# Patient Record
Sex: FEMALE | Race: WHITE | NOT HISPANIC OR LATINO | Employment: FULL TIME | URBAN - METROPOLITAN AREA
[De-identification: names, ages, dates, MRNs, and addresses within clinical notes are randomized per-mention and may not be internally consistent; named-entity substitution may affect disease eponyms.]

---

## 2017-04-24 ENCOUNTER — ALLSCRIPTS OFFICE VISIT (OUTPATIENT)
Dept: OTHER | Facility: OTHER | Age: 32
End: 2017-04-24

## 2017-06-01 ENCOUNTER — OFFICE VISIT (OUTPATIENT)
Dept: URGENT CARE | Facility: CLINIC | Age: 32
End: 2017-06-01
Payer: COMMERCIAL

## 2017-06-01 PROCEDURE — 99214 OFFICE O/P EST MOD 30 MIN: CPT

## 2017-08-29 ENCOUNTER — TRANSCRIBE ORDERS (OUTPATIENT)
Dept: ADMINISTRATIVE | Facility: HOSPITAL | Age: 32
End: 2017-08-29

## 2017-08-29 DIAGNOSIS — M79.604 RIGHT LEG PAIN: Primary | ICD-10-CM

## 2017-08-29 DIAGNOSIS — O22.02: ICD-10-CM

## 2017-08-30 ENCOUNTER — HOSPITAL ENCOUNTER (OUTPATIENT)
Dept: NON INVASIVE DIAGNOSTICS | Facility: HOSPITAL | Age: 32
Discharge: HOME | End: 2017-08-30
Attending: OBSTETRICS & GYNECOLOGY
Payer: COMMERCIAL

## 2017-08-30 DIAGNOSIS — M79.604 RIGHT LEG PAIN: ICD-10-CM

## 2017-08-30 DIAGNOSIS — O22.02: ICD-10-CM

## 2017-08-30 PROCEDURE — 93971 EXTREMITY STUDY: CPT

## 2017-12-05 ENCOUNTER — LAB REQUISITION (OUTPATIENT)
Dept: LAB | Facility: HOSPITAL | Age: 32
End: 2017-12-05
Payer: COMMERCIAL

## 2017-12-05 DIAGNOSIS — O09.93 SUPERVISION OF HIGH RISK PREGNANCY IN THIRD TRIMESTER: ICD-10-CM

## 2017-12-05 PROCEDURE — 87653 STREP B DNA AMP PROBE: CPT | Performed by: OBSTETRICS & GYNECOLOGY

## 2017-12-07 LAB — GP B STREP DNA SPEC QL NAA+PROBE: NORMAL

## 2017-12-31 ENCOUNTER — HOSPITAL ENCOUNTER (INPATIENT)
Facility: HOSPITAL | Age: 32
LOS: 2 days | Discharge: HOME | End: 2018-01-02
Attending: OBSTETRICS & GYNECOLOGY | Admitting: OBSTETRICS & GYNECOLOGY
Payer: COMMERCIAL

## 2017-12-31 DIAGNOSIS — Z3A.40 40 WEEKS GESTATION OF PREGNANCY: Primary | ICD-10-CM

## 2017-12-31 PROBLEM — J01.10 ACUTE FRONTAL SINUSITIS: Status: ACTIVE | Noted: 2017-06-01

## 2017-12-31 LAB
ERYTHROCYTE [DISTWIDTH] IN BLOOD BY AUTOMATED COUNT: 14.3 % (ref 11.6–15.1)
HCT VFR BLD AUTO: 37.7 % (ref 34.8–46.1)
HGB BLD-MCNC: 13.1 G/DL (ref 11.5–15.4)
MCH RBC QN AUTO: 29.7 PG (ref 26.8–34.3)
MCHC RBC AUTO-ENTMCNC: 34.7 G/DL (ref 31.4–37.4)
MCV RBC AUTO: 86 FL (ref 82–98)
PLATELET # BLD AUTO: 186 THOUSANDS/UL (ref 149–390)
PMV BLD AUTO: 10.7 FL (ref 8.9–12.7)
RBC # BLD AUTO: 4.41 MILLION/UL (ref 3.81–5.12)
WBC # BLD AUTO: 10.09 THOUSAND/UL (ref 4.31–10.16)

## 2017-12-31 PROCEDURE — 86850 RBC ANTIBODY SCREEN: CPT | Performed by: OBSTETRICS & GYNECOLOGY

## 2017-12-31 PROCEDURE — 86900 BLOOD TYPING SEROLOGIC ABO: CPT | Performed by: OBSTETRICS & GYNECOLOGY

## 2017-12-31 PROCEDURE — 86901 BLOOD TYPING SEROLOGIC RH(D): CPT | Performed by: OBSTETRICS & GYNECOLOGY

## 2017-12-31 PROCEDURE — 85027 COMPLETE CBC AUTOMATED: CPT | Performed by: OBSTETRICS & GYNECOLOGY

## 2017-12-31 PROCEDURE — 86592 SYPHILIS TEST NON-TREP QUAL: CPT | Performed by: OBSTETRICS & GYNECOLOGY

## 2017-12-31 RX ORDER — ONDANSETRON 2 MG/ML
4 INJECTION INTRAMUSCULAR; INTRAVENOUS EVERY 6 HOURS PRN
Status: DISCONTINUED | OUTPATIENT
Start: 2017-12-31 | End: 2018-01-01

## 2017-12-31 RX ORDER — LEVOTHYROXINE SODIUM 0.2 MG/1
200 TABLET ORAL
Status: DISCONTINUED | OUTPATIENT
Start: 2018-01-01 | End: 2018-01-02 | Stop reason: HOSPADM

## 2017-12-31 RX ORDER — SODIUM CHLORIDE, SODIUM LACTATE, POTASSIUM CHLORIDE, CALCIUM CHLORIDE 600; 310; 30; 20 MG/100ML; MG/100ML; MG/100ML; MG/100ML
125 INJECTION, SOLUTION INTRAVENOUS CONTINUOUS
Status: DISCONTINUED | OUTPATIENT
Start: 2018-01-01 | End: 2018-01-01

## 2017-12-31 RX ADMIN — SODIUM CHLORIDE, SODIUM LACTATE, POTASSIUM CHLORIDE, AND CALCIUM CHLORIDE 999 ML/HR: .6; .31; .03; .02 INJECTION, SOLUTION INTRAVENOUS at 23:58

## 2018-01-01 ENCOUNTER — ANESTHESIA EVENT (INPATIENT)
Dept: LABOR AND DELIVERY | Facility: HOSPITAL | Age: 33
End: 2018-01-01
Payer: COMMERCIAL

## 2018-01-01 ENCOUNTER — ANESTHESIA (INPATIENT)
Dept: LABOR AND DELIVERY | Facility: HOSPITAL | Age: 33
End: 2018-01-01
Payer: COMMERCIAL

## 2018-01-01 LAB
ABO GROUP BLD: NORMAL
BASE EXCESS BLDCOV CALC-SCNC: -3.1 MMOL/L (ref 1–9)
BLD GP AB SCN SERPL QL: NEGATIVE
HCO3 BLDCOV-SCNC: 19.8 MMOL/L (ref 12.2–28.6)
OXYHGB MFR BLDCOV: 70.5 %
PCO2 BLDCOV: 29.9 MM HG (ref 27–43)
PH BLDCOV: 7.44 [PH] (ref 7.19–7.49)
PO2 BLDCOV: 26.6 MM HG (ref 15–45)
RH BLD: POSITIVE
RPR SER QL: NORMAL
SAO2 % BLDCOV: 15.3 ML/DL
SPECIMEN EXPIRATION DATE: NORMAL

## 2018-01-01 PROCEDURE — 10907ZC DRAINAGE OF AMNIOTIC FLUID, THERAPEUTIC FROM PRODUCTS OF CONCEPTION, VIA NATURAL OR ARTIFICIAL OPENING: ICD-10-PCS | Performed by: OBSTETRICS & GYNECOLOGY

## 2018-01-01 PROCEDURE — 0KQM0ZZ REPAIR PERINEUM MUSCLE, OPEN APPROACH: ICD-10-PCS | Performed by: OBSTETRICS & GYNECOLOGY

## 2018-01-01 PROCEDURE — 99203 OFFICE O/P NEW LOW 30 MIN: CPT

## 2018-01-01 PROCEDURE — G0463 HOSPITAL OUTPT CLINIC VISIT: HCPCS

## 2018-01-01 PROCEDURE — 82805 BLOOD GASES W/O2 SATURATION: CPT | Performed by: OBSTETRICS & GYNECOLOGY

## 2018-01-01 RX ORDER — OXYTOCIN/RINGER'S LACTATE 30/500 ML
1-30 PLASTIC BAG, INJECTION (ML) INTRAVENOUS
Status: DISCONTINUED | OUTPATIENT
Start: 2018-01-01 | End: 2018-01-02 | Stop reason: HOSPADM

## 2018-01-01 RX ORDER — DOCUSATE SODIUM 100 MG/1
100 CAPSULE, LIQUID FILLED ORAL 2 TIMES DAILY
Status: DISCONTINUED | OUTPATIENT
Start: 2018-01-01 | End: 2018-01-02 | Stop reason: HOSPADM

## 2018-01-01 RX ORDER — CALCIUM CARBONATE 200(500)MG
1000 TABLET,CHEWABLE ORAL DAILY PRN
Status: DISCONTINUED | OUTPATIENT
Start: 2018-01-01 | End: 2018-01-02 | Stop reason: HOSPADM

## 2018-01-01 RX ORDER — ACETAMINOPHEN 325 MG/1
650 TABLET ORAL EVERY 6 HOURS PRN
Status: DISCONTINUED | OUTPATIENT
Start: 2018-01-01 | End: 2018-01-02 | Stop reason: HOSPADM

## 2018-01-01 RX ORDER — DIAPER,BRIEF,INFANT-TODD,DISP
1 EACH MISCELLANEOUS AS NEEDED
Status: DISCONTINUED | OUTPATIENT
Start: 2018-01-01 | End: 2018-01-02 | Stop reason: HOSPADM

## 2018-01-01 RX ORDER — OXYCODONE HYDROCHLORIDE AND ACETAMINOPHEN 5; 325 MG/1; MG/1
1 TABLET ORAL EVERY 4 HOURS PRN
Status: DISCONTINUED | OUTPATIENT
Start: 2018-01-01 | End: 2018-01-02 | Stop reason: HOSPADM

## 2018-01-01 RX ORDER — IBUPROFEN 600 MG/1
600 TABLET ORAL EVERY 6 HOURS PRN
Status: DISCONTINUED | OUTPATIENT
Start: 2018-01-01 | End: 2018-01-02 | Stop reason: HOSPADM

## 2018-01-01 RX ORDER — DIPHENHYDRAMINE HCL 25 MG
25 TABLET ORAL EVERY 6 HOURS PRN
Status: DISCONTINUED | OUTPATIENT
Start: 2018-01-01 | End: 2018-01-02 | Stop reason: HOSPADM

## 2018-01-01 RX ORDER — OXYCODONE HYDROCHLORIDE AND ACETAMINOPHEN 5; 325 MG/1; MG/1
2 TABLET ORAL EVERY 4 HOURS PRN
Status: DISCONTINUED | OUTPATIENT
Start: 2018-01-01 | End: 2018-01-02 | Stop reason: HOSPADM

## 2018-01-01 RX ORDER — DIPHENHYDRAMINE HYDROCHLORIDE 50 MG/ML
25 INJECTION INTRAMUSCULAR; INTRAVENOUS EVERY 6 HOURS PRN
Status: DISCONTINUED | OUTPATIENT
Start: 2018-01-01 | End: 2018-01-01

## 2018-01-01 RX ORDER — ONDANSETRON 2 MG/ML
4 INJECTION INTRAMUSCULAR; INTRAVENOUS EVERY 4 HOURS PRN
Status: DISCONTINUED | OUTPATIENT
Start: 2018-01-01 | End: 2018-01-01

## 2018-01-01 RX ORDER — ONDANSETRON 2 MG/ML
4 INJECTION INTRAMUSCULAR; INTRAVENOUS EVERY 8 HOURS PRN
Status: DISCONTINUED | OUTPATIENT
Start: 2018-01-01 | End: 2018-01-02 | Stop reason: HOSPADM

## 2018-01-01 RX ORDER — OXYTOCIN/RINGER'S LACTATE 30/500 ML
PLASTIC BAG, INJECTION (ML) INTRAVENOUS
Status: DISCONTINUED
Start: 2018-01-01 | End: 2018-01-01 | Stop reason: WASHOUT

## 2018-01-01 RX ADMIN — DOCUSATE SODIUM 100 MG: 100 CAPSULE, LIQUID FILLED ORAL at 20:25

## 2018-01-01 RX ADMIN — ROPIVACAINE HYDROCHLORIDE: 2 INJECTION, SOLUTION EPIDURAL; INFILTRATION at 00:36

## 2018-01-01 RX ADMIN — WITCH HAZEL 1 PAD: 500 SOLUTION RECTAL; TOPICAL at 03:26

## 2018-01-01 RX ADMIN — BENZOCAINE AND MENTHOL: 20; .5 SPRAY TOPICAL at 03:26

## 2018-01-01 RX ADMIN — IBUPROFEN 600 MG: 600 TABLET, FILM COATED ORAL at 15:57

## 2018-01-01 RX ADMIN — IBUPROFEN 600 MG: 600 TABLET, FILM COATED ORAL at 08:42

## 2018-01-01 RX ADMIN — LEVOTHYROXINE SODIUM 200 MCG: 200 TABLET ORAL at 05:44

## 2018-01-01 RX ADMIN — SODIUM CHLORIDE, SODIUM LACTATE, POTASSIUM CHLORIDE, AND CALCIUM CHLORIDE 125 ML/HR: .6; .31; .03; .02 INJECTION, SOLUTION INTRAVENOUS at 01:01

## 2018-01-01 RX ADMIN — DOCUSATE SODIUM 100 MG: 100 CAPSULE, LIQUID FILLED ORAL at 08:41

## 2018-01-01 NOTE — PLAN OF CARE
Knowledge Deficit     Verbalizes understanding of labor plan Completed        Labor & Delivery     Manages discomfort Completed     Patient vital signs are stable Completed          POSTPARTUM     Experiences normal postpartum course Progressing     Appropriate maternal -  bonding Progressing     Establishment of infant feeding pattern Progressing     Incision(s), wounds(s) or drain site(s) healing without S/S of infection Progressing

## 2018-01-01 NOTE — ANESTHESIA PROCEDURE NOTES
Epidural Block    Patient location during procedure: OB  Start time: 1/1/2018 12:23 AM  Reason for block: procedure for pain, at surgeon's request, post-op pain management and primary anesthetic  Staffing  Anesthesiologist: Roni Kennedy  Performed: anesthesiologist   Preanesthetic Checklist  Completed: patient identified, site marked, surgical consent, pre-op evaluation, timeout performed, IV checked, risks and benefits discussed and monitors and equipment checked  Epidural  Patient position: sitting  Prep: ChloraPrep  Patient monitoring: cardiac monitor and frequent blood pressure checks  Approach: midline  Location: lumbar (1-5)  Injection technique: [place] air  Needle  Needle type: Tuohy   Needle gauge: 18 G  Catheter type: side hole  Catheter size: 20 G  Test dose: negative and lidocaine 1 5% with epinephrine 1-to-200,000  Assessment  Sensory level: [id number] aspiration for CSF, negative aspiration for heme and no paresthesia on injection  patient tolerated the procedure well with no immediate complications

## 2018-01-01 NOTE — H&P
History & Physical - OB/GYN   Maria Ines Painter 27 y o  female MRN: [id number]  Unit/Bed#: L&D 323-01 Encounter: [id number]    27 y o   at 40w1d by first trimester ultrasound  She is a patient of [facility]    Chief complaint/HPI:  Contractions, occurring every 3 minutes since   Reports vaginal discharge and minimal vaginal bleeding  Endorses adequate fetal movement  Pregnancy Complications:  Patient Active Problem List   Diagnosis    40 weeks gestation of pregnancy    Acute frontal sinusitis    Hypothyroidism       PMH:  Past Medical History:   Diagnosis Date    Abnormal Pap smear of cervix     Anxiety     Disease of thyroid gland     Female infertility     IUI pregnancy    Varicella     Varicose vein of leg        PSH:  Past Surgical History:   Procedure Laterality Date    BUNIONECTOMY Left     COLPOSCOPY      WISDOM TOOTH EXTRACTION         Social Hx:  Denies x 3 in pregnancy    OB Hx:  Obstetric History       T1      L1     SAB0   TAB0   Ectopic0   Multiple0   Live Births1       # Outcome Date GA Lbr Lucio/2nd Weight Sex Delivery Anes PTL Lv   2 Current            1 Term 12/07/15 40w6d  3714 g (8 lb 3 oz) M Vag-Spont EPI N MARY          Meds:  No current facility-administered medications on file prior to encounter  No current outpatient prescriptions on file prior to encounter  Allergies:  No Known Allergies    Labs:  Blood type: AB+  Antibody: negative  Group B strep: negative  HIV: negative  Hepatitis B: negative  RPR: NR  Rubella: Immune  Varicella Immune  1 hour Glucose: 119    Physical Exam:  Ht 5' 8" (1 727 m)   Wt 97 5 kg (215 lb)   LMP 2017 (Approximate)   Breastfeeding? Yes   BMI 32 69 kg/m²     Physical Exam   Constitutional: She is oriented to person, place, and time  She appears well-developed and well-nourished  Cardiovascular: Normal rate, regular rhythm and normal heart sounds      Pulmonary/Chest: Effort normal and breath sounds normal    Abdominal: Soft  Bowel sounds are normal  She exhibits distension  Gravid   Neurological: She is alert and oriented to person, place, and time  Estimated Fetal Weight: 7 lbs  Presentation: vertex    SVE: 5 / 80% / -1  FHT:  120 / Moderate 6 - 25 bpm / + accelerations, no decelerations  Rosser: q2-3 minutes    Membranes: intact    Assessment:   27 y o   at 40w1d in labor    Plan:   1  Admit to L&D  2  CBC, RPR, type and screen  3   FEN: clear liquid diet, LR IVF    Epidural upon request    Discussed with Dr Errol John DO

## 2018-01-01 NOTE — ANESTHESIA POSTPROCEDURE EVALUATION
Post-Op Assessment Note      CV Status:  Stable    Mental Status:  Alert and awake    Hydration Status:  Euvolemic    PONV Controlled:  Controlled    Airway Patency:  Patent    Post Op Vitals Reviewed: Yes          Staff: Anesthesiologist     Post-op block assessment: catheter intact, site cleaned and no complications        BP      Temp      Pulse     Resp      SpO2

## 2018-01-01 NOTE — DISCHARGE INSTRUCTIONS
Vaginal Delivery   WHAT YOU SHOULD KNOW:   A vaginal delivery is the birth of your baby through your vagina (birth canal)  AFTER YOU LEAVE:   Medicines:  · NSAIDs  help decrease swelling and pain or fever  This medicine is available with or without a doctor's order  NSAIDs can cause stomach bleeding or kidney problems in certain people  If you take blood thinner medicine, always ask your healthcare provider if NSAIDs are safe for you  Always read the medicine label and follow directions  · Take your medicine as directed  Call your healthcare provider if you think your medicine is not helping or if you have side effects  Tell him if you are allergic to any medicine  Keep a list of the medicines, vitamins, and herbs you take  Include the amounts, and when and why you take them  Bring the list or the pill bottles to follow-up visits  Carry your medicine list with you in case of an emergency  Follow up with your primary healthcare provider:  Most women need to return 6 weeks after a vaginal delivery  Ask about how to care for your wounds or stitches  Write down your questions so you remember to ask them during your visits  Activity:  Rest as much as possible  Try to keep all activities short  You may be able to do some exercise soon after you have your baby  Talk with your primary healthcare provider before you start exercising  If you work outside the home, ask when you can return to your job  Kegel exercises:  Kegel exercises may help your vaginal and rectal muscles heal faster  You can do Kegel exercises by tightening and relaxing the muscles around your vagina  Kegel exercises help make the muscles stronger  Breast care:  When your milk comes in, your breasts may feel full and hard  Ask how to care for your breasts, even if you are not breastfeeding  Constipation:  Do not try to push the bowel movement out if it is too hard   High-fiber foods, extra liquids, and regular exercise can help you prevent constipation  Examples of high-fiber foods are fruit and bran  Prune juice and water are good liquids to drink  Regular exercise helps your digestive system work  You may also be told to take over-the-counter fiber and stool softener medicines  Take these items as directed  Hemorrhoids:  Pregnancy can cause severe hemorrhoids  You may have rectal pain because of the hemorrhoids  Ask how to prevent or treat hemorrhoids  Perineum care: Your perineum is the area between your vagina and anus  Keep the area clean and dry to help it heal and to prevent infection  Wash the area gently with soap and water when you bathe or shower  Rinse your perineum with warm water when you use the toilet  Your primary healthcare provider may suggest you use a warm sitz bath to help decrease pain  A sitz bath is a bathtub or basin filled to hip level  Stay in the sitz bath for 20 to 30 minutes, or as directed  Vaginal discharge: You will have vaginal discharge, called lochia, after your delivery  The lochia is bright red the first day or two after the birth  By the fourth day, the amount decreases, and it turns red-brown  Use a sanitary pad rather than a tampon to prevent a vaginal infection  It is normal to have lochia up to 8 weeks after your baby is born  Monthly periods: Your period may start again within 7 to 12 weeks after your baby is born  If you are breastfeeding, it may take longer for your period to start again  You can still get pregnant again even though you do not have your monthly period  Talk with your primary healthcare provider about a birth control method that will be good for you if you do not want to get pregnant  Mood changes: Many new mothers have some kind of mood changes after delivery  Some of these changes occur because of lack of sleep, hormone changes, and caring for a new baby  Some mood changes can be more serious, such as postpartum depression   Talk with your primary healthcare provider if you feel unable to care for yourself or your baby  Sexual activity:  You may need to avoid sex for 6 to 7 weeks after you have your baby  You may notice you have a decreased desire for sex, or sex may be painful  You may need to use a vaginal lubricant (gel) to help make sex more comfortable  Contact your primary healthcare provider if:   · You have heavy vaginal bleeding that fills 1 or more sanitary pads in 1 hour  · You have a fever  · Your pain does not go away, or gets worse  · The skin between your vagina and rectum is swollen, warm, or red  · You have swollen, hard, or painful breasts  · You feel very sad or depressed  · You feel more tired than usual      · You have questions or concerns about your condition or care  Seek care immediately or call 911 if:   · You have pus or yellow drainage coming from your vagina or wound  · You are urinating very little, or not at all  · Your arm or leg feels warm, tender, and painful  It may look swollen and red  · You feel lightheaded, have sudden and worsening chest pain, or trouble breathing  You may have more pain when you take deep breaths or cough, or you may cough up blood  © 2014 [address] is for End User's use only and may not be sold, redistributed or otherwise used for commercial purposes  All illustrations and images included in CareNotes® are the copyrighted property of [employer]  or [employer]  The above information is an  only  It is not intended as medical advice for individual conditions or treatments  Talk to your doctor, nurse or pharmacist before following any medical regimen to see if it is safe and effective for you

## 2018-01-01 NOTE — L&D DELIVERY NOTE
Obstetrician:  Pb Serrato DO  Assistant: Collette Ortiz DO  Pre-Delivery Diagnosis: Term pregnancy; Spontaneous labor; Single fetus  Post-Delivery Diagnosis: Same as above - Delivered; Viable male fetus; 2nd degree laceration  Procedure: Spontaneous vaginal delivery; Repair 2nd degree spontaneous laceration    Delivery Date and Time: 2018  0126  Estimated Blood Loss: 230 ml   Complications:  None  Labor and Delivery  Sydnie arrived to L&D in active labor  The patient delivered by   The anterior shoulder delivered atraumatically with maternal expulsive forces and the assistance of gentle downward traction  The posterior shoulder delivered with maternal expulsive forces and the assistance of gentle upward traction  The remainder of the fetus delivered without difficulty  Upon delivery, the  was placed on the mother's abdomen  The  resuscitator evaluated the  at bedside  The cord was clamped and cut after pulsing stopped  Arterial and venous cord blood gases and cord blood were collected for analysis along with a segment of the umbilical cord  The placenta delivered spontaneously and was noted to have a 3-vessel cord  The uterus was massaged to firm  EBL: 350 mL  The vagina, cervix, and perineum were inspected and there was noted to be a 2nd degree perineal laceration  Laceration Repair  The laceration was repaired with 3-0 Vicryl  Hemostasis was confirmed at the conclusion of this procedure  At the conclusion of the delivery, all sponge, needle, and instrument counts were noted to be correct  The patient tolerated the procedure well and was allowed to recover in labor and delivery room  I was present for this delivery and repair    Pb Serrato DO  2018  1:54 AM

## 2018-01-01 NOTE — PROGRESS NOTES
Discharge Summary - OB/GYN  Tracy Hoff 27 y o  female MRN: [id number]  Unit/Bed#: L&D 323-01 Encounter: [id number]    Admission Date: 2017     Discharge Date: 2018    Attending: Patrick Conde DO    Principal Diagnosis: Pregnancy at 40w2d    Secondary Diagnosis: None    Procedures: spontaneous vaginal delivery, repair of second degree perineal laceration    Anesthesia: spinal    Complications: none apparent    Labor course:  Patient was admitted for labor on 17 at 1139  She received spinal anesthesia and was artifically ruptured at [address] on 18  She became complete at 0122, started pushing at [address] and delivered a viable male  at 46 with APGARS 9, 9 at one and five minutes  The  weighed 8lb  A 2nd degree perineal was noted and repaired per the delivery note, please see it for further details  Upon discharge patient was ambulating, tolerating PO , passing flatus, has not had a bowel movement, denies chest pain, SOB, nausea, vomiting, fever, or abdominal pain  She states she feels well and does not have concerns or questions at this time  She was instructed to call her provider if any problems arise and informed that she will need to make a postpartum appointment at 6 weeks  Condition at discharge: good     Discharge instructions/Information to patient and family:   See after visit summary for information provided to patient and family  Provisions for Follow-Up Care:  See after visit summary for information related to follow-up care and any pertinent home health orders  Disposition: See After Visit Summary for discharge disposition information  Planned Readmission: No    Discharge Medications: For a complete list of the patient's medications, please refer to her med rec      Marcus Chakraborty DO  PGY-1 OB/GYN   2018 6:39 AM

## 2018-01-01 NOTE — PROGRESS NOTES
Patient sitting up for epidural at 0018 to [place]   Fetal heart rate not picking up at this time

## 2018-01-01 NOTE — ANESTHESIA PREPROCEDURE EVALUATION
Review of Systems/Medical History  Patient summary reviewed        Cardiovascular  Negative cardio ROS    Pulmonary  Negative pulmonary ROS ,        GI/Hepatic  Negative GI/hepatic ROS          Negative  ROS        Endo/Other  Negative endo/other ROS History of thyroid disease , hypothyroidism,      GYN  Negative gynecology ROS          Hematology  Negative hematology ROS      Musculoskeletal  Negative musculoskeletal ROS        Neurology  Negative neurology ROS      Psychology   Negative psychology ROS            Physical Exam    Airway    Mallampati score: II  TM Distance: >3 FB  Neck ROM: full     Dental   No notable dental hx     Cardiovascular  Comment: Negative ROS, Rhythm: regular, Rate: normal, Cardiovascular exam normal    Pulmonary  Pulmonary exam normal Breath sounds clear to auscultation,     Other Findings        Anesthesia Plan  ASA Score- 2     Anesthesia Type- epidural with ASA Monitors  Additional Monitors:   Airway Plan:         Plan Factors-    Induction-     Postoperative Plan-     Informed Consent- Anesthetic plan and risks discussed with patient

## 2018-01-01 NOTE — OB LABOR/OXYTOCIN SAFETY PROGRESS
Labor Progress Note - Perlita Machado 27 y o  female MRN: [id number]    Unit/Bed#: L&D 323-01 Encounter: [id number]    Obstetric History       T1      L1     SAB0   TAB0   Ectopic0   Multiple0   Live Births1      Gestational Age: 41w4d               Dilation: Lip/rim (Comment)        Effacement (%): 100  Station: 0                   Notes/comments:   Pt feeling pressure with epidural    AROM for thin meconium-stained fluid  Anticipate       Wyatt Mane DO 2018 1:03 AM

## 2018-01-02 VITALS
WEIGHT: 215 LBS | HEART RATE: 91 BPM | RESPIRATION RATE: 18 BRPM | SYSTOLIC BLOOD PRESSURE: 104 MMHG | BODY MASS INDEX: 32.58 KG/M2 | DIASTOLIC BLOOD PRESSURE: 58 MMHG | OXYGEN SATURATION: 98 % | HEIGHT: 68 IN | TEMPERATURE: 97.5 F

## 2018-01-02 RX ORDER — IBUPROFEN 600 MG/1
600 TABLET ORAL EVERY 6 HOURS PRN
Qty: 30 TABLET | Refills: 0 | Status: SHIPPED | OUTPATIENT
Start: 2018-01-02 | End: 2019-04-30

## 2018-01-02 RX ORDER — ACETAMINOPHEN 325 MG/1
650 TABLET ORAL EVERY 4 HOURS PRN
Qty: 30 TABLET | Refills: 0 | Status: SHIPPED | OUTPATIENT
Start: 2018-01-02

## 2018-01-02 RX ORDER — LEVOTHYROXINE SODIUM 0.2 MG/1
200 TABLET ORAL
Refills: 0 | Status: SHIPPED | OUTPATIENT
Start: 2018-01-03 | End: 2018-07-23 | Stop reason: SDUPTHER

## 2018-01-02 RX ADMIN — DOCUSATE SODIUM 100 MG: 100 CAPSULE, LIQUID FILLED ORAL at 08:43

## 2018-01-02 RX ADMIN — LEVOTHYROXINE SODIUM 200 MCG: 200 TABLET ORAL at 05:59

## 2018-01-02 RX ADMIN — IBUPROFEN 600 MG: 600 TABLET, FILM COATED ORAL at 01:43

## 2018-01-02 NOTE — PROGRESS NOTES
Progress Note - OB/GYN   Christian Vyas 27 y o  female MRN: [id number]  Unit/Bed#: L&D 308-01 Encounter: [id number]    Christian Vyas is a patient of [facility]    Assessment:  Post partum day #1 s/p , stable, and doing well    Plan:  1  Continue routine post partum care   - Encourage ambulation   - Encourage breastfeeding  2  Continue current meds   - See list below   - Pain well controlled with PO analgesics  3  Hypothyroidism   - Currently on Levothyroxine 200mcg   - Will continue current dose, pt to f/u post-partum if dose adjustment needed  4   Disposition   - Stable, vitals WNL   - Anticipate discharge home today if possible as patient would like early discharge   - Patient delivered at [address] [place] [address] at 18; this is her 2nd baby      Subjective/Objective     Chief Complaint:     Post partum day 1 from a  with no complaints today    Subjective:   Pain: no  Tolerating Oral Intake: yes  Voiding: yes  Flatus: yes  Bowel Movement: no  Ambulating: yes  Breastfeeding: Breastfeeding  Chest Pain: no  Shortness of Breath: no  Leg Pain/Discomfort: no  Lochia: minimal    Objective:   Vitals:    18 2230   BP: 112/67   Pulse: 80   Resp: 18   Temp: 98 2 °F (36 8 °C)   SpO2:        Intake/Output Summary (Last 24 hours) at 18 7227  Last data filed at 18 0836   Gross per 24 hour   Intake                0 ml   Output              600 ml   Net             -600 ml       Physical Exam:  General: in no apparent distress and well developed and well nourished  Cardiovascular: Cor RRR  Lungs: clear to auscultation bilaterally  Abdomen: abdomen is soft without significant tenderness, masses, organomegaly or guarding  Fundus: Firm and non-tender, 2 below the umbilicus  Lower extremeties: nontender    Ace [place], DO  PGY-1 OB/GYN   2018 6:33 AM

## 2018-01-02 NOTE — PLAN OF CARE
POSTPARTUM     Experiences normal postpartum course Completed     Appropriate maternal -  bonding Completed     Establishment of infant feeding pattern Completed     Incision(s), wounds(s) or drain site(s) healing without S/S of infection Completed

## 2018-01-02 NOTE — DISCHARGE SUMMARY
Discharge Summary - Bill Ruiz 27 y o  female MRN: [id number]    Unit/Bed#: L&D 214-43 Encounter: [id number]    Admission Date: 2017     Discharge Date: 18    Attending: Marilu Kern DO   Discharging: Ynes Benitez DO    Principal Diagnosis: Amniotic fluid leaking [O42 90], Pregnancy at 40w2d    Secondary Diagnosis: none    Procedures: spontaneous vaginal delivery    Anesthesia: epidural    Complications: none apparent    Condition at discharge: stable     Discharge instructions/Information to patient and family:   See after visit summary for information provided to patient and family  History of Hospital Course:  Pt presented in active labor and progressed to complete without interventions  She delivered a viable male via  with a second degree perineal laceration  Her postpartum course was unremarkable  She was discharged to home per request on PPD#1  Provisions for Follow-Up Care:  See after visit summary for information related to follow-up care and any pertinent home health orders  Disposition: See After Visit Summary for discharge disposition information  Planned Readmission: No    Discharge Medications: For a complete list of the patient's medications, please refer to her med rec      Sharmila Santizo DO  18  9:50 AM

## 2018-01-04 ENCOUNTER — GENERIC CONVERSION - ENCOUNTER (OUTPATIENT)
Dept: OTHER | Facility: OTHER | Age: 33
End: 2018-01-04

## 2018-01-09 LAB — PLACENTA IN STORAGE: NORMAL

## 2018-01-11 NOTE — PROGRESS NOTES
Assessment    1  Hypothyroidism (244 9) (E03 9)    Plan  Acute conjunctivitis, unspecified acute conjunctivitis type, unspecified laterality,  Conjunctivitis, Health Maintenance, Hypothyroidism    · Levothyroxine Sodium 150 MCG Oral Tablet; TAKE 1 TABLET DAILY   · (1) TSH; Status:Active; Requested for:03Oct2016;     Chief Complaint  FOLLOW UP TO REVIEW LABS      History of Present Illness  follow up for lab work, pt has is hypothyroid, pt complains of fatigue, pt is taking synthroid at 137mcg, TSH-10 05      Review of Systems    Constitutional: no fever and no chills  Cardiovascular: no chest pain and no palpitations  Active Problems    1  Acute conjunctivitis, unspecified acute conjunctivitis type, unspecified laterality (372 00)   (H10 30)   2  Conjunctivitis (372 30) (H10 9)   3  Hypothyroidism (244 9) (E03 9)   4  Otitis media (382 9) (H66 90)   5  Patient is a currently breast-feeding mother (V24 1) (Z39 1)   6  Screening for cardiovascular condition (V81 2) (Z13 6)   7  Screening for diabetes mellitus (V77 1) (Z13 1)    Past Medical History    1  No pertinent past medical history    Surgical History    1  History of Bunion Correction With Metatarsal Osteotomy   2  History of Oral Surgery Tooth Extraction Bernard Tooth    Family History  Mother    1  Family history of malignant neoplasm of female breast (V16 3) (Z80 3)    Social History    · Full-time employment   ·    · Never a smoker   · No drug use   · One child   · Social alcohol use (Z78 9)    Current Meds   1  Levothyroxine Sodium 137 MCG Oral Tablet; TAKE 1 TABLET DAILY  Requested for:   01Aug2016; Last Rx:[id number] Ordered   2  Prenatal TABS; Therapy: (Recorded:[id number]) to Recorded   3  Vitamin D TABS; Therapy: (Recorded:89Vvm4217) to Recorded    Allergies    1   No Known Drug Allergies    Vitals  Vital Signs    Recorded: [id number] 90:71GA   Systolic 586   Diastolic 72   Heart Rate 70   Temperature 97 2 F   Height 5 ft 8 in   Weight 187 lb    BMI Calculated 28 43   BSA Calculated 1 99     Physical Exam    Constitutional   General appearance: No acute distress, well appearing and well nourished  Pulmonary   Respiratory effort: No increased work of breathing or signs of respiratory distress  Auscultation of lungs: Clear to auscultation  Cardiovascular   Auscultation of heart: Normal rate and rhythm, normal S1 and S2, without murmurs  Examination of extremities for edema and/or varicosities: Normal     Abdomen   Abdomen: Non-tender, no masses  Liver and spleen: No hepatomegaly or splenomegaly  Lymphatic   Palpation of lymph nodes in neck: No lymphadenopathy  Skin   Skin and subcutaneous tissue: Normal without rashes or lesions  Psychiatric   Mood and affect: Normal          Results/Data  PHQ-9 Adult Depression Screening [id number] 11:23AM User, s     Test Name Result Flag Reference   PHQ-9 Adult Depression Score 0     Over the last two weeks, how often have you been bothered by any of the following problems? Little interest or pleasure in doing things: Not at all - 0  Feeling down, depressed, or hopeless: Not at all - 0  Trouble falling or staying asleep, or sleeping too much: Not at all - 0  Feeling tired or having little energy: Not at all - 0  Poor appetite or over eating: Not at all - 0  Feeling bad about yourself - or that you are a failure or have let yourself or your family down: Not at all - 0  Trouble concentrating on things, such as reading the newspaper or watching television: Not at all - 0  Moving or speaking so slowly that other people could have noticed   Or the opposite -  being so fidgety or restless that you have been moving around a lot more than usual: Not at all - 0  Thoughts that you would be better off dead, or of hurting yourself in some way: Not at all - 0   PHQ-9 Adult Depression Screening Negative     PHQ-9 Difficulty Level Not difficult at all     PHQ-9 Severity No Depression Signatures   Electronically signed by : Julius Arrieta DO; Aug 22 2016 11:40AM EST                       (Author)

## 2018-01-12 NOTE — PROGRESS NOTES
Assessment    1  Encounter for preventive health examination (V70 0) (Z00 00)   2  Hypothyroidism (244 9) (E03 9)   3  Screening for cardiovascular condition (V81 2) (Z13 6)   4  Screening for diabetes mellitus (V77 1) (Z13 1)    Plan  Hypothyroidism    · From  Synthroid 137 MCG Oral Tablet TAKE 1 TABLET DAILY To  Levothyroxine Sodium 137 MCG Oral Tablet (Synthroid) TAKE 1 TABLET DAILY  Screening for cardiovascular condition    · (1) CBC/PLT/DIFF; Status:Active; Requested for:01Aug2016;    · (1) COMPREHENSIVE METABOLIC PANEL; Status:Active; Requested for:01Aug2016;    · (1) LIPID PANEL, FASTING; Status:Active; Requested for:01Aug2016;    · (1) TSH; Status:Active; Requested for:01Aug2016;     Chief Complaint  NEW PATIENT TO THE OFFICE      History of Present Illness  HM, Adult Female: The patient is being seen for a health maintenance evaluation  General Health: The patient's health since the last visit is described as good  She has regular dental visits  She denies vision problems  She denies hearing loss  Immunizations status: up to date  Lifestyle:  She consumes a diverse and healthy diet  She exercises regularly  She does not use tobacco  She consumes alcohol  She denies drug use  Screening:      Review of Systems    Constitutional: no fever and no chills  Eyes: no eyesight problems  ENT: no hearing loss  Cardiovascular: no chest pain and no palpitations  Respiratory: no shortness of breath and no wheezing  Gastrointestinal: no abdominal pain, no nausea, no vomiting, no constipation, no diarrhea and no blood in stools  Genitourinary: negative for nocturia, but no dysuria and no unexplained vaginal bleeding  Musculoskeletal: no arthralgias and no myalgias  Integumentary: no rashes and no skin lesions  Neurological: negative for seizures, but no fainting  Psychiatric: no anxiety and no depression  Hematologic/Lymphatic: no swollen glands and no swollen glands in the neck        Active Problems    1  Acute conjunctivitis, unspecified acute conjunctivitis type, unspecified laterality (372 00)   (H10 30)   2  Conjunctivitis (372 30) (H10 9)   3  Hypothyroidism (244 9) (E03 9)   4  Otitis media (382 9) (H66 90)   5  Patient is a currently breast-feeding mother (V24 1) (Z39 1)    Past Medical History    · No pertinent past medical history    Surgical History    · History of Bunion Correction With Metatarsal Osteotomy   · History of Oral Surgery Tooth Extraction [place] [facility]    Family History  Mother    · Family history of malignant neoplasm of female breast (V16 3) (Z80 3)    Social History    · Never a smoker    Current Meds   1  Prenatal TABS; Therapy: (Recorded:[id number]) to Recorded   2  Synthroid 137 MCG Oral Tablet; TAKE 1 TABLET DAILY; Therapy: (Recorded:91Qwy1156) to Recorded   3  Vitamin D TABS; Therapy: (Recorded:20Ken5947) to Recorded    Allergies    1  No Known Drug Allergies    Vitals   Recorded: 55UOX0563 45:54QH   Systolic 363   Diastolic 84   Heart Rate 58   Temperature 97 7 F   Height 5 ft 8 in   Weight 187 lb 4 oz   BMI Calculated 28 47   BSA Calculated 1 99     Physical Exam    Constitutional   General appearance: No acute distress, well appearing and well nourished  Eyes   Conjunctiva and lids: No swelling, erythema or discharge  Pupils and irises: Equal, round, reactive to light  Ears, Nose, Mouth, and Throat   External inspection of ears and nose: Normal     Nasal mucosa, septum, and turbinates: Normal without edema or erythema  Lips, teeth, and gums: Normal, good dentition  Oropharynx: Normal with no erythema, edema, exudate or lesions  Neck   Neck: Supple, symmetric, trachea midline, no masses  Pulmonary   Respiratory effort: No increased work of breathing or signs of respiratory distress  Auscultation of lungs: Clear to auscultation      Cardiovascular   Abdominal aorta: Normal     Examination of extremities for edema and/or varicosities: Normal  Abdomen   Abdomen: Non-tender, no masses  Liver and spleen: No hepatomegaly or splenomegaly  Lymphatic   Palpation of lymph nodes in neck: No lymphadenopathy  Skin   Skin and subcutaneous tissue: Normal without rashes or lesions      Psychiatric   Mood and affect: Normal        Signatures   Electronically signed by : Brianda Marshall DO; Aug  1 2016 10:07AM EST                       (Author)

## 2018-01-13 VITALS
WEIGHT: 188.25 LBS | DIASTOLIC BLOOD PRESSURE: 62 MMHG | SYSTOLIC BLOOD PRESSURE: 116 MMHG | BODY MASS INDEX: 28.53 KG/M2 | TEMPERATURE: 98.7 F | HEIGHT: 68 IN

## 2018-02-26 NOTE — MISCELLANEOUS
Chief Complaint  Chief Complaint Free Text Note Form: JOSSIE delivery discharge 01/02/2018      History of Present Illness  TCM Communication [facility]: JG PANDEY [facility] records were reviewed  She was hospitalized at [place]  The dates of hospitalization: 12/31/2017, date of admission: 01/02/218  Diagnosis: delivery  She was discharged to home  Medications reviewed and updated today  She did not schedule a follow up appointment  She refused a follow up appointment due to not needed  The patient is currently asymptomatic  Counseling was provided to the patient  Communication performed and completed by [facility]       HPI: pt is instructed to follow up with obgyn and to call us if needed  Active Problems    1  Acute frontal sinusitis (461 1) (J01 10)   2  First trimester pregnancy (V22 2) (Z34 90)   3  Hypothyroidism (244 9) (E03 9)   4  Need for influenza vaccination (V04 81) (Z23)   5  Screening for cardiovascular condition (V81 2) (Z13 6)   6  Screening for cervical cancer (V76 2) (Z12 4)   7  Screening for diabetes mellitus (V77 1) (Z13 1)    Past Medical History    1  History of Acute conjunctivitis, unspecified acute conjunctivitis type, unspecified laterality   (372 00) (H10 30)   2  History of conjunctivitis (V12 49) (Z86 69)   3  History of otitis media (V12 49) (Z86 69)   4  No pertinent past medical history    Surgical History    1  History of Bunion Correction With Metatarsal Osteotomy   2  History of Oral Surgery Tooth Extraction [place] [facility]    Family History  Mother    1  Family history of malignant neoplasm of female breast (V16 3) (Z80 3)    Social History    · Full-time employment   ·    · Never a smoker   · No drug use   · One child   · Social alcohol use (Z78 9)    Current Meds   1  Azithromycin 250 MG Oral Tablet; TAKE 2 TABLETS ON DAY 1 THEN TAKE 1 TABLET A   DAY FOR 4 DAYS; Therapy: [id number] to (Last Rx:01Jun2017)  Requested for: 01Jun2017 Ordered   2   Levothyroxine Sodium 175 MCG Oral Tablet; Therapy: (Recorded:97Goe0039) to Recorded   3  Prenatal TABS; Therapy: (Recorded:17Saa1519) to Recorded   4  Prometrium 200 MG Oral Capsule (Progesterone Micronized); Therapy: (Reuben Vargas) to Recorded   5  Vitamin D TABS; Therapy: (Recorded:10Jjk3583) to Recorded    Allergies    1  No Known Drug Allergies    Message   Recorded as Task   Date: 01/02/2018 12:39 PM, Created By: System   Task Name: [facility] JOSSIE   Assigned To:  Noel Simmons   Regarding Patient: Miki Torres, Status: Active   Comment:    System - 02 Jan 2018 12:39 PM     Patient discharged from hospital   Patient Name: Jessica Paniagua  Patient YOB: 1985  Discharge Date: 1/2/2018  [facility]   Electronically signed by : Bettina Torres DO; Jan 12 2018 10:20AM EST

## 2018-04-24 DIAGNOSIS — E03.9 HYPOTHYROIDISM: ICD-10-CM

## 2018-07-09 DIAGNOSIS — Z13.29 THYROID DISORDER SCREENING: Primary | ICD-10-CM

## 2018-07-11 ENCOUNTER — APPOINTMENT (OUTPATIENT)
Dept: LAB | Facility: CLINIC | Age: 33
End: 2018-07-11
Payer: COMMERCIAL

## 2018-07-11 DIAGNOSIS — Z13.29 THYROID DISORDER SCREENING: ICD-10-CM

## 2018-07-11 LAB
T4 FREE SERPL-MCNC: 0.81 NG/DL (ref 0.76–1.46)
TSH SERPL DL<=0.05 MIU/L-ACNC: 3.95 UIU/ML (ref 0.36–3.74)

## 2018-07-11 PROCEDURE — 36415 COLL VENOUS BLD VENIPUNCTURE: CPT

## 2018-07-11 PROCEDURE — 84439 ASSAY OF FREE THYROXINE: CPT

## 2018-07-11 PROCEDURE — 84443 ASSAY THYROID STIM HORMONE: CPT

## 2018-07-16 ENCOUNTER — OFFICE VISIT (OUTPATIENT)
Dept: FAMILY MEDICINE CLINIC | Facility: CLINIC | Age: 33
End: 2018-07-16
Payer: COMMERCIAL

## 2018-07-16 VITALS
HEIGHT: 68 IN | TEMPERATURE: 98.1 F | WEIGHT: 180 LBS | BODY MASS INDEX: 27.28 KG/M2 | SYSTOLIC BLOOD PRESSURE: 118 MMHG | DIASTOLIC BLOOD PRESSURE: 78 MMHG

## 2018-07-16 DIAGNOSIS — E03.9 ACQUIRED HYPOTHYROIDISM: Primary | ICD-10-CM

## 2018-07-16 PROCEDURE — 99213 OFFICE O/P EST LOW 20 MIN: CPT | Performed by: FAMILY MEDICINE

## 2018-07-16 PROCEDURE — 3008F BODY MASS INDEX DOCD: CPT | Performed by: FAMILY MEDICINE

## 2018-07-16 NOTE — PROGRESS NOTES
Assessment/Plan:    No problem-specific Assessment & Plan notes found for this encounter  Diagnoses and all orders for this visit:    Acquired hypothyroidism  -     CBC and differential; Future  -     Comprehensive metabolic panel; Future  -     Lipid panel; Future  -     TSH, 3rd generation with Free T4 reflex; Future          Subjective:      Patient ID: Isabella Santiago is a 26 y o  female  Pt is on levothyroxine at 200mcg      Thyroid Problem   Presents for follow-up visit  Patient reports no cold intolerance, heat intolerance or palpitations  The following portions of the patient's history were reviewed and updated as appropriate: allergies, current medications, past family history, past medical history, past social history, past surgical history and problem list     Review of Systems   Constitutional: Negative for chills and fever  Respiratory: Negative for shortness of breath and wheezing  Cardiovascular: Negative for chest pain and palpitations  Gastrointestinal: Negative for abdominal pain, nausea and vomiting  Endocrine: Negative for cold intolerance and heat intolerance  Objective:      /78   Temp 98 1 °F (36 7 °C)   Ht 5' 7 5" (1 715 m)   Wt 81 6 kg (180 lb)   LMP 06/26/2018 (Approximate)   Breastfeeding? No   BMI 27 78 kg/m²          Physical Exam   Constitutional: She is oriented to person, place, and time  She appears well-developed and well-nourished  No distress  HENT:   Head: Normocephalic and atraumatic  Eyes: Conjunctivae and EOM are normal  Pupils are equal, round, and reactive to light  No scleral icterus  Neck: Normal range of motion  Neck supple  Cardiovascular: Normal rate, regular rhythm and normal heart sounds  No murmur heard  Pulmonary/Chest: Effort normal and breath sounds normal  No respiratory distress  She has no wheezes  She has no rales  Abdominal: Soft  Bowel sounds are normal  She exhibits no distension and no mass  There is no tenderness  There is no rebound and no guarding  Musculoskeletal: She exhibits no edema  Lymphadenopathy:     She has no cervical adenopathy  Neurological: She is alert and oriented to person, place, and time  She exhibits normal muscle tone  Skin: Skin is warm and dry  No rash noted  She is not diaphoretic  No erythema  No pallor  Psychiatric: She has a normal mood and affect  Her behavior is normal  Judgment and thought content normal    Nursing note and vitals reviewed

## 2018-07-23 DIAGNOSIS — E03.9 HYPOTHYROIDISM, UNSPECIFIED TYPE: ICD-10-CM

## 2018-07-23 DIAGNOSIS — E03.9 HYPOTHYROIDISM, UNSPECIFIED TYPE: Primary | ICD-10-CM

## 2018-07-23 RX ORDER — LEVOTHYROXINE SODIUM 0.2 MG/1
200 TABLET ORAL
Qty: 30 TABLET | Refills: 5 | Status: SHIPPED | OUTPATIENT
Start: 2018-07-23 | End: 2018-07-23 | Stop reason: SDUPTHER

## 2018-07-23 RX ORDER — LEVOTHYROXINE SODIUM 0.2 MG/1
200 TABLET ORAL
Qty: 30 TABLET | Refills: 5 | Status: SHIPPED | OUTPATIENT
Start: 2018-07-23 | End: 2019-07-18 | Stop reason: SDUPTHER

## 2018-09-11 ENCOUNTER — TRANSCRIBE ORDERS (OUTPATIENT)
Dept: ADMINISTRATIVE | Facility: HOSPITAL | Age: 33
End: 2018-09-11

## 2018-09-11 DIAGNOSIS — Z12.31 VISIT FOR SCREENING MAMMOGRAM: Primary | ICD-10-CM

## 2018-10-16 ENCOUNTER — APPOINTMENT (OUTPATIENT)
Dept: LAB | Facility: CLINIC | Age: 33
End: 2018-10-16
Payer: COMMERCIAL

## 2018-10-16 DIAGNOSIS — E03.9 ACQUIRED HYPOTHYROIDISM: ICD-10-CM

## 2018-10-16 DIAGNOSIS — E03.9 HYPOTHYROIDISM: ICD-10-CM

## 2018-10-16 LAB
ALBUMIN SERPL BCP-MCNC: 3.7 G/DL (ref 3.5–5)
ALP SERPL-CCNC: 67 U/L (ref 46–116)
ALT SERPL W P-5'-P-CCNC: 26 U/L (ref 12–78)
ANION GAP SERPL CALCULATED.3IONS-SCNC: 5 MMOL/L (ref 4–13)
AST SERPL W P-5'-P-CCNC: 16 U/L (ref 5–45)
BASOPHILS # BLD AUTO: 0.05 THOUSANDS/ΜL (ref 0–0.1)
BASOPHILS NFR BLD AUTO: 1 % (ref 0–1)
BILIRUB SERPL-MCNC: 0.48 MG/DL (ref 0.2–1)
BUN SERPL-MCNC: 11 MG/DL (ref 5–25)
CALCIUM SERPL-MCNC: 8.5 MG/DL (ref 8.3–10.1)
CHLORIDE SERPL-SCNC: 106 MMOL/L (ref 100–108)
CHOLEST SERPL-MCNC: 147 MG/DL (ref 50–200)
CO2 SERPL-SCNC: 27 MMOL/L (ref 21–32)
CREAT SERPL-MCNC: 0.73 MG/DL (ref 0.6–1.3)
EOSINOPHIL # BLD AUTO: 0.24 THOUSAND/ΜL (ref 0–0.61)
EOSINOPHIL NFR BLD AUTO: 4 % (ref 0–6)
ERYTHROCYTE [DISTWIDTH] IN BLOOD BY AUTOMATED COUNT: 12.8 % (ref 11.6–15.1)
GFR SERPL CREATININE-BSD FRML MDRD: 107 ML/MIN/1.73SQ M
GLUCOSE P FAST SERPL-MCNC: 83 MG/DL (ref 65–99)
HCT VFR BLD AUTO: 40.4 % (ref 34.8–46.1)
HDLC SERPL-MCNC: 45 MG/DL (ref 40–60)
HGB BLD-MCNC: 13.5 G/DL (ref 11.5–15.4)
IMM GRANULOCYTES # BLD AUTO: 0.02 THOUSAND/UL (ref 0–0.2)
IMM GRANULOCYTES NFR BLD AUTO: 0 % (ref 0–2)
LDLC SERPL CALC-MCNC: 89 MG/DL (ref 0–100)
LYMPHOCYTES # BLD AUTO: 2.77 THOUSANDS/ΜL (ref 0.6–4.47)
LYMPHOCYTES NFR BLD AUTO: 40 % (ref 14–44)
MCH RBC QN AUTO: 28.6 PG (ref 26.8–34.3)
MCHC RBC AUTO-ENTMCNC: 33.4 G/DL (ref 31.4–37.4)
MCV RBC AUTO: 86 FL (ref 82–98)
MONOCYTES # BLD AUTO: 0.61 THOUSAND/ΜL (ref 0.17–1.22)
MONOCYTES NFR BLD AUTO: 9 % (ref 4–12)
NEUTROPHILS # BLD AUTO: 3.24 THOUSANDS/ΜL (ref 1.85–7.62)
NEUTS SEG NFR BLD AUTO: 46 % (ref 43–75)
NONHDLC SERPL-MCNC: 102 MG/DL
NRBC BLD AUTO-RTO: 0 /100 WBCS
PLATELET # BLD AUTO: 243 THOUSANDS/UL (ref 149–390)
PMV BLD AUTO: 10.6 FL (ref 8.9–12.7)
POTASSIUM SERPL-SCNC: 4.2 MMOL/L (ref 3.5–5.3)
PROT SERPL-MCNC: 7.1 G/DL (ref 6.4–8.2)
RBC # BLD AUTO: 4.72 MILLION/UL (ref 3.81–5.12)
SODIUM SERPL-SCNC: 138 MMOL/L (ref 136–145)
TRIGL SERPL-MCNC: 64 MG/DL
TSH SERPL DL<=0.05 MIU/L-ACNC: 3.19 UIU/ML (ref 0.36–3.74)
WBC # BLD AUTO: 6.93 THOUSAND/UL (ref 4.31–10.16)

## 2018-10-16 PROCEDURE — 84443 ASSAY THYROID STIM HORMONE: CPT

## 2018-10-16 PROCEDURE — 80061 LIPID PANEL: CPT

## 2018-10-16 PROCEDURE — 36415 COLL VENOUS BLD VENIPUNCTURE: CPT

## 2018-10-16 PROCEDURE — 85025 COMPLETE CBC W/AUTO DIFF WBC: CPT

## 2018-10-16 PROCEDURE — 80053 COMPREHEN METABOLIC PANEL: CPT

## 2018-10-18 ENCOUNTER — OFFICE VISIT (OUTPATIENT)
Dept: FAMILY MEDICINE CLINIC | Facility: CLINIC | Age: 33
End: 2018-10-18
Payer: COMMERCIAL

## 2018-10-18 VITALS
SYSTOLIC BLOOD PRESSURE: 120 MMHG | DIASTOLIC BLOOD PRESSURE: 80 MMHG | BODY MASS INDEX: 28.11 KG/M2 | HEIGHT: 68 IN | WEIGHT: 185.5 LBS | TEMPERATURE: 98.3 F

## 2018-10-18 DIAGNOSIS — Z00.00 ANNUAL PHYSICAL EXAM: Primary | ICD-10-CM

## 2018-10-18 DIAGNOSIS — E03.9 ACQUIRED HYPOTHYROIDISM: ICD-10-CM

## 2018-10-18 DIAGNOSIS — Z23 NEED FOR INFLUENZA VACCINATION: ICD-10-CM

## 2018-10-18 PROCEDURE — 90471 IMMUNIZATION ADMIN: CPT

## 2018-10-18 PROCEDURE — 90686 IIV4 VACC NO PRSV 0.5 ML IM: CPT

## 2018-10-18 PROCEDURE — 99395 PREV VISIT EST AGE 18-39: CPT | Performed by: FAMILY MEDICINE

## 2018-10-18 NOTE — PROGRESS NOTES
Assessment/Plan:    No problem-specific Assessment & Plan notes found for this encounter  Diagnoses and all orders for this visit:    Annual physical exam    Need for influenza vaccination  -     SYRINGE/SINGLE-DOSE VIAL: influenza vaccine, [id number], quadrivalent, 0 5 mL, preservative-free, for patients 3+ yr (FLUZONE)    Acquired hypothyroidism  -     TSH, 3rd generation with Free T4 reflex; Future          Subjective:      Patient ID: Maryan Smoker is a 26 y o  female  Pt here for annual physical, pt has hypothyroidism which is well balanced on levothyroxine at 200 mcg        The following portions of the patient's history were reviewed and updated as appropriate: allergies, current medications, past family history, past medical history, past social history, past surgical history and problem list     Review of Systems   Constitutional: Negative for chills, fatigue and fever  HENT: Negative  Eyes: Negative  Respiratory: Negative for shortness of breath and wheezing  Cardiovascular: Negative for chest pain and palpitations  Gastrointestinal: Negative for abdominal pain, blood in stool, constipation, diarrhea, nausea and vomiting  Endocrine: Negative  Negative for cold intolerance and heat intolerance  Genitourinary: Negative for difficulty urinating and dysuria  Musculoskeletal: Negative for arthralgias and myalgias  Skin: Negative  Allergic/Immunologic: Negative  Neurological: Negative for seizures and syncope  Hematological: Negative for adenopathy  Psychiatric/Behavioral: Negative  Objective:      /80 (BP Location: Left arm, Patient Position: Sitting, Cuff Size: Adult)   Temp 98 3 °F (36 8 °C) (Tympanic)   Ht 5' 7 5" (1 715 m)   Wt 84 1 kg (185 lb 8 oz)   BMI 28 62 kg/m²          Physical Exam   Constitutional: She is oriented to person, place, and time  She appears well-developed and well-nourished  No distress     HENT:   Head: Normocephalic and atraumatic  Right Ear: External ear normal    Left Ear: External ear normal    Nose: Nose normal    Mouth/Throat: Oropharynx is clear and moist    Eyes: Pupils are equal, round, and reactive to light  Conjunctivae and EOM are normal  No scleral icterus  Neck: Normal range of motion  Neck supple  Cardiovascular: Normal rate, regular rhythm and normal heart sounds  No murmur heard  Pulmonary/Chest: Effort normal and breath sounds normal  No respiratory distress  She has no wheezes  She has no rales  Abdominal: Soft  Bowel sounds are normal  She exhibits no distension and no mass  There is no tenderness  There is no rebound and no guarding  Musculoskeletal: Normal range of motion  She exhibits no edema  Lymphadenopathy:     She has no cervical adenopathy  Neurological: She is alert and oriented to person, place, and time  She has normal reflexes  She exhibits normal muscle tone  Skin: Skin is warm and dry  No rash noted  She is not diaphoretic  No erythema  No pallor  Psychiatric: She has a normal mood and affect  Her behavior is normal  Judgment and thought content normal    Nursing note and vitals reviewed

## 2019-02-06 ENCOUNTER — HOSPITAL ENCOUNTER (OUTPATIENT)
Dept: MAMMOGRAPHY | Facility: MEDICAL CENTER | Age: 34
Discharge: HOME | End: 2019-02-06
Payer: COMMERCIAL

## 2019-02-06 VITALS — HEIGHT: 68 IN | BODY MASS INDEX: 28.04 KG/M2 | WEIGHT: 185 LBS

## 2019-02-06 DIAGNOSIS — Z12.31 VISIT FOR SCREENING MAMMOGRAM: ICD-10-CM

## 2019-02-06 PROCEDURE — 77067 SCR MAMMO BI INCL CAD: CPT

## 2019-02-06 PROCEDURE — 77063 BREAST TOMOSYNTHESIS BI: CPT

## 2019-04-24 ENCOUNTER — APPOINTMENT (OUTPATIENT)
Dept: LAB | Facility: CLINIC | Age: 34
End: 2019-04-24
Payer: COMMERCIAL

## 2019-04-24 DIAGNOSIS — E03.9 ACQUIRED HYPOTHYROIDISM: ICD-10-CM

## 2019-04-24 LAB — TSH SERPL DL<=0.05 MIU/L-ACNC: 2.25 UIU/ML (ref 0.36–3.74)

## 2019-04-24 PROCEDURE — 36415 COLL VENOUS BLD VENIPUNCTURE: CPT

## 2019-04-24 PROCEDURE — 84443 ASSAY THYROID STIM HORMONE: CPT

## 2019-04-30 ENCOUNTER — OFFICE VISIT (OUTPATIENT)
Dept: FAMILY MEDICINE CLINIC | Facility: CLINIC | Age: 34
End: 2019-04-30
Payer: COMMERCIAL

## 2019-04-30 VITALS
SYSTOLIC BLOOD PRESSURE: 124 MMHG | TEMPERATURE: 99.4 F | BODY MASS INDEX: 28.7 KG/M2 | DIASTOLIC BLOOD PRESSURE: 80 MMHG | HEIGHT: 68 IN | WEIGHT: 189.4 LBS

## 2019-04-30 DIAGNOSIS — M25.561 ACUTE PAIN OF RIGHT KNEE: ICD-10-CM

## 2019-04-30 DIAGNOSIS — E66.3 OVERWEIGHT (BMI 25.0-29.9): ICD-10-CM

## 2019-04-30 DIAGNOSIS — Z13.6 SCREENING FOR CARDIOVASCULAR CONDITION: ICD-10-CM

## 2019-04-30 DIAGNOSIS — E03.9 ACQUIRED HYPOTHYROIDISM: Primary | ICD-10-CM

## 2019-04-30 PROCEDURE — 99214 OFFICE O/P EST MOD 30 MIN: CPT | Performed by: FAMILY MEDICINE

## 2019-04-30 RX ORDER — IBUPROFEN 800 MG/1
800 TABLET ORAL EVERY 8 HOURS PRN
Qty: 30 TABLET | Refills: 2 | Status: SHIPPED | OUTPATIENT
Start: 2019-04-30 | End: 2019-06-25 | Stop reason: ALTCHOICE

## 2019-04-30 RX ORDER — METHYLPREDNISOLONE 4 MG/1
TABLET ORAL
Qty: 21 EACH | Refills: 0 | Status: SHIPPED | OUTPATIENT
Start: 2019-04-30 | End: 2019-05-09 | Stop reason: ALTCHOICE

## 2019-05-01 ENCOUNTER — APPOINTMENT (OUTPATIENT)
Dept: RADIOLOGY | Facility: CLINIC | Age: 34
End: 2019-05-01
Payer: COMMERCIAL

## 2019-05-01 DIAGNOSIS — M25.561 ACUTE PAIN OF RIGHT KNEE: ICD-10-CM

## 2019-05-01 PROCEDURE — 73562 X-RAY EXAM OF KNEE 3: CPT

## 2019-05-09 ENCOUNTER — OFFICE VISIT (OUTPATIENT)
Dept: FAMILY MEDICINE CLINIC | Facility: CLINIC | Age: 34
End: 2019-05-09
Payer: COMMERCIAL

## 2019-05-09 VITALS
RESPIRATION RATE: 18 BRPM | HEIGHT: 68 IN | OXYGEN SATURATION: 98 % | HEART RATE: 55 BPM | DIASTOLIC BLOOD PRESSURE: 68 MMHG | BODY MASS INDEX: 28.49 KG/M2 | WEIGHT: 188 LBS | SYSTOLIC BLOOD PRESSURE: 118 MMHG | TEMPERATURE: 98.7 F

## 2019-05-09 DIAGNOSIS — M25.561 ACUTE PAIN OF RIGHT KNEE: Primary | ICD-10-CM

## 2019-05-09 PROCEDURE — 99213 OFFICE O/P EST LOW 20 MIN: CPT | Performed by: FAMILY MEDICINE

## 2019-05-09 RX ORDER — PREDNISONE 10 MG/1
TABLET ORAL
Qty: 30 TABLET | Refills: 0 | Status: SHIPPED | OUTPATIENT
Start: 2019-05-09 | End: 2019-06-10

## 2019-06-10 ENCOUNTER — OFFICE VISIT (OUTPATIENT)
Dept: FAMILY MEDICINE CLINIC | Facility: CLINIC | Age: 34
End: 2019-06-10
Payer: COMMERCIAL

## 2019-06-10 VITALS
HEART RATE: 61 BPM | BODY MASS INDEX: 28.61 KG/M2 | OXYGEN SATURATION: 99 % | TEMPERATURE: 98.1 F | WEIGHT: 188.8 LBS | DIASTOLIC BLOOD PRESSURE: 82 MMHG | HEIGHT: 68 IN | SYSTOLIC BLOOD PRESSURE: 124 MMHG

## 2019-06-10 DIAGNOSIS — G89.29 CHRONIC PAIN OF RIGHT KNEE: Primary | ICD-10-CM

## 2019-06-10 DIAGNOSIS — M25.561 CHRONIC PAIN OF RIGHT KNEE: Primary | ICD-10-CM

## 2019-06-10 PROCEDURE — 3008F BODY MASS INDEX DOCD: CPT | Performed by: FAMILY MEDICINE

## 2019-06-10 PROCEDURE — 99213 OFFICE O/P EST LOW 20 MIN: CPT | Performed by: FAMILY MEDICINE

## 2019-06-10 RX ORDER — PREDNISONE 10 MG/1
TABLET ORAL
Qty: 30 TABLET | Refills: 0 | Status: SHIPPED | OUTPATIENT
Start: 2019-06-10 | End: 2019-06-25 | Stop reason: ALTCHOICE

## 2019-06-25 ENCOUNTER — CONSULT (OUTPATIENT)
Dept: OBGYN CLINIC | Facility: CLINIC | Age: 34
End: 2019-06-25
Payer: COMMERCIAL

## 2019-06-25 VITALS
WEIGHT: 191.3 LBS | BODY MASS INDEX: 28.99 KG/M2 | HEART RATE: 66 BPM | DIASTOLIC BLOOD PRESSURE: 74 MMHG | HEIGHT: 68 IN | RESPIRATION RATE: 16 BRPM | SYSTOLIC BLOOD PRESSURE: 108 MMHG

## 2019-06-25 DIAGNOSIS — R29.898 WEAKNESS OF BOTH HIPS: ICD-10-CM

## 2019-06-25 DIAGNOSIS — M17.11 PRIMARY OSTEOARTHRITIS OF RIGHT KNEE: Primary | ICD-10-CM

## 2019-06-25 DIAGNOSIS — M22.2X2 PATELLOFEMORAL SYNDROME OF BOTH KNEES: ICD-10-CM

## 2019-06-25 DIAGNOSIS — M22.2X1 PATELLOFEMORAL SYNDROME OF BOTH KNEES: ICD-10-CM

## 2019-06-25 PROCEDURE — 99243 OFF/OP CNSLTJ NEW/EST LOW 30: CPT | Performed by: FAMILY MEDICINE

## 2019-06-25 RX ORDER — MELOXICAM 15 MG/1
15 TABLET ORAL DAILY
Qty: 30 TABLET | Refills: 1 | Status: SHIPPED | OUTPATIENT
Start: 2019-06-25 | End: 2019-09-10 | Stop reason: SDUPTHER

## 2019-07-02 ENCOUNTER — EVALUATION (OUTPATIENT)
Dept: PHYSICAL THERAPY | Facility: CLINIC | Age: 34
End: 2019-07-02
Payer: COMMERCIAL

## 2019-07-02 DIAGNOSIS — M17.11 ARTHRITIS OF RIGHT KNEE: Primary | ICD-10-CM

## 2019-07-02 PROCEDURE — 97162 PT EVAL MOD COMPLEX 30 MIN: CPT | Performed by: PHYSICAL THERAPIST

## 2019-07-02 PROCEDURE — 97110 THERAPEUTIC EXERCISES: CPT | Performed by: PHYSICAL THERAPIST

## 2019-07-02 NOTE — PROGRESS NOTES
PT Evaluation     Today's date: 2019  Patient name: Adeel Benjamin  : 1985  MRN: [id number]  Referring provider: Shasha Gomez DO  Dx:   Encounter Diagnosis     ICD-10-CM    1  Arthritis of right knee M17 11        Assessment  Assessment details: Adeel Benjamin is a 37 y o  female presenting to outpatient physical therapy with diagnosis of arthritis of right knee  Patient's current impairments include pain, impaired soft tissue mobility, reduced range of motion, reduced strength, reduced postural awareness, and reduced activity tolerance  Patient's present functional limitations include difficulty with ADLs with increased need for assistance, reliance on medication and/or modalities for pain relief, poor tolerance for functional mobility and activity, and difficulty completing work responsibilities  Patient to benefit from skilled outpatient physical therapy 2x/week for 6 weeks in order to reduce pain, maximize pain free range of motion, increase strength and stability, and improve functional mobility/functional activity in order to maximize return to prior level of function with reduced limitations  Thank you for your referral     Impairments: abnormal gait, abnormal or restricted ROM, activity intolerance, impaired balance, impaired physical strength, lacks appropriate home exercise program and pain with function  Other impairment: Unable to run after children  Understanding of Dx/Px/POC: good   Prognosis: good    Goals  STGs to be achieved in 4 weeks:  1  Pt to demonstrate reduced subjective pain rating "at worst" by at least 2-3 points from Initial Eval in order to allow for reduced pain with ADLs and improved functional activity tolerance  2  Pt to demonstrate increased AROM of right knee by at least 5-10 degrees in order to allow for greater ease and independence with ADLs and functional mobility     3  Pt to demonstrate full PROM of right hamstring in order to maximize joint mobility and function and allow for progression of exercise program and achievement of goals  4  Pt to demonstrate increased MMT of right LE by at least 1/2-1 grade in order to improve safety and stability with ADLs and functional mobility  LTGs to be achieved in 6-8 weeks:  1  Pt will be I with HEP in order to continue to improve quality of life and independence and reduce risk for re-injury  2  Pt to demonstrate return to walking/jogging/running without limitations or restrictions  3  Pt to demonstrate improved function as noted by achieving or exceeding predicted score on FOTO outcomes assessment tool  Plan  Patient would benefit from: skilled physical therapy  Other planned modality interventions: Modalities for symptom management  Planned therapy interventions: manual therapy, Puentes taping, neuromuscular re-education, therapeutic exercise and home exercise program  Other planned therapy interventions: Jamari IASTM  Frequency: 2x week  Duration in weeks: 6  Treatment plan discussed with: patient        Subjective Evaluation    History of Present Illness  Mechanism of injury: Patient presents after referral from orthopedic clinic  She notes that pain started in February and has been getting worse  She reports she had 3 trials of prednisone that worked, but she doesn't want to stay on meds  She saw orthopedics and was referred to [facility]  for follow up    Quality of life: good    Pain  Current pain ratin  At best pain ratin  At worst pain ratin  Location: Right knee pain  Quality: knife-like and sharp  Relieving factors: ice, medications and rest  Progression: worsening    Social Support  Steps to enter house: yes  3  Stairs in house: yes   12  Lives in: multiple-level home  Lives with: spouse and young children (3 y/o, 2 y/o)    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)  Hand dominance: right      Diagnostic Tests  Abnormal x-ray: Mild osteoarthritic degenerative change noted with medial compartment joint space narrowing and osteophytosis noted  Treatments  Previous treatment: medication  Current treatment: physical therapy  Patient Goals  Patient goals for therapy: decreased pain, increased motion, increased strength, independence with ADLs/IADLs, return to sport/leisure activities and improved balance          Objective     Tenderness     Right Knee   Tenderness in the ITB, lateral patella and medial patella  Additional Tenderness Details  (+) piriformis pain    Neurological Testing     Sensation     Knee   Left Knee   Intact: light touch    Right Knee   Intact: light touch     Active Range of Motion     Right Knee   Flexion: 130 degrees   Prone flexion: 95 degrees with pain  Extension: -4 degrees   Extensor lag: [facility]    Additional Active Range of Motion Details  Hip ROM grossly WFL  Hamstring length limited 25% on right    Mobility   Patellar Mobility:     Right Knee   Hypomobile: medial, lateral, superior and inferior     Additional Mobility Details  Very guarded patellar mobility    Strength/Myotome Testing     Right Knee   Flexion: 4  Prone flexion: 4-  Extension: 4    Additional Strength Details  Hip flexion - 4/5  Hip extn - 4-/5  Hip ABd - 4/5  Hip ADd - 4/5  Hip IR - 4-/5  Hip ER - 4-/5    Tests     Right Knee   Negative anterior drawer, patellar apprehension, posterior drawer, valgus stress test at 0 degrees, valgus stress test at 30 degrees, varus stress test at 0 degrees and varus stress test at 30 degrees  Precautions: ITB irritability  Re-eval Date: 8/1/19    Date 7/2       Visit Count 1       FOTO See IE       Pain In See IE       Pain Out See IE           Manual                           UPCOMING:  Graston Instrument Assisted Soft Tissue Mobilization to right quad, ITB interfaces, calf; instrument # 4; gentle sweeping/fanning to tolerance sidelying  Pt reviewed and signed GIAST consent form on 7/2/19 without questions/concerns    No adverse reactions to treatment noted  Date 7/2       [facility] vs Nustep        HS stretch   1 min  X 2       Calf stretch   1 min  X 2       Hip flexor stretch 1 min  X 2       ITB stretch 1 min  X 2       Prone quad stretch 1 min  X 2       Heel slides          QS          HS sets           Hip add sets         Hip abd hooklying        SLRs           Bridges           TR/HR          Romberg          Tandem           SLS          Step ups  Fwd/lateral        Lunges           Mini squats          7/2/19:  HEP for self stretch this date as above  Modalities         Thermal CP x 10 mins                           cold pack x 10 minutes supine to right knee at end of session to pt tolerance  Pt was instructed to verbalize any pain/discomfort to PT during/following tx  Skin was intact pre/post tx without adverse reaction noted

## 2019-07-05 ENCOUNTER — OFFICE VISIT (OUTPATIENT)
Dept: PHYSICAL THERAPY | Facility: CLINIC | Age: 34
End: 2019-07-05
Payer: COMMERCIAL

## 2019-07-05 DIAGNOSIS — M17.11 ARTHRITIS OF RIGHT KNEE: Primary | ICD-10-CM

## 2019-07-05 PROCEDURE — 97110 THERAPEUTIC EXERCISES: CPT

## 2019-07-05 PROCEDURE — 97140 MANUAL THERAPY 1/> REGIONS: CPT

## 2019-07-05 NOTE — PROGRESS NOTES
Daily Note     Today's date: 2019  Patient name: Sofie Avila  : 1985  MRN: [id number]  Referring provider: Ainsley Castaneda DO  Dx:   Encounter Diagnosis     ICD-10-CM    1  Arthritis of right knee M17 11                 Precautions: ITB irritability  Re-eval Date: 19    Date       Visit Count 1 2      FOTO See IE       Pain In See IE 7-8      Pain Out See IE 6        Subjective: I did 14,000 steps yesterday increase R knee pain      Objective: See treatment diary below      Assessment: Tolerated treatment fairly well with progression of POC as tolerated  Noted Lateral patella tracking with trial of MCConnel taping with medial glide  Pt indicated good tolerance with taping with negotiation of stairs / ambulation   Patient would benefit from continued PT      Plan: Continue per plan of care  Manual           15 min                Graston Instrument Assisted Soft Tissue Mobilization to right quad, ITB interfaces, calf; instrument # 4; gentle sweeping/fanning to tolerance sidelying  - resume  Pt reviewed and signed AST consent form on 19 without questions/concerns  No adverse reactions to treatment noted  Rolling ITB ant/posterior to pt tolerance    Trial Mcconnel Taping with medial glide       Date        [address] [facility] vs Nustep  Nustep  L3 10'      HS stretch   1 min  X 2 1 min  X 2      Calf stretch   1 min  X 2 1 min  X 2      Hip flexor stretch 1 min  X 2 1 min  X 2      ITB stretch 1 min  X 2 1 min  X 2      Prone quad stretch 1 min  X 2 1 min  X 2      Heel slides          QS          HS sets           Hip add sets         Hip abd hooklying        SLRs     3x10  flex      Bridges     2x10, 5"      TR/HR    Foam  3x10      Romberg    upcoming      Tandem     upcoming      SLS    upcoming      Step ups  Fwd/lateral  upcoming      Lunges           Mini squats          19:  HEP for self stretch this date as above      Modalities         Thermal CP x 10 mins Pt def

## 2019-07-08 ENCOUNTER — OFFICE VISIT (OUTPATIENT)
Dept: PHYSICAL THERAPY | Facility: CLINIC | Age: 34
End: 2019-07-08
Payer: COMMERCIAL

## 2019-07-08 DIAGNOSIS — M17.11 ARTHRITIS OF RIGHT KNEE: Primary | ICD-10-CM

## 2019-07-08 PROCEDURE — 97110 THERAPEUTIC EXERCISES: CPT

## 2019-07-08 PROCEDURE — 97140 MANUAL THERAPY 1/> REGIONS: CPT

## 2019-07-08 NOTE — PROGRESS NOTES
Daily Note     Today's date: 2019  Patient name: Riley Busch  : 1985  MRN: [id number]  Referring provider: Jessica Adames DO  Dx:   Encounter Diagnosis     ICD-10-CM    1  Arthritis of right knee M17 11                   Subjective: I have increase right knee pain when I sit driving even just 10 min to get to PT  And also at night  Pain wakes me up as -8/10    Objective: See treatment diary below      Assessment: Tolerated treatment fairly well with progression of balance and strengthening exercises to pt tolerance  Denied any increase right knee pain with exercise/balance activities  Pt indicated increase pain as -8/10 with L SL position during 5' icing  Reduce pain reported as 10 with supine/knee extension end rx session  Noted R ITB tightness/tenderness 1* GT bursa  Pt encourage ice/CBAN R GT bursa   Patient would benefit from continued PT      Plan: Continue per plan of care  Precautions: ITB irritability  Re-eval Date: 19    Date      Visit Count 1 2 3     FOTO See IE       Pain In See IE      Pain Out See IE 6          Manual           15 min 15 min               Graston Instrument Assisted Soft Tissue Mobilization to right quad, ITB interfaces, calf; instrument # 4; gentle sweeping/fanning to tolerance sidelying  Pt reviewed and signed GIAST consent form on 19 without questions/concerns  No adverse reactions to treatment noted     Rolling ITB ant/posterior to pt tolerance    Trial Mcconnel Taping with medial glide       Date        [address] [facility] vs Nustep  Nustep  L3 10' Nustep  L3 10 min     HS stretch   1 min  X 2 1 min  X 2 review     Calf stretch   1 min  X 2 1 min  X 2 review     Hip flexor stretch 1 min  X 2 1 min  X 2 review     ITB stretch 1 min  X 2 1 min  X 2 2x 1 min     Prone quad stretch 1 min  X 2 1 min  X 2 review     Heel slides          QS          HS sets           Hip add sets         Hip abd hooklying        SLRs     3x10  flex Bridges     2x10, 5"      TR/HR    Foam  3x10 Foam  3x10     Romberg    upcoming EO/EC  Foam  30" ea     Tandem     upcoming EO/EC  Firm/foam  30" ea dir     SLS    upcoming EO  Firm/foam  30" ea      RLE only     Step ups  Fwd/lateral  upcoming 8 in  Fwd  30x   * Lat upcoming    Lunges      Fwd  Firm  10x ea  10x alt  10x alt BOSU    Lat  Firm  10x  Bosu  2x10     Mini squats     Foam  2x10, 5"  W/ dynamic reach     7/2/19:  HEP for self stretch this date as above      Modalities         Thermal CP x 10 mins Pt def CP 5 min   L SL  R ITB  Increase PAIN R knee

## 2019-07-11 ENCOUNTER — OFFICE VISIT (OUTPATIENT)
Dept: PHYSICAL THERAPY | Facility: CLINIC | Age: 34
End: 2019-07-11
Payer: COMMERCIAL

## 2019-07-11 DIAGNOSIS — M17.11 ARTHRITIS OF RIGHT KNEE: Primary | ICD-10-CM

## 2019-07-11 PROCEDURE — 97140 MANUAL THERAPY 1/> REGIONS: CPT | Performed by: PHYSICAL THERAPIST

## 2019-07-11 PROCEDURE — 97110 THERAPEUTIC EXERCISES: CPT | Performed by: PHYSICAL THERAPIST

## 2019-07-11 NOTE — PROGRESS NOTES
Daily Note     Today's date: 2019  Patient name: Abdi Mckee  : 1985  MRN: [id number]  Referring provider: Shobha Simmons DO  Dx:   Encounter Diagnosis     ICD-10-CM    1  Arthritis of right knee M17 11                   Subjective: I still have the pain, some relief with therapy, but doesn't last yet  Objective: See treatment diary below      Assessment: Tolerated treatment well  Patient demonstrated fatigue post treatment and would benefit from continued PT   Ongoing edema in her right patella  Plan: Continue per plan of care  Precautions: ITB irritability  Re-eval Date: 19    Date     Visit Count 1 2 3 4    FOTO See IE       Pain In See IE -8 -8 5-6    Pain Out See IE 6  4-5        Manual           15 min 15 min 15 mins              Graston Instrument Assisted Soft Tissue Mobilization  #4 and 5 to A-P interfaces of the ITB and framing right patella, sweeping/fanning in sidelying with knee extn, and seated to tolerance  ITB and piriformis stretch, end range HS stretch  Pt reviewed and signed GIAST consent form on 19 without questions/concerns  No adverse reactions to treatment noted     Rolling ITB ant/posterior to pt tolerance    Trial Mcconnel Taping with medial glide       Date        [address] [facility] vs Nustep  Nustep  L3 10' Nustep  L3 10 min SRB  10 mins    HS stretch   1 min  X 2 1 min  X 2 review 1 min  X 2    Calf stretch   1 min  X 2 1 min  X 2 review 1 min  X 2    Hip flexor stretch 1 min  X 2 1 min  X 2 review 1 min  X 2    ITB stretch 1 min  X 2 1 min  X 2 2x 1 min 1 min  X 2    Prone quad stretch 1 min  X 2 1 min  X 2 review 1 min  X 2    Heel slides          QS          HS sets           Hip add sets         Hip abd hooklying        SLRs     3x10  flex  3x10  flex    Bridges     2x10, 5"  2x10, 5"    TR/HR    Foam  3x10 Foam  3x10 Foam  3x10    Romberg    upcoming EO/EC  Foam  30" ea EO/EC  Foam  30" ea    Tandem     upcoming EO/EC  Firm/foam  30" ea dir EO/EC  Foam  30" ea    SLS    upcoming EO  Firm/foam  30" ea      RLE only EO  foam  30" ea    Step ups  Fwd/lateral  upcoming 8 in  Fwd  30x   8 in  Fwd and Lat  30x    Lunges      Fwd  Firm  10x ea  10x alt  10x alt BOSU    Lat  Firm  10x  Bosu  2x10  Resume   Mini squats     Foam  2x10, 5"  W/ dynamic reach  Resume   7/2/19:  HEP for self stretch this date as above      Modalities         Thermal CP x 10 mins Pt def CP 5 min   L SL  R ITB  Increase PAIN R knee Deferred for home

## 2019-07-15 ENCOUNTER — OFFICE VISIT (OUTPATIENT)
Dept: PHYSICAL THERAPY | Facility: CLINIC | Age: 34
End: 2019-07-15
Payer: COMMERCIAL

## 2019-07-15 DIAGNOSIS — M17.11 ARTHRITIS OF RIGHT KNEE: Primary | ICD-10-CM

## 2019-07-15 PROCEDURE — 97110 THERAPEUTIC EXERCISES: CPT

## 2019-07-15 PROCEDURE — 97140 MANUAL THERAPY 1/> REGIONS: CPT

## 2019-07-15 NOTE — PROGRESS NOTES
Daily Note     Today's date: 7/15/2019  Patient name: Jacqueline Dwyer  : 1985  MRN: [id number]  Referring provider: Gabe Corcoran DO  Dx:   Encounter Diagnosis     ICD-10-CM    1  Arthritis of right knee M17 11                   Subjective: Overall I feel improvement right knee pain  The taping really seems to help  I have been doing my stretches and seem to help for short timeframe      Objective: See treatment diary below      Assessment: Tolerated treatment fairly well  Pt indicated increase soft tissue discomfort with GISTM/MT with pt encourage to verbalize increase sx's t/o MT performed today  Good tolerance with taping and continues to demonstrate lateral patella tracking and tenderness ant/posterior interfaces R ITB  Pt educated STM R ITB trigger points and encourage to perform as tolerated  Patient would benefit from continued PT      Plan: Continue per plan of care  Precautions: ITB irritability  Re-eval Date: 19    Date  7/8 7/11 7/15   Visit Count 1 2 3 4 5   FOTO See IE       Pain In See IE 7-8 7-8 5-6 5-6   Pain Out See IE 6  4-5 5       Manual           15 min 15 min 15 mins 15 min             Graston Instrument Assisted Soft Tissue Mobilization  #4 and 5 to A-P interfaces of the ITB and framing right patella, sweeping/fanning in sidelying with knee extn, and seated to tolerance  ITB and piriformis stretch, end range HS stretch  Pt reviewed and signed GIAST consent form on 19 without questions/concerns  No adverse reactions to treatment noted     Rolling ITB ant/posterior to pt tolerance    Trial Mcconnel Taping with medial glide       Date        [address] [facility] vs Nustep  Nustep  L3 10' Nustep  L3 10 min SRB  10 mins Nustep  L3 10 min   HS stretch   1 min  X 2 1 min  X 2 review 1 min  X 2 1 min  X 2   Calf stretch   1 min  X 2 1 min  X 2 review 1 min  X 2 1 min  X 2   Hip flexor stretch 1 min  X 2 1 min  X 2 review 1 min  X 2 1 min  X 2   ITB stretch 1 min  X 2 1 min  X 2 2x 1 min 1 min  X 2 1 min  X 2   Prone quad stretch 1 min  X 2 1 min  X 2 review 1 min  X 2 1 min  X 2   Heel slides          QS          HS sets           Hip add sets         Hip abd hooklying        SLRs     3x10  flex  3x10  flex resume   Bridges     2x10, 5"  2x10, 5" resume   TR/HR    Foam  3x10 Foam  3x10 Foam  3x10 Foam  3x10   Romberg    upcoming EO/EC  Foam  30" ea EO/EC  Foam  30" ea resume   Tandem     upcoming EO/EC  Firm/foam  30" ea dir EO/EC  Foam  30" ea resume   SLS    upcoming EO  Firm/foam  30" ea      RLE only EO  foam  30" ea resume   Step ups  Fwd/lateral  upcoming 8 in  Fwd  30x   8 in  Fwd and Lat  30x 8 in  Fwd and Lat  30x   Lunges      Fwd  Firm  10x ea  10x alt  10x alt BOSU    Lat  Firm  10x  Bosu  2x10  Fwd/ firm  15 x alt   Mini squats     Foam  2x10, 5"  W/ dynamic reach  Wall/ball squats  15, 5"   Clams     Green  2x10   Hip 90/90     2x10   7/2/19:  HEP for self stretch this date as above      Modalities         Thermal CP x 10 mins Pt def CP 5 min   L SL  R ITB  Increase PAIN R knee Deferred for home Def to home

## 2019-07-18 ENCOUNTER — OFFICE VISIT (OUTPATIENT)
Dept: PHYSICAL THERAPY | Facility: CLINIC | Age: 34
End: 2019-07-18
Payer: COMMERCIAL

## 2019-07-18 DIAGNOSIS — M17.11 ARTHRITIS OF RIGHT KNEE: Primary | ICD-10-CM

## 2019-07-18 DIAGNOSIS — E03.9 HYPOTHYROIDISM, UNSPECIFIED TYPE: ICD-10-CM

## 2019-07-18 PROCEDURE — 97110 THERAPEUTIC EXERCISES: CPT

## 2019-07-18 PROCEDURE — 97140 MANUAL THERAPY 1/> REGIONS: CPT

## 2019-07-18 RX ORDER — LEVOTHYROXINE SODIUM 0.2 MG/1
200 TABLET ORAL
Qty: 30 TABLET | Refills: 5 | Status: SHIPPED | OUTPATIENT
Start: 2019-07-18 | End: 2020-04-16 | Stop reason: SDUPTHER

## 2019-07-18 NOTE — PROGRESS NOTES
Daily Note     Today's date: 2019  Patient name: Ibrahima Long  : 1985  MRN: [id number]  Referring provider: Braden Fontaine DO  Dx:   Encounter Diagnosis     ICD-10-CM    1  Arthritis of right knee M17 11                   Subjective: Overall pain is still about the same but the tape seems to help when it is on    Objective: See treatment diary below      Assessment: Tolerated treatment fairly well  Denied any increase  Right knee pain  Pt demonstrates lateral patella tracking with good tolerance with MT/tapping  Review HEP  Patient would benefit from continued PT      Plan: Continue per plan of care  Precautions: ITB irritability  Re-eval Date: 19    Date        Visit Count 6       FOTO        Pain In 4-5       Pain Out 5           Manual  15 min         Graston Instrument Assisted Soft Tissue Mobilization  #4 and 5 to A-P interfaces of the ITB and framing right patella, sweeping/fanning in sidelying with knee extn, and seated to tolerance  ITB and piriformis stretch, end range HS stretch  Pt reviewed and signed GIASTM consent form on 19 without questions/concerns  No adverse reactions to treatment noted     Rolling ITB ant/posterior to pt tolerance    Trial Mcconnel Taping with medial glide       TE        [facility] vs Nustep Nustep  L3 10 min       HS stretch   Review  DC HEP       Calf stretch   Review  DC HEP       Hip flexor stretch Review  DC HEP       ITB stretch Review  DC HEP       Prone quad stretch Review  DC HEP       Heel slides   Review  DC HEP       QS   Review  DC HEP       HS sets    Review  DC HEP       Hip add sets  Review  DC HEP       Hip abd hooklying        SLRs           Bridges           TR/HR          Romberg          Tandem    resume       SLS   resume       Step ups  Fwd/lateral 8 in  Fwd and Lat  30x  NP       Lunges    BOSU  R/L x15 fwd  R 15 side       Mini squats   Pball  2x10, 5"       Clams        Hip 90/90 Quad  Hip 90/90       Side step Brad  4x40'       HS Curl machine 22# 3x10       7/2/19:  HEP for self stretch this date as above      Modalities  Pt def       Thermal

## 2019-07-22 ENCOUNTER — OFFICE VISIT (OUTPATIENT)
Dept: PHYSICAL THERAPY | Facility: CLINIC | Age: 34
End: 2019-07-22
Payer: COMMERCIAL

## 2019-07-22 DIAGNOSIS — M17.11 ARTHRITIS OF RIGHT KNEE: Primary | ICD-10-CM

## 2019-07-22 PROCEDURE — 97535 SELF CARE MNGMENT TRAINING: CPT

## 2019-07-22 PROCEDURE — 97110 THERAPEUTIC EXERCISES: CPT

## 2019-07-22 NOTE — PROGRESS NOTES
Daily Note     Today's date: 2019  Patient name: Adeel Benjamin  : 1985  MRN: [id number]  Referring provider: Shasha Gomez DO  Dx:   Encounter Diagnosis     ICD-10-CM    1  Arthritis of right knee M17 11                   Subjective: I am starting to have increase pain medial/posterior right knee With the elevated heat my varicose veins hurt more  I dont know what I should be focusing on with HEP as most days I dont have even 15 min    Objective: See treatment diary below    Assessment: Tolerated treatment fairly well  1* focus today review and issued HEP handouts with education of importance of compliancy to maximize PT goals  Pt stated is unable to participate with carryover with HEP with busy family life style at this time and wanted to know if she only had 15 min what to focus on  Pt educated benefits with all aspects of POC strengthening/strecthing/stability and how they address pt dx  Pt trial KT tape today and to review upcoming self application  Patient would benefit from continued PT to address VMO strengthening, inflexibility R LE and patella tracking    Plan: Continue per plan of care  Precautions: ITB irritability  Re-eval Date: 19    Date       Visit Count 6 7      FOTO        Pain In 4-5 7      Pain Out 5           Manual  15 min Self stretch  5 min  KT tape today trial with pt education/application lat patella tracking right knee        Graston Instrument Assisted Soft Tissue Mobilization  #4 and 5 to A-P interfaces of the ITB and framing right patella, sweeping/fanning in sidelying with knee extn, and seated to tolerance  ITB and piriformis stretch, end range HS stretch  Pt reviewed and signed GIAST consent form on 19 without questions/concerns  No adverse reactions to treatment noted     Rolling ITB ant/posterior to pt tolerance    Trial Mcconnel Taping with medial glide       TE  *Modified x 1 visit      [address] [facility] vs Nustep Nustep  L3 10 min Nustep  L3 10 min      HS stretch   Review   1x1 min      Calf stretch   Review  DC HEP       Hip flexor stretch Review   1x 1 min      ITB stretch Review   1x 1 min      Prone quad stretch Review         Heel slides   Review  DC HEP       QS   Review  DC HEP       HS sets    Review  DC HEP       Hip add sets  Review  DC HEP       Hip abd hooklying        SLRs           Bridges           TR/HR          Romberg    review      Tandem    resume review      SLS   resume review      Step ups  Fwd/lateral 8 in  Fwd and Lat  30x  NP resume      Lunges    BOSU  R/L x15 fwd  R 15 side resume      Mini squats   Pball  2x10, 5" review      Clams  review      Hip 90/90 Quad  Hip 90/90 review      Side step Green  4x40' review      HS Curl machine 22# 3x10 review      7/2/19:  HEP for self stretch this date as above    * 7/22/19 HEP review/issued SLR flex w/ ER, ADD with bridges, clams/hip 90/90, HS sets, squats, step ups, balance and self stretches  Modalities  Pt def       Thermal

## 2019-07-25 ENCOUNTER — OFFICE VISIT (OUTPATIENT)
Dept: PHYSICAL THERAPY | Facility: CLINIC | Age: 34
End: 2019-07-25
Payer: COMMERCIAL

## 2019-07-25 DIAGNOSIS — M17.11 ARTHRITIS OF RIGHT KNEE: Primary | ICD-10-CM

## 2019-07-25 PROCEDURE — 97110 THERAPEUTIC EXERCISES: CPT | Performed by: PHYSICAL THERAPIST

## 2019-07-25 PROCEDURE — 97140 MANUAL THERAPY 1/> REGIONS: CPT | Performed by: PHYSICAL THERAPIST

## 2019-07-25 NOTE — PROGRESS NOTES
Daily Note     Today's date: 2019  Patient name: Sofie Avila  : 1985  MRN: [id number]  Referring provider: Ainsley Castaneda DO  Dx:   Encounter Diagnosis     ICD-10-CM    1  Arthritis of right knee M17 11                   Subjective: I don't know if this is getting any better  I still get pain  I don't have a lot of time to do all of the exercises  Objective: See treatment diary below      Assessment: Tolerated treatment well  Patient demonstrated fatigue post treatment and would benefit from continued PT  Reviewed self stretch, cues for technique  Plan: Continue per plan of care  Precautions: ITB irritability  Re-eval Date: 19    Date      Visit Count 6 7 8     FOTO        Pain In 4-5 7      Pain Out 5           Manual  15 min Self stretch  5 min  KT tape today trial with pt education/application lat patella tracking right knee Foam roller x 15 mins - ITB  Manual stretch/DTM to right piriformis x 10 mins       Graston Instrument Assisted Soft Tissue Mobilization  #4 and 5 to A-P interfaces of the ITB and framing right patella, sweeping/fanning in sidelying with knee extn, and seated to tolerance  ITB and piriformis stretch, end range HS stretch  Pt reviewed and signed GIASTM consent form on 19 without questions/concerns  No adverse reactions to treatment noted     Rolling ITB ant/posterior to pt tolerance    Trial Mcconnel Taping with medial glide       TE  *Modified x 1 visit      [address] [facility] vs Nustep Nustep  L3 10 min Nustep  L3 10 min      HS stretch   Review   1x1 min 1x1 min     Calf stretch   Review  DC HEP  1x1 min     Hip flexor stretch Review   1x 1 min 1x1 min     ITB stretch Review   1x 1 min 1x1 min     Piriformis stretch   1x1 min     Prone quad stretch Review         Heel slides   Review  DC HEP       QS   Review  DC HEP       HS sets    Review  DC HEP       Hip add sets  Review  DC HEP       Hip abd hooklying   Green TB  2x10  With bridge SLRs           Bridges           TR/HR          Romberg    review      Tandem    resume review      SLS   resume review      Step ups  Fwd/lateral 8 in  Fwd and Lat  30x  NP resume  resume    Lunges    BOSU  R/L x15 fwd  R 15 side resume  resume    Mini squats   Pball  2x10, 5" review  resume    Clams  review Green TB  2x10     Hip 90/90 Quad  Hip 90/90 review Green TB  2x10     Side step Green  4x40' review      HS Curl machine 22# 3x10 review      7/2/19:  HEP for self stretch this date as above  * 7/22/19 HEP review/issued SLR flex w/ ER, ADD with bridges, clams/hip 90/90, HS sets, squats, step ups, balance and self stretches  Modalities  Pt def       Thermal   10 mins CP     cold pack x 10 minutes sidelying to right ITB at end of session to pt tolerance  Pt was instructed to verbalize any pain/discomfort to PT during/following tx  Skin was intact pre/post tx without adverse reaction noted

## 2019-08-05 ENCOUNTER — EVALUATION (OUTPATIENT)
Dept: PHYSICAL THERAPY | Facility: CLINIC | Age: 34
End: 2019-08-05
Payer: COMMERCIAL

## 2019-08-05 DIAGNOSIS — M17.11 ARTHRITIS OF RIGHT KNEE: Primary | ICD-10-CM

## 2019-08-05 PROCEDURE — 97164 PT RE-EVAL EST PLAN CARE: CPT | Performed by: PHYSICAL THERAPIST

## 2019-08-05 PROCEDURE — 97110 THERAPEUTIC EXERCISES: CPT | Performed by: PHYSICAL THERAPIST

## 2019-08-05 PROCEDURE — 97140 MANUAL THERAPY 1/> REGIONS: CPT | Performed by: PHYSICAL THERAPIST

## 2019-08-05 NOTE — PROGRESS NOTES
PT Re-Evaluation     Today's date: 2019  Patient name: Kymberly Senior  : 1985  MRN: [id number]  Referring provider: Ham Shearer DO  Dx:   Encounter Diagnosis     ICD-10-CM    1  Arthritis of right knee M17 11        Assessment  Assessment details: Kymberly Senior is a 37 y o  female presenting to outpatient physical therapy with diagnosis of arthritis of right knee  Patient has been making steady progress, improving strength, decreased pain  She does note she has not returned to all heavier activities or running at this time, but is feeling better  Plans to return to running this week  Will continue to with OPPT  Thank you for this referral and the ability to participate in the ongoing care of this patient  Impairments: abnormal gait, abnormal or restricted ROM, activity intolerance, impaired balance, impaired physical strength, lacks appropriate home exercise program and pain with function  Other impairment: Unable to run after children  Understanding of Dx/Px/POC: good   Prognosis: good    Goals  STGs to be achieved in 4 weeks:  1  Pt to demonstrate reduced subjective pain rating "at worst" by at least 2-3 points from Initial Eval in order to allow for reduced pain with ADLs and improved functional activity tolerance - MET  2  Pt to demonstrate increased AROM of right knee by at least 5-10 degrees in order to allow for greater ease and independence with ADLs and functional mobility - MET  3  Pt to demonstrate full PROM of right hamstring in order to maximize joint mobility and function and allow for progression of exercise program and achievement of goals - MET   4  Pt to demonstrate increased MMT of right LE by at least 1/2-1 grade in order to improve safety and stability with ADLs and functional mobility - MET    LTGs to be achieved in 6-8 weeks: - ONGOING  1  Pt will be I with HEP in order to continue to improve quality of life and independence and reduce risk for re-injury  2  Pt to demonstrate return to walking/jogging/running without limitations or restrictions  3  Pt to demonstrate improved function as noted by achieving or exceeding predicted score on FOTO outcomes assessment tool  Plan  Patient would benefit from: skilled physical therapy  Other planned modality interventions: Modalities for symptom management  Planned therapy interventions: manual therapy, Puentes taping, neuromuscular re-education, therapeutic exercise and home exercise program  Frequency: 2x week  Duration in weeks: 4  Treatment plan discussed with: patient        Subjective Evaluation    History of Present Illness  Mechanism of injury: UPDATE:  Patient has next follow up appointment on Friday with physician  She notes that she has made some improvement, HEP more focused  Has not returned to running any significant distances at this time  Plans to return to running upcoming  Patient presents after referral from orthopedic clinic  She notes that pain started in February and has been getting worse  She reports she had 3 trials of prednisone that worked, but she doesn't want to stay on meds  She saw orthopedics and was referred to [facility]  for follow up  Quality of life: good    Pain  Current pain ratin  At best pain ratin  At worst pain ratin  Location: Right knee pain  Quality: dull ache  Relieving factors: ice, medications and rest  Progression: improved    Social Support  Steps to enter house: yes  3  Stairs in house: yes   12  Lives in: multiple-level home  Lives with: spouse and young children (3 y/o, 2 y/o)    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)  Hand dominance: right      Diagnostic Tests  Abnormal x-ray: Mild osteoarthritic degenerative change noted with medial compartment joint space narrowing and osteophytosis noted    Treatments  Previous treatment: medication  Current treatment: physical therapy  Patient Goals  Patient goals for therapy: decreased pain, increased motion, increased strength, independence with ADLs/IADLs, return to sport/leisure activities and improved balance          Objective     Tenderness     Right Knee   Tenderness in the ITB  No tenderness in the lateral patella and medial patella  Additional Tenderness Details  (+) piriformis pain - improved  (+) ITB tenderness -distally    Neurological Testing     Sensation     Knee   Left Knee   Intact: light touch    Right Knee   Intact: light touch     Active Range of Motion     Right Knee   Flexion: 130 degrees   Prone flexion: 100 degrees   Extension: -4 degrees   Extensor lag: WFL    Additional Active Range of Motion Details  Hip ROM grossly WFL  Hamstring length limited 25% on right - resolved    Mobility   Patellar Mobility:     Right Knee   WFL: medial and lateral  Hypomobile: superior and inferior     Additional Mobility Details  Very guarded patellar mobility - improved    Strength/Myotome Testing     Right Knee   Flexion: 5  Prone flexion: 4+  Extension: 5    Additional Strength Details  Hip flexion - 5/5  Hip extn - 4+/5  Hip ABd - 4+/5  Hip ADd - 5/5  Hip IR - 4/5  Hip ER - 4/5    Tests     Right Knee   Negative anterior drawer, patellar apprehension, posterior drawer, valgus stress test at 0 degrees, valgus stress test at 30 degrees, varus stress test at 0 degrees and varus stress test at 30 degrees          Precautions: ITB irritability  Re-eval Date: 8/1/19    Date 7/18 7/22 7/25 8/5    Visit Count 6 7 8 9    FOTO        Pain In 4-5 7      Pain Out 5           Manual  15 min Self stretch  5 min  KT tape today trial with pt education/application lat patella tracking right knee Foam roller x 15 mins - ITB  Manual stretch/DTM to right piriformis x 10 mins Manual stretch/DTM to right piriformis x 10 mins      Graston Instrument Assisted Soft Tissue Mobilization - NP  #4 and 5 to A-P interfaces of the ITB and framing right patella, sweeping/fanning in sidelying with knee extn, and seated to tolerance  ITB and piriformis stretch, end range HS stretch  Pt reviewed and signed GIASTM consent form on 7/2/19 without questions/concerns  No adverse reactions to treatment noted  Rolling ITB ant/posterior to pt tolerance    Trial Mcconnel Taping with medial glide - REVIEWED WITH PATIENT      TE  *Modified x 1 visit  Modified 2/2 [facility] vs Nustep Nustep  L3 10 min Nustep  L3 10 min  Nustep  L3 15 min    HS stretch   Review   1x1 min 1x1 min     Calf stretch   Review  DC HEP  1x1 min     Hip flexor stretch Review   1x 1 min 1x1 min     ITB stretch Review   1x 1 min 1x1 min     Piriformis stretch   1x1 min     Prone quad stretch Review         Heel slides   Review  DC HEP       QS   Review  DC HEP       HS sets    Review  DC HEP       Hip add sets  Review  DC HEP       Hip abd hooklying   Green TB  2x10  With bridge Green TB  2x10  With bridge    [employer]           TR/HR          Romberg    review      Tandem    resume review      SLS   resume review      Step ups  Fwd/lateral 8 in  Fwd and Lat  30x  NP resume  resume    Lunges    BOSU  R/L x15 fwd  R 15 side resume  resume    Mini squats   Pball  2x10, 5" review  resume    Clams  review Green TB  2x10 Green TB  2x10    Hip 90/90 Quad  Hip 90/90 review Green TB  2x10 Green TB  2x10    Side step Green  4x40' review      HS Curl machine 22# 3x10 review      7/2/19:  HEP for self stretch this date as above  * 7/22/19 HEP review/issued SLR flex w/ ER, ADD with bridges, clams/hip 90/90, HS sets, squats, step ups, balance and self stretches  Modalities  Pt def       Thermal   10 mins CP     cold pack x 10 minutes sidelying to right ITB at end of session to pt tolerance  Pt was instructed to verbalize any pain/discomfort to PT during/following tx  Skin was intact pre/post tx without adverse reaction noted

## 2019-08-08 ENCOUNTER — OFFICE VISIT (OUTPATIENT)
Dept: PHYSICAL THERAPY | Facility: CLINIC | Age: 34
End: 2019-08-08
Payer: COMMERCIAL

## 2019-08-08 DIAGNOSIS — M17.11 ARTHRITIS OF RIGHT KNEE: Primary | ICD-10-CM

## 2019-08-08 PROCEDURE — 97140 MANUAL THERAPY 1/> REGIONS: CPT

## 2019-08-08 PROCEDURE — 97110 THERAPEUTIC EXERCISES: CPT

## 2019-08-08 NOTE — PROGRESS NOTES
Daily Note     Today's date: 2019  Patient name: Maurizio Truong  : 1985  MRN: [id number]  Referring provider: Rolo Majano DO  Dx:   Encounter Diagnosis     ICD-10-CM    1  Arthritis of right knee M17 11                   Subjective: I tried running after last PT session  Did not go well  R knee cap popped and felt stuck       Objective: See treatment diary below      Assessment: Tolerated treatment well  Denied any increase pain with TE performed  Trial light jog with McConnel taping with pt indicating not much relief but stated feels better walking with the tape on vs off  Pt indicated MD may provide/consider a knee brace for aerobic activities  Reduced lateral patella tracking noted today  Review HEP Patient would benefit from continued PT      Plan: Continue per plan of care  Precautions: ITB irritability  Re-eval Date: 19    Date    Visit Count 6 7 8 9 10   FOTO        Pain In 4-5 7   4-5   Pain Out 5    4       Manual  15 min Self stretch  5 min  KT tape today trial with pt education/application lat patella tracking right knee Foam roller x 15 mins - ITB  Manual stretch/DTM to right piriformis x 10 mins Manual stretch/DTM to right piriformis x 10 mins Manual stretch/DTM to right piriformis x 10 mins     Graston Instrument Assisted Soft Tissue Mobilization - NP  #4 and 5 to A-P interfaces of the ITB and framing right patella, sweeping/fanning in sidelying with knee extn, and seated to tolerance  ITB and piriformis stretch, end range HS stretch  Pt reviewed and signed GIASTM consent form on 19 without questions/concerns  No adverse reactions to treatment noted     Rolling ITB ant/posterior to pt tolerance    Trial Mcconnel Taping with medial glide - REVIEWED WITH PATIENT      TE  *Modified x 1 visit  Modified  picking children up    [facility] vs Nustep Nustep  L3 10 min Nustep  L3 10 min  Nustep  L3 15 min TM 3 0 mph  10 min warm up    Jog out side w/ mcConnel tape   HS stretch   Review   1x1 min 1x1 min  1x1 min   Calf stretch   Review  DC HEP  1x1 min     Hip flexor stretch Review   1x 1 min 1x1 min  1x1 min   ITB stretch Review   1x 1 min 1x1 min  1x1 min  Doorway   Piriformis stretch   1x1 min  1x1 min   Prone quad stretch Review         Heel slides   Review  DC HEP       QS   Review  DC HEP       HS sets    Review  DC HEP       Hip add sets  Review  DC HEP       Hip abd hooklying   Green TB  2x10  With bridge Green TB  2x10  With bridge ADD with Bridges  20x 5"   SLRs           Bridges           TR/HR          Romberg    review      Tandem    resume review      SLS   resume review      Step ups  Fwd/lateral 8 in  Fwd and Lat  30x  NP resume  resume 2x stairs w/tape   Lunges    BOSU  R/L x15 fwd  R 15 side resume  resume    Mini squats   Pball  2x10, 5" review  resume    Clams  review Green TB  2x10 Green TB  2x10 Green TB  2x10   Hip 90/90 Quad  Hip 90/90 review Green TB  2x10 Green TB  2x10 Green TB  2x10   Side step Green  4x40' review      HS Curl machine 22# 3x10 review              7/2/19:  HEP for self stretch this date as above  * 7/22/19 HEP review/issued SLR flex w/ ER, ADD with bridges, clams/hip 90/90, HS sets, squats, step ups, balance and self stretches  Modalities  Pt def       Thermal   10 mins CP     cold pack x 10 minutes sidelying to right ITB at end of session to pt tolerance  Pt was instructed to verbalize any pain/discomfort to PT during/following tx  Skin was intact pre/post tx without adverse reaction noted

## 2019-08-09 ENCOUNTER — OFFICE VISIT (OUTPATIENT)
Dept: OBGYN CLINIC | Facility: CLINIC | Age: 34
End: 2019-08-09
Payer: COMMERCIAL

## 2019-08-09 VITALS
DIASTOLIC BLOOD PRESSURE: 71 MMHG | HEART RATE: 68 BPM | RESPIRATION RATE: 16 BRPM | HEIGHT: 68 IN | WEIGHT: 192.3 LBS | SYSTOLIC BLOOD PRESSURE: 103 MMHG | BODY MASS INDEX: 29.15 KG/M2

## 2019-08-09 DIAGNOSIS — M25.361 KNEE INSTABILITY, RIGHT: ICD-10-CM

## 2019-08-09 DIAGNOSIS — M25.461 PAIN AND SWELLING OF RIGHT KNEE: Primary | ICD-10-CM

## 2019-08-09 DIAGNOSIS — M25.561 PAIN AND SWELLING OF RIGHT KNEE: Primary | ICD-10-CM

## 2019-08-09 PROCEDURE — 99213 OFFICE O/P EST LOW 20 MIN: CPT | Performed by: FAMILY MEDICINE

## 2019-08-09 RX ORDER — DIPHENOXYLATE HYDROCHLORIDE AND ATROPINE SULFATE 2.5; .025 MG/1; MG/1
1 TABLET ORAL DAILY
COMMUNITY

## 2019-08-09 NOTE — PROGRESS NOTES
Assessment/Plan:  Assessment/Plan   Diagnoses and all orders for this visit:    Pain and swelling of right knee  -     MRI knee right  wo contrast; Future    Knee instability, right  -     MRI knee right  wo contrast; Future    Other orders  -     GLUCOSAMINE-CHONDROITIN PO; Take by mouth  -     multivitamin (THERAGRAN) TABS; Take 1 tablet by mouth daily          77-year-old active female with right knee pain of more than 6 months duration  Discussed with patient physical exam, impression and plan  Physical exam is noted for tenderness upon palpation of the medial and lateral joint lines and patellar undersurface  There is palpable crepitus and clicking with patellar translation  She has normal range of motion of the knee  Meniscal testing is unremarkable but there is positive patellar inhibition and grind  She is now more than 6 months since onset of symptoms of pain and instability and has not improved with conservative management a form of formal physical therapy since 07/02/2019 and and taking prescribed prednisone taper 05/09/2019 and again on 06/10/2019, and taking meloxicam 15 mg once daily food since 06/25/2019  At this time I will refer for MRI of the knee to evaluate for internal derangement, as more invasive management may be warranted  She will follow up with me after getting MRI done  Subjective:   Patient ID: Ozzie Shaver is a 37 y o  female  Chief Complaint   Patient presents with    Right Knee - Pain, Follow-up       77-year-old active female osteoarthritis of the right knee following up for right knee pain more than 6 months duration  She was last seen 6 weeks ago at which point she was assessed to have osteoarthritis and was prescribed meloxicam 15 mg once daily  He was also referred to formal physical therapy to address patellofemoral syndrome  She has been doing formal physical therapy home exercises since 07/02/2019 and also been taking prescribed medications as directed  Prior to start of meloxicam she completed 2 courses of prednisone tapers, 1st course was 05/09/2019 and 2nd course was on 06/10/2019  She denies any improvement since her last visit  She still having pain described as generalized to the knee but worse at the anterior lateral aspect, achy and sometimes sharp, worse with physical activity, associated swelling, associated instability/buckling, and improved with rest   She reports that with running for less than 1 minute her knee gave out on her and she fell  Knee Pain   This is a chronic problem  The current episode started more than 1 month ago  The problem occurs intermittently  The problem has been unchanged  Associated symptoms include arthralgias and joint swelling  Pertinent negatives include no numbness or weakness  The symptoms are aggravated by twisting (Running)  She has tried NSAIDs (Physical therapy) for the symptoms  The treatment provided no relief  Review of Systems   Musculoskeletal: Positive for arthralgias and joint swelling  Neurological: Negative for weakness and numbness  Objective:  Vitals:    08/09/19 1006   BP: 103/71   BP Location: Left arm   Patient Position: Sitting   Cuff Size: Large   Pulse: 68   Resp: 16   Weight: 87 2 kg (192 lb 4 8 oz)   Height: 5' 7 5" (1 715 m)     Right Knee Exam     Muscle Strength   The patient has normal right knee strength  Tenderness   The patient is experiencing tenderness in the lateral joint line and medial joint line (Patellar undersurface)  Range of Motion   The patient has normal right knee ROM      Tests   Dayana:  Medial - negative Lateral - negative  Varus: negative Valgus: negative  Lachman:  Anterior - negative        Other   Swelling: none  Effusion: no effusion present    Comments:  Positive patellar inhibition and grind  Crepitus      Right Hip Exam     Muscle Strength   Flexion: 5/5     Tests   WAYNE: negative    Comments:  Negative FADDIR          Observations Right Knee   Negative for effusion  Physical Exam   Constitutional: She is oriented to person, place, and time  She appears well-developed  No distress  HENT:   Head: Normocephalic  Eyes: Conjunctivae are normal    Neck: No tracheal deviation present  Cardiovascular: Normal rate  Pulmonary/Chest: Effort normal  No respiratory distress  Abdominal: She exhibits no distension  Musculoskeletal:        Right knee: She exhibits no effusion  Neurological: She is alert and oriented to person, place, and time  Skin: Skin is warm and dry  Psychiatric: She has a normal mood and affect  Her behavior is normal    Nursing note and vitals reviewed

## 2019-08-12 ENCOUNTER — OFFICE VISIT (OUTPATIENT)
Dept: PHYSICAL THERAPY | Facility: CLINIC | Age: 34
End: 2019-08-12
Payer: COMMERCIAL

## 2019-08-12 DIAGNOSIS — M17.11 ARTHRITIS OF RIGHT KNEE: Primary | ICD-10-CM

## 2019-08-12 PROCEDURE — 97110 THERAPEUTIC EXERCISES: CPT

## 2019-08-12 NOTE — PROGRESS NOTES
Daily Note     Today's date: 2019  Patient name: Abdi Mckee  : 1985  MRN: [id number]  Referring provider: Shobha Simmons DO  Dx:   Encounter Diagnosis     ICD-10-CM    1  Arthritis of right knee M17 11                   Subjective: I am going for MRI for R knee this Thursday  I see Dr iLam Rice on Friday to discuss findings      Objective: See treatment diary below      Assessment: Tolerated treatment well  Denied any increase left knee pain  Reported increase mm fatigue BL hips  Pt with good tolerance / improved patella tracking with crews taping   Patient would benefit from continued PT      Plan: Continue per plan of care  MRI upcoming with f/u with findings MD Friday     Precautions: ITB irritability  Re-eval Date: 19    Date        Visit Count 11       FOTO NV       Pain In        Pain Out            Manual  5 min crews taping  Pt self stretch  Review self massage L ITB         Graston Instrument Assisted Soft Tissue Mobilization - NP  #4 and 5 to A-P interfaces of the ITB and framing right patella, sweeping/fanning in sidelying with knee extn, and seated to tolerance  ITB and piriformis stretch, end range HS stretch  Pt reviewed and signed GIASTM consent form on 19 without questions/concerns  No adverse reactions to treatment noted     Rolling ITB ant/posterior to pt tolerance    Trial Mcconnel Taping with medial glide - REVIEWED WITH PATIENT      TE        [facility] vs Nustep Elliptical  10 min  QS       HS stretch   2x1 min         Calf stretch   2x1 min       Hip flexor stretch 2x1 min         ITB stretch 2x1 min         Piriformis stretch 2x1 min       Prone quad stretch Review         Heel slides   Review  DC HEP       QS   Review  DC HEP       HS sets    Review  DC HEP       Hip add sets  Review  DC HEP       Hip abd hooklying        SLRs           Bridges           TR/HR          Romberg          Tandem    resume       SLS   resume       Step ups  Fwd/lateral Bottom step   Lat  30x       Lunges    BOSU  R/L x20 fwd  R 20 side       Mini squats   Pball  2x10, 5"       Clams Review       Hip 90/90 Blue  2x10       Side step Green  4x40'       HS Curl machine 22# 3x10 NP               7/2/19:  HEP for self stretch this date as above  * 7/22/19 HEP review/issued SLR flex w/ ER, ADD with bridges, clams/hip 90/90, HS sets, squats, step ups, balance and self stretches  Modalities  Pt def       Thermal        cold pack x 10 minutes sidelying to right ITB at end of session to pt tolerance  Pt was instructed to verbalize any pain/discomfort to PT during/following tx  Skin was intact pre/post tx without adverse reaction noted

## 2019-08-15 ENCOUNTER — APPOINTMENT (OUTPATIENT)
Dept: PHYSICAL THERAPY | Facility: CLINIC | Age: 34
End: 2019-08-15
Payer: COMMERCIAL

## 2019-08-16 ENCOUNTER — HOSPITAL ENCOUNTER (OUTPATIENT)
Dept: MRI IMAGING | Facility: HOSPITAL | Age: 34
Discharge: HOME | End: 2019-08-16
Payer: COMMERCIAL

## 2019-08-16 DIAGNOSIS — M25.461 PAIN AND SWELLING OF RIGHT KNEE: ICD-10-CM

## 2019-08-16 DIAGNOSIS — M25.561 PAIN AND SWELLING OF RIGHT KNEE: ICD-10-CM

## 2019-08-16 DIAGNOSIS — M25.361 KNEE INSTABILITY, RIGHT: ICD-10-CM

## 2019-08-16 PROCEDURE — 73721 MRI JNT OF LWR EXTRE W/O DYE: CPT

## 2019-08-20 ENCOUNTER — OFFICE VISIT (OUTPATIENT)
Dept: OBGYN CLINIC | Facility: CLINIC | Age: 34
End: 2019-08-20
Payer: COMMERCIAL

## 2019-08-20 VITALS
HEIGHT: 68 IN | HEART RATE: 52 BPM | RESPIRATION RATE: 16 BRPM | DIASTOLIC BLOOD PRESSURE: 75 MMHG | WEIGHT: 192.3 LBS | SYSTOLIC BLOOD PRESSURE: 113 MMHG | BODY MASS INDEX: 29.15 KG/M2

## 2019-08-20 DIAGNOSIS — M23.8X1 CHONDRAL DEFECT OF CONDYLE OF RIGHT FEMUR: Primary | ICD-10-CM

## 2019-08-20 DIAGNOSIS — M67.40 GANGLION CYST: ICD-10-CM

## 2019-08-20 PROCEDURE — 99213 OFFICE O/P EST LOW 20 MIN: CPT | Performed by: FAMILY MEDICINE

## 2019-08-20 NOTE — PROGRESS NOTES
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  -     Injection procedure prior authorization; Future    Ganglion cyst      57-year-old active female with right knee pain of more than 6 months duration  Discussed with patient MRI results, impression, plan  MRI of the right knee is noted for 4 x 4 mm full-thickness chondral defect lateral femoral condyle at the level of the posterior horn medial meniscus, mild-to-moderate full-thickness chondral deficiency medial patellar facet, multiple focus grade 2/3 chondral thinning medial eminence and lateral facet of the patella, and grade 2 chondral thinning mid medial compartment  There is 1 3 x 1 3 x 0 9 cm ganglion anterior medial to the distal PCL insertion  Since she is more than 6 months with symptoms and has not improved with formal physical therapy and meloxicam 15 mg once daily, discussed with patient that she may benefit from viscosupplementation  She is to proceed with one-shot Visco supplement  She is to continue with home exercises  She will return for Visco supplement injection once approved  Subjective:   Patient ID: Tammi Rowe is a 37 y o  female  Chief Complaint   Patient presents with    Right Knee - Pain, Follow-up       57-year-old active female presents for evaluation of right knee pain of more than 6 months duration  She was last seen 11 days ago at which point she was referred for MRI of the knee  She denies any changes in her symptoms since her last visit  She experiences pain described as generalized to the knee but worse at the anterior lateral aspect, achy and sometimes sharp, worse with physical activity, associated with swelling, associated with buckling/instability, and improved with rest   She has been unable to do her normal running and physical activities due to her pain  Knee Pain   This is a chronic problem   The current episode started more than 1 month ago  The problem occurs intermittently  The problem has been unchanged  Associated symptoms include arthralgias  Pertinent negatives include no joint swelling, numbness or weakness  The symptoms are aggravated by walking and twisting (Running)  She has tried rest and NSAIDs (Physical therapy) for the symptoms  The treatment provided no relief  Review of Systems   Musculoskeletal: Positive for arthralgias  Negative for joint swelling  Neurological: Negative for weakness and numbness  Objective:  Vitals:    08/20/19 1056   BP: 113/75   Pulse: (!) 52   Resp: 16   Weight: 87 2 kg (192 lb 4 8 oz)   Height: 5' 7 5" (1 715 m)     Ortho Exam    Physical Exam   Constitutional: She is oriented to person, place, and time  She appears well-developed  No distress  HENT:   Head: Normocephalic  Eyes: Conjunctivae are normal    Neck: No tracheal deviation present  Cardiovascular:   Bradycardic   Pulmonary/Chest: Effort normal  No respiratory distress  Abdominal: She exhibits no distension  Neurological: She is alert and oriented to person, place, and time  Skin: Skin is warm and dry  Psychiatric: She has a normal mood and affect  Her behavior is normal    Nursing note and vitals reviewed  I have personally reviewed pertinent films in PACS and my interpretation is Right knee osteoarthritis with chondral defect

## 2019-08-27 DIAGNOSIS — M17.11 PRIMARY OSTEOARTHRITIS OF RIGHT KNEE: ICD-10-CM

## 2019-08-29 RX ORDER — MELOXICAM 15 MG/1
TABLET ORAL
Qty: 30 TABLET | Refills: 1 | OUTPATIENT
Start: 2019-08-29

## 2019-09-09 ENCOUNTER — TELEPHONE (OUTPATIENT)
Dept: OBGYN CLINIC | Facility: HOSPITAL | Age: 34
End: 2019-09-09

## 2019-09-09 NOTE — TELEPHONE ENCOUNTER
Medication Name:meloxicam (MOBIC)    Dosage of Med:15 mg tablet [[id number]]    Frequency of Med:Take 1 tablet (15 mg total) by mouth daily  Remaining Medication: 0    Pharmacy and Location:[facility] [address] in [facility] [address] Phone Number: [phone]

## 2019-09-10 DIAGNOSIS — M17.11 PRIMARY OSTEOARTHRITIS OF RIGHT KNEE: ICD-10-CM

## 2019-09-10 RX ORDER — MELOXICAM 15 MG/1
15 TABLET ORAL DAILY
Qty: 30 TABLET | Refills: 1 | Status: SHIPPED | OUTPATIENT
Start: 2019-09-10 | End: 2019-11-14 | Stop reason: SDUPTHER

## 2019-09-11 ENCOUNTER — TRANSCRIBE ORDERS (OUTPATIENT)
Dept: ADMINISTRATIVE | Facility: HOSPITAL | Age: 34
End: 2019-09-11

## 2019-09-11 DIAGNOSIS — Z12.39 BREAST SCREENING: ICD-10-CM

## 2019-09-11 DIAGNOSIS — Z12.39 BREAST SCREENING, UNSPECIFIED: Primary | ICD-10-CM

## 2019-09-24 ENCOUNTER — TELEPHONE (OUTPATIENT)
Dept: OBGYN CLINIC | Facility: CLINIC | Age: 34
End: 2019-09-24

## 2019-09-24 NOTE — TELEPHONE ENCOUNTER
Caller: [facility]  Call Back number: [phone]  Provider: Dr Will Vincent has called to reschedule delivery for injection  [facility] mentioned they can schedule for tomorrow

## 2019-09-24 NOTE — TELEPHONE ENCOUNTER
Dr Lila De Jesus spoke to [facility] who asked that we contact them for delivery of her gel injections  Please call [phone] and call her once completed  [phone]    Thank you

## 2019-09-24 NOTE — TELEPHONE ENCOUNTER
Spoke with patient injections were suppose to be delivery today however [facility] rescheduled it for tomorrow 9/25/190

## 2019-09-27 NOTE — TELEPHONE ENCOUNTER
[facility] left a 9/27 vm as a courtesy call for Rachel Menezes concerning delay of order for [employer]    Please call [phone] thank you

## 2019-10-04 ENCOUNTER — OFFICE VISIT (OUTPATIENT)
Dept: OBGYN CLINIC | Facility: CLINIC | Age: 34
End: 2019-10-04
Payer: COMMERCIAL

## 2019-10-04 VITALS
WEIGHT: 192.3 LBS | SYSTOLIC BLOOD PRESSURE: 129 MMHG | HEART RATE: 80 BPM | RESPIRATION RATE: 16 BRPM | DIASTOLIC BLOOD PRESSURE: 84 MMHG | HEIGHT: 68 IN | BODY MASS INDEX: 29.15 KG/M2

## 2019-10-04 DIAGNOSIS — M23.8X1 CHONDRAL DEFECT OF CONDYLE OF RIGHT FEMUR: Primary | ICD-10-CM

## 2019-10-04 PROCEDURE — 20610 DRAIN/INJ JOINT/BURSA W/O US: CPT | Performed by: FAMILY MEDICINE

## 2019-10-04 RX ORDER — LIDOCAINE HYDROCHLORIDE 10 MG/ML
2 INJECTION, SOLUTION INFILTRATION; PERINEURAL
Status: COMPLETED | OUTPATIENT
Start: 2019-10-04 | End: 2019-10-04

## 2019-10-04 RX ADMIN — LIDOCAINE HYDROCHLORIDE 2 ML: 10 INJECTION, SOLUTION INFILTRATION; PERINEURAL at 11:28

## 2019-10-04 NOTE — PROGRESS NOTES
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  -     Large joint arthrocentesis: R knee      57-year-old active female right knee pain of more than 7 months duration  Discussed with patient physical exam, impression and plan  She has full extension of the knee and flexion to 130°  She agreed to proceed with viscosupplementation so I administered Synvisc-One injection to the right knee without complication  Patient was advised to allow 3-4 weeks before she may notice effects of viscosupplementation  If no improvement in pain we will refer to orthopedic surgeon for further evaluation and recommendation of treatment  Subjective:   Patient ID: Jose Nava is a 37 y o  female  Chief Complaint   Patient presents with    Right Knee - Pain, Follow-up       57-year-old active female with full-thickness chondral defect of the right femur is following up for right knee pain more than 7 months duration  She was last seen 6 weeks ago at which point MRI reviewed her was noted for full-thickness cartilage defect  She denied improvement with prescribed meloxicam and physical therapy and elected to proceed with viscosupplementation  She denies any changes in her symptoms since her last visit  She has pain described as generalized to the knee but worse at the anterior and lateral aspects, achy and sometimes sharp, worse with physical activity, associated with swelling, associated with buckling/instability, and improved with rest         Knee Pain   This is a chronic problem  The current episode started more than 1 month ago  The problem occurs intermittently  The problem has been unchanged  Associated symptoms include arthralgias  Pertinent negatives include no joint swelling, numbness or weakness  The symptoms are aggravated by twisting (Running)  She has tried NSAIDs (Physical therapy) for the symptoms   The treatment provided mild relief  Review of Systems   Musculoskeletal: Positive for arthralgias  Negative for joint swelling  Neurological: Negative for weakness and numbness  Objective:  Vitals:    10/04/19 1140   BP: 129/84   BP Location: Right arm   Patient Position: Sitting   Cuff Size: Large   Pulse: 80   Resp: 16   Weight: 87 2 kg (192 lb 4 8 oz)   Height: 5' 7 5" (1 715 m)     Right Knee Exam     Muscle Strength   The patient has normal right knee strength  Range of Motion   Extension: normal   Flexion: 130     Other   Swelling: none  Effusion: no effusion present          Observations     Right Knee   Negative for effusion  Physical Exam   Constitutional: She is oriented to person, place, and time  She appears well-developed  No distress  HENT:   Head: Normocephalic  Eyes: Conjunctivae are normal    Neck: No tracheal deviation present  Cardiovascular: Normal rate  Pulmonary/Chest: Effort normal  No respiratory distress  Abdominal: She exhibits no distension  Musculoskeletal:        Right knee: She exhibits no effusion  Neurological: She is alert and oriented to person, place, and time  Skin: Skin is warm and dry  Psychiatric: She has a normal mood and affect  Her behavior is normal    Nursing note and vitals reviewed  Large joint arthrocentesis: R knee  Date/Time: 10/4/2019 11:28 AM  Consent given by: patient  Site marked: site marked  Timeout: Immediately prior to procedure a time out was called to verify the correct patient, procedure, equipment, support staff and site/side marked as required   Supporting Documentation  Indications: pain   Procedure Details  Location: knee - R knee  Preparation: Patient was prepped and draped in the usual sterile fashion  Needle gauge: 21G    Ultrasound guidance: no  Approach: anterolateral  Medications administered: 2 mL lidocaine 1 %; 48 mg hylan 48 MG/6ML  Specialty Pharmacy Supplied: received medications from pharmacy  Patient tolerance: patient tolerated the procedure well with no immediate complications  Dressing:  Sterile dressing applied

## 2019-10-17 ENCOUNTER — APPOINTMENT (OUTPATIENT)
Dept: LAB | Facility: CLINIC | Age: 34
End: 2019-10-17
Payer: COMMERCIAL

## 2019-10-17 DIAGNOSIS — E03.9 ACQUIRED HYPOTHYROIDISM: ICD-10-CM

## 2019-10-17 LAB — TSH SERPL DL<=0.05 MIU/L-ACNC: 0.49 UIU/ML (ref 0.36–3.74)

## 2019-10-17 PROCEDURE — 84443 ASSAY THYROID STIM HORMONE: CPT

## 2019-10-17 PROCEDURE — 36415 COLL VENOUS BLD VENIPUNCTURE: CPT

## 2019-10-18 ENCOUNTER — APPOINTMENT (OUTPATIENT)
Dept: LAB | Facility: CLINIC | Age: 34
End: 2019-10-18
Payer: COMMERCIAL

## 2019-10-18 DIAGNOSIS — E03.9 ACQUIRED HYPOTHYROIDISM: ICD-10-CM

## 2019-10-18 DIAGNOSIS — Z13.6 SCREENING FOR CARDIOVASCULAR CONDITION: ICD-10-CM

## 2019-10-18 LAB
ALBUMIN SERPL BCP-MCNC: 4.4 G/DL (ref 3.5–5)
ALP SERPL-CCNC: 50 U/L (ref 46–116)
ALT SERPL W P-5'-P-CCNC: 21 U/L (ref 12–78)
ANION GAP SERPL CALCULATED.3IONS-SCNC: 7 MMOL/L (ref 4–13)
AST SERPL W P-5'-P-CCNC: 15 U/L (ref 5–45)
BASOPHILS # BLD AUTO: 0.03 THOUSANDS/ΜL (ref 0–0.1)
BASOPHILS NFR BLD AUTO: 1 % (ref 0–1)
BILIRUB SERPL-MCNC: 0.5 MG/DL (ref 0.2–1)
BUN SERPL-MCNC: 9 MG/DL (ref 5–25)
CALCIUM SERPL-MCNC: 9.4 MG/DL (ref 8.3–10.1)
CHLORIDE SERPL-SCNC: 109 MMOL/L (ref 100–108)
CHOLEST SERPL-MCNC: 161 MG/DL (ref 50–200)
CO2 SERPL-SCNC: 26 MMOL/L (ref 21–32)
CREAT SERPL-MCNC: 0.76 MG/DL (ref 0.6–1.3)
EOSINOPHIL # BLD AUTO: 0.19 THOUSAND/ΜL (ref 0–0.61)
EOSINOPHIL NFR BLD AUTO: 3 % (ref 0–6)
ERYTHROCYTE [DISTWIDTH] IN BLOOD BY AUTOMATED COUNT: 12.6 % (ref 11.6–15.1)
GFR SERPL CREATININE-BSD FRML MDRD: 101 ML/MIN/1.73SQ M
GLUCOSE P FAST SERPL-MCNC: 86 MG/DL (ref 65–99)
HCT VFR BLD AUTO: 39.3 % (ref 34.8–46.1)
HDLC SERPL-MCNC: 50 MG/DL (ref 40–60)
HGB BLD-MCNC: 12.9 G/DL (ref 11.5–15.4)
IMM GRANULOCYTES # BLD AUTO: 0.01 THOUSAND/UL (ref 0–0.2)
IMM GRANULOCYTES NFR BLD AUTO: 0 % (ref 0–2)
LDLC SERPL CALC-MCNC: 98 MG/DL (ref 0–100)
LYMPHOCYTES # BLD AUTO: 3.18 THOUSANDS/ΜL (ref 0.6–4.47)
LYMPHOCYTES NFR BLD AUTO: 52 % (ref 14–44)
MCH RBC QN AUTO: 28.2 PG (ref 26.8–34.3)
MCHC RBC AUTO-ENTMCNC: 32.8 G/DL (ref 31.4–37.4)
MCV RBC AUTO: 86 FL (ref 82–98)
MONOCYTES # BLD AUTO: 0.48 THOUSAND/ΜL (ref 0.17–1.22)
MONOCYTES NFR BLD AUTO: 8 % (ref 4–12)
NEUTROPHILS # BLD AUTO: 2.18 THOUSANDS/ΜL (ref 1.85–7.62)
NEUTS SEG NFR BLD AUTO: 36 % (ref 43–75)
NONHDLC SERPL-MCNC: 111 MG/DL
NRBC BLD AUTO-RTO: 0 /100 WBCS
PLATELET # BLD AUTO: 209 THOUSANDS/UL (ref 149–390)
PMV BLD AUTO: 11 FL (ref 8.9–12.7)
POTASSIUM SERPL-SCNC: 4.1 MMOL/L (ref 3.5–5.3)
PROT SERPL-MCNC: 7.4 G/DL (ref 6.4–8.2)
RBC # BLD AUTO: 4.58 MILLION/UL (ref 3.81–5.12)
SODIUM SERPL-SCNC: 142 MMOL/L (ref 136–145)
TRIGL SERPL-MCNC: 67 MG/DL
WBC # BLD AUTO: 6.07 THOUSAND/UL (ref 4.31–10.16)

## 2019-10-18 PROCEDURE — 80061 LIPID PANEL: CPT

## 2019-10-18 PROCEDURE — 80053 COMPREHEN METABOLIC PANEL: CPT

## 2019-10-18 PROCEDURE — 36415 COLL VENOUS BLD VENIPUNCTURE: CPT

## 2019-10-18 PROCEDURE — 85025 COMPLETE CBC W/AUTO DIFF WBC: CPT

## 2019-10-21 ENCOUNTER — OFFICE VISIT (OUTPATIENT)
Dept: FAMILY MEDICINE CLINIC | Facility: CLINIC | Age: 34
End: 2019-10-21
Payer: COMMERCIAL

## 2019-10-21 VITALS
HEART RATE: 55 BPM | SYSTOLIC BLOOD PRESSURE: 108 MMHG | BODY MASS INDEX: 27.74 KG/M2 | OXYGEN SATURATION: 99 % | HEIGHT: 68 IN | DIASTOLIC BLOOD PRESSURE: 78 MMHG | WEIGHT: 183 LBS | TEMPERATURE: 97.2 F

## 2019-10-21 DIAGNOSIS — Z00.00 ANNUAL PHYSICAL EXAM: Primary | ICD-10-CM

## 2019-10-21 DIAGNOSIS — Z23 NEED FOR VACCINATION: ICD-10-CM

## 2019-10-21 DIAGNOSIS — E03.9 ACQUIRED HYPOTHYROIDISM: ICD-10-CM

## 2019-10-21 PROCEDURE — 99395 PREV VISIT EST AGE 18-39: CPT | Performed by: FAMILY MEDICINE

## 2019-10-21 NOTE — PROGRESS NOTES
Assessment/Plan:    No problem-specific Assessment & Plan notes found for this encounter  Diagnoses and all orders for this visit:    Annual physical exam    Acquired hypothyroidism  -     TSH, 3rd generation with Free T4 reflex; Future    Need for vaccination  -     influenza vaccine, 7708-9250, quadrivalent, 0 5 mL, preservative-free, for adult and pediatric patients 6 mos+ (AFLURIA, FLUARIX, FLULAVAL, FLUZONE)          PHQ-9 Depression Screening    PHQ-9:    Frequency of the following problems over the past two weeks:       Little interest or pleasure in doing things:  0 - not at all  Feeling down, depressed, or hopeless:  0 - not at all  PHQ-2 Score:  0            Subjective:      Patient ID: Corrine Major is a 37 y o  female  Pt is here for annual physical      The following portions of the patient's history were reviewed and updated as appropriate: allergies, current medications, past family history, past medical history, past social history, past surgical history and problem list     Review of Systems   Constitutional: Negative for chills, fatigue and fever  HENT: Negative  Eyes: Negative  Respiratory: Negative for shortness of breath and wheezing  Cardiovascular: Negative for chest pain and palpitations  Gastrointestinal: Negative for abdominal pain, blood in stool, constipation, diarrhea, nausea and vomiting  Endocrine: Negative  Genitourinary: Negative for difficulty urinating and dysuria  Musculoskeletal: Positive for arthralgias  Negative for myalgias  Skin: Negative  Allergic/Immunologic: Negative  Neurological: Negative for seizures and syncope  Hematological: Negative for adenopathy  Psychiatric/Behavioral: Negative            Objective:    /78   Pulse 55   Temp (!) 97 2 °F (36 2 °C) (Tympanic)   Ht 5' 7 5" (1 715 m)   Wt 83 kg (183 lb)   SpO2 99%   BMI 28 24 kg/m²      Physical Exam   Constitutional: She is oriented to person, place, and time  She appears well-developed and well-nourished  HENT:   Head: Normocephalic and atraumatic  Right Ear: External ear normal    Left Ear: External ear normal    Nose: Nose normal    Mouth/Throat: Oropharynx is clear and moist    Eyes: Pupils are equal, round, and reactive to light  Conjunctivae and EOM are normal    Neck: Normal range of motion  Neck supple  Cardiovascular: Normal rate, regular rhythm and normal heart sounds  No murmur heard  Pulmonary/Chest: Effort normal and breath sounds normal  No respiratory distress  She has no wheezes  She has no rales  Abdominal: Soft  Bowel sounds are normal  She exhibits no distension and no mass  There is no tenderness  There is no rebound and no guarding  Musculoskeletal: Normal range of motion  She exhibits no edema  Lymphadenopathy:     She has no cervical adenopathy  Neurological: She is alert and oriented to person, place, and time  She has normal reflexes  She exhibits normal muscle tone  Skin: Skin is warm and dry  Psychiatric: She has a normal mood and affect  Her behavior is normal  Judgment and thought content normal    Nursing note and vitals reviewed

## 2019-10-24 PROCEDURE — 90471 IMMUNIZATION ADMIN: CPT

## 2019-10-24 PROCEDURE — 90686 IIV4 VACC NO PRSV 0.5 ML IM: CPT

## 2019-11-06 NOTE — TELEPHONE ENCOUNTER
[employer] called in wanting to schedule delivery date for injections   Please advise, thank you       Cb#: [phone]

## 2019-11-11 DIAGNOSIS — M23.8X1 CHONDRAL DEFECT OF CONDYLE OF RIGHT FEMUR: Primary | ICD-10-CM

## 2019-11-13 ENCOUNTER — OFFICE VISIT (OUTPATIENT)
Dept: OBGYN CLINIC | Facility: CLINIC | Age: 34
End: 2019-11-13
Payer: COMMERCIAL

## 2019-11-13 VITALS
DIASTOLIC BLOOD PRESSURE: 62 MMHG | BODY MASS INDEX: 27.28 KG/M2 | HEIGHT: 68 IN | SYSTOLIC BLOOD PRESSURE: 110 MMHG | HEART RATE: 82 BPM | WEIGHT: 180 LBS

## 2019-11-13 DIAGNOSIS — M23.8X1 CHONDRAL DEFECT OF CONDYLE OF RIGHT FEMUR: Primary | ICD-10-CM

## 2019-11-13 DIAGNOSIS — M17.11 PRIMARY OSTEOARTHRITIS OF RIGHT KNEE: ICD-10-CM

## 2019-11-13 PROCEDURE — 99243 OFF/OP CNSLTJ NEW/EST LOW 30: CPT | Performed by: ORTHOPAEDIC SURGERY

## 2019-11-13 NOTE — PROGRESS NOTES
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  Primary osteoarthritis of right knee    Imaging was reviewed with the patient  She was given a variety of options including continuing her current treatment regimen of meloxicam and activities as tolerated  Surgical options were also discussed  Patient could opt for diagnostic arthroscopy with chondral debridement and/or microfracture as indicated in large full-thickness regions  More complex procedures were also discussed including cartilage transplant  Patient would like to discuss options with her   She will contact the office when she makes a decision or has other questions or concerns  Return Will call       _____________________________________________________  CHIEF COMPLAINT:  Chief Complaint   Patient presents with    Right Knee - Pain, Swelling         SUBJECTIVE:  Olga Roca is a 37y o  year old female who presents in consultation of right knee pain and swelling, ordered by Dr Sabrina Do  The patient reports her pain began in February or March without a known injury  She had a gradual onset of pain  She initially saw her PCP who prescribed two course of oral steroids  She reports she had significant side effects from the oral steroids including hair loss and weight gain  She has been previously seen by Dr Sabrina Do who obtained an MRI of the right knee and performed a SynviscOne injection on 10/04/19  She notes 20-30% improvement with this injection  She reports her pain is primarily on the medial aspect of the knee  Her symptoms are worsened with weight bearing activities  She enjoys running a few miles per week and is currently unable to tolerate this  She walks a couple miles per day for exercise but does experience pain  She admits she'll occasionally experience instability when running  Going up and down stairs does sometimes cause an increase in pain   She has been taking Meloxicam as prescribed by Dr Kurt Bingham  She denies numbness and tingling  PAST MEDICAL HISTORY:  Past Medical History:   Diagnosis Date    Abnormal Pap smear of cervix     Anxiety     Disease of thyroid gland     Female infertility     IUI pregnancy    Right knee injury 2019    Varicella     Varicose vein of leg        PAST SURGICAL HISTORY:  Past Surgical History:   Procedure Laterality Date    BUNIONECTOMY Left     COLPOSCOPY      WISDOM TOOTH EXTRACTION         FAMILY HISTORY:  Family History   Problem Relation Age of Onset    Cancer Mother     Breast cancer Mother 28    Breast cancer Maternal Aunt 77       SOCIAL HISTORY:  Social History     Tobacco Use    Smoking status: Never Smoker    Smokeless tobacco: Never Used   Substance Use Topics    Alcohol use: No    Drug use: No       MEDICATIONS:    Current Outpatient Medications:     acetaminophen (TYLENOL) 325 mg tablet, Take 2 tablets by mouth every 4 (four) hours as needed for mild pain, Disp: 30 tablet, Rfl: 0    Cholecalciferol (VITAMIN D-3) 1000 units CAPS, Take 1 capsule by mouth daily  , Disp: , Rfl:     GLUCOSAMINE-CHONDROITIN PO, Take by mouth, Disp: , Rfl:     levothyroxine 200 mcg tablet, Take 1 tablet (200 mcg total) by mouth daily in the early morning, Disp: 30 tablet, Rfl: 5    meloxicam (MOBIC) 15 mg tablet, Take 1 tablet (15 mg total) by mouth daily, Disp: 30 tablet, Rfl: 1    multivitamin (THERAGRAN) TABS, Take 1 tablet by mouth daily, Disp: , Rfl:     ALLERGIES:  No Known Allergies    Review of systems:   Constitutional: Negative for fatigue, fever or loss of apetite  HENT: Negative  Respiratory: Negative for shortness of breath, dyspnea  Cardiovascular: Negative for chest pain/tightness  Gastrointestinal: Negative for abdominal pain, N/V  Endocrine: Negative for cold/heat intolerance, unexplained weight loss/gain  Genitourinary: Negative for flank pain, dysuria, hematuria     Musculoskeletal: Positive as stated in HPI   Skin: Negative for rash  Neurological: Negative for numbness and tingling  Psychiatric/Behavioral: Negative for agitation  _____________________________________________________  PHYSICAL EXAMINATION:    Blood pressure 110/62, pulse 82, height 5' 7 5" (1 715 m), weight 81 6 kg (180 lb), not currently breastfeeding  General: well developed and well nourished, alert, oriented times 3 and appears comfortable  Psychiatric: Normal  HEENT: Benign  Cardiovascular: Regular    Pulmonary: No wheezing or stridor  Abdomen: Soft, Nontender  Skin: No masses, erythema, lacerations, fluctation, ulcerations  Neurovascular: Motor and sensory exams are grossly intact, distal pulses are palpable  Limb is warm and well perfused with good color and capillary refill  MUSCULOSKELETAL EXAMINATION:    Right knee exam:   Skin intact, mild generalized swelling, minimal effusion  Mild tenderness to palpation over the medial joint line and medial patellar facet as well as the medial femoral condyle  No tenderness laterally  Full AROM, patellofemoral crepitus is noted with passive ROM  Meniscal testing is negative  Stable to varus and valgus stress, cruciate ligaments are grossly stable  Patellar grind is negative, normal patellar mobility without pain  The remainder of the lower extremity exam, bilaterally, is benign  _____________________________________________________  STUDIES REVIEWED:  Images were reviewd in PACS:   XRs of the right knee performed 5/1/19 demonstrates mild medial compartment DJD with osteophyte formation  MRI of the right knee performed 8/16/19 shows chondral of lateral femoral condyle, joint effusion, and baker's cyst  There is no evidence of meniscal or cruciate ligament pathology       PROCEDURES:  None      Charles Reeder

## 2019-11-14 ENCOUNTER — TELEPHONE (OUTPATIENT)
Dept: OBGYN CLINIC | Facility: CLINIC | Age: 34
End: 2019-11-14

## 2019-11-14 DIAGNOSIS — M17.11 PRIMARY OSTEOARTHRITIS OF RIGHT KNEE: ICD-10-CM

## 2019-11-14 RX ORDER — MELOXICAM 15 MG/1
15 TABLET ORAL DAILY
Qty: 60 TABLET | Refills: 1 | Status: SHIPPED | OUTPATIENT
Start: 2019-11-14 | End: 2021-05-18 | Stop reason: ALTCHOICE

## 2019-11-14 NOTE — TELEPHONE ENCOUNTER
Pt contacted Call Center requested refill of their medication      Medication Name:meloxicam (MOBIC)  Dosage of Med:15 mg tablet [[id number]]   Frequency of medication Take 1 tablet (15 mg total) by mouth daily  Pharmacy and Location:[facility] [address] [place] [address] Callback Phone Number:[phone]

## 2020-01-13 ENCOUNTER — OFFICE VISIT (OUTPATIENT)
Dept: FAMILY MEDICINE CLINIC | Facility: CLINIC | Age: 35
End: 2020-01-13
Payer: COMMERCIAL

## 2020-01-13 VITALS
HEART RATE: 84 BPM | TEMPERATURE: 97.3 F | DIASTOLIC BLOOD PRESSURE: 74 MMHG | WEIGHT: 184 LBS | OXYGEN SATURATION: 99 % | RESPIRATION RATE: 20 BRPM | SYSTOLIC BLOOD PRESSURE: 110 MMHG | BODY MASS INDEX: 28.88 KG/M2 | HEIGHT: 67 IN

## 2020-01-13 DIAGNOSIS — E66.3 OVERWEIGHT (BMI 25.0-29.9): ICD-10-CM

## 2020-01-13 DIAGNOSIS — J01.00 ACUTE NON-RECURRENT MAXILLARY SINUSITIS: Primary | ICD-10-CM

## 2020-01-13 PROCEDURE — 99213 OFFICE O/P EST LOW 20 MIN: CPT | Performed by: FAMILY MEDICINE

## 2020-01-13 PROCEDURE — 3008F BODY MASS INDEX DOCD: CPT | Performed by: FAMILY MEDICINE

## 2020-01-13 RX ORDER — AZITHROMYCIN 250 MG/1
TABLET, FILM COATED ORAL
Qty: 6 TABLET | Refills: 0 | Status: SHIPPED | OUTPATIENT
Start: 2020-01-13 | End: 2020-01-18

## 2020-01-13 NOTE — PROGRESS NOTES
Assessment/Plan:    No problem-specific Assessment & Plan notes found for this encounter  Diagnoses and all orders for this visit:    Acute non-recurrent maxillary sinusitis  -     azithromycin (ZITHROMAX) 250 mg tablet; Take 2 tablets day 1 and 1 tablet daily the next 4 days    Overweight (BMI 25 0-29  9)          PHQ-9 Depression Screening    PHQ-9:    Frequency of the following problems over the past two weeks:             BMI Counseling: Body mass index is 28 82 kg/m²  The BMI is above normal  Nutrition recommendations include reducing portion sizes and 3-5 servings of fruits/vegetables daily  Exercise recommendations include moderate aerobic physical activity for 150 minutes/week and exercising 3-5 times per week  Subjective:      Patient ID: Jaleesa Griffin is a 37 y o  female  Cough   This is a new problem  The current episode started yesterday  The problem has been unchanged  The cough is productive of purulent sputum  Associated symptoms include a fever, nasal congestion and rhinorrhea  Pertinent negatives include no shortness of breath or wheezing  She has tried nothing for the symptoms  The treatment provided no relief  The following portions of the patient's history were reviewed and updated as appropriate: allergies, current medications, past family history, past medical history, past social history, past surgical history and problem list     Review of Systems   Constitutional: Positive for fever  HENT: Positive for rhinorrhea, sinus pressure and sinus pain  Respiratory: Positive for cough  Negative for shortness of breath and wheezing  Objective:    /74   Pulse 84   Temp (!) 97 3 °F (36 3 °C) (Tympanic)   Resp 20   Ht 5' 7" (1 702 m)   Wt 83 5 kg (184 lb)   SpO2 99%   BMI 28 82 kg/m²      Physical Exam   Constitutional: She is oriented to person, place, and time  She appears well-developed and well-nourished  No distress     HENT:   Head: Normocephalic and atraumatic  Right Ear: Tympanic membrane, external ear and ear canal normal    Left Ear: Tympanic membrane, external ear and ear canal normal    Nose: Mucosal edema and rhinorrhea present  Mouth/Throat: Mucous membranes are normal  No oropharyngeal exudate  [place] OP   Eyes: No scleral icterus  Neck: Normal range of motion  Neck supple  Cardiovascular: Normal rate, regular rhythm and normal heart sounds  No murmur heard  Pulmonary/Chest: Effort normal and breath sounds normal  No stridor  No respiratory distress  She has no wheezes  She has no rales  Lymphadenopathy:     She has no cervical adenopathy  Neurological: She is alert and oriented to person, place, and time  Skin: Skin is warm and dry  No rash noted  She is not diaphoretic  Psychiatric: She has a normal mood and affect  Her behavior is normal  Judgment and thought content normal    Nursing note and vitals reviewed

## 2020-02-10 ENCOUNTER — HOSPITAL ENCOUNTER (OUTPATIENT)
Dept: MAMMOGRAPHY | Facility: MEDICAL CENTER | Age: 35
Discharge: HOME | End: 2020-02-10
Payer: COMMERCIAL

## 2020-02-10 VITALS — BODY MASS INDEX: 29.35 KG/M2 | WEIGHT: 187 LBS | HEIGHT: 67 IN

## 2020-02-10 DIAGNOSIS — Z12.39 BREAST SCREENING: ICD-10-CM

## 2020-02-10 DIAGNOSIS — Z12.31 ENCOUNTER FOR SCREENING MAMMOGRAM FOR MALIGNANT NEOPLASM OF BREAST: ICD-10-CM

## 2020-02-10 PROCEDURE — 77063 BREAST TOMOSYNTHESIS BI: CPT

## 2020-02-10 PROCEDURE — 77067 SCR MAMMO BI INCL CAD: CPT

## 2020-04-16 DIAGNOSIS — E03.9 HYPOTHYROIDISM, UNSPECIFIED TYPE: ICD-10-CM

## 2020-04-16 RX ORDER — LEVOTHYROXINE SODIUM 0.2 MG/1
200 TABLET ORAL
Qty: 90 TABLET | Refills: 2 | Status: SHIPPED | OUTPATIENT
Start: 2020-04-16 | End: 2020-11-18 | Stop reason: SDUPTHER

## 2020-04-16 RX ORDER — LEVOTHYROXINE SODIUM 0.2 MG/1
200 TABLET ORAL
Qty: 30 TABLET | Refills: 5 | Status: SHIPPED | OUTPATIENT
Start: 2020-04-16 | End: 2020-04-16 | Stop reason: SDUPTHER

## 2020-04-17 ENCOUNTER — APPOINTMENT (OUTPATIENT)
Dept: LAB | Facility: CLINIC | Age: 35
End: 2020-04-17
Payer: COMMERCIAL

## 2020-04-17 DIAGNOSIS — E03.9 ACQUIRED HYPOTHYROIDISM: ICD-10-CM

## 2020-04-17 LAB
T4 FREE SERPL-MCNC: 1.11 NG/DL (ref 0.76–1.46)
TSH SERPL DL<=0.05 MIU/L-ACNC: 0.08 UIU/ML (ref 0.36–3.74)

## 2020-04-17 PROCEDURE — 84443 ASSAY THYROID STIM HORMONE: CPT

## 2020-04-17 PROCEDURE — 84439 ASSAY OF FREE THYROXINE: CPT

## 2020-04-17 PROCEDURE — 36415 COLL VENOUS BLD VENIPUNCTURE: CPT

## 2020-04-22 ENCOUNTER — TELEMEDICINE (OUTPATIENT)
Dept: FAMILY MEDICINE CLINIC | Facility: CLINIC | Age: 35
End: 2020-04-22
Payer: COMMERCIAL

## 2020-04-22 DIAGNOSIS — Z13.6 SCREENING FOR CARDIOVASCULAR CONDITION: ICD-10-CM

## 2020-04-22 DIAGNOSIS — E03.9 ACQUIRED HYPOTHYROIDISM: Primary | ICD-10-CM

## 2020-04-22 PROCEDURE — 99213 OFFICE O/P EST LOW 20 MIN: CPT | Performed by: FAMILY MEDICINE

## 2020-09-16 ENCOUNTER — TRANSCRIBE ORDERS (OUTPATIENT)
Dept: ADMINISTRATIVE | Facility: HOSPITAL | Age: 35
End: 2020-09-16

## 2020-09-16 DIAGNOSIS — Z12.31 ENCOUNTER FOR SCREENING MAMMOGRAM FOR MALIGNANT NEOPLASM OF BREAST: Primary | ICD-10-CM

## 2020-09-16 PROCEDURE — G0145 SCR C/V CYTO,THINLAYER,RESCR: HCPCS | Performed by: OBSTETRICS & GYNECOLOGY

## 2020-09-17 ENCOUNTER — LAB REQUISITION (OUTPATIENT)
Dept: LAB | Facility: HOSPITAL | Age: 35
End: 2020-09-17
Payer: COMMERCIAL

## 2020-09-17 DIAGNOSIS — Z01.419 ENCOUNTER FOR GYNECOLOGICAL EXAMINATION (GENERAL) (ROUTINE) WITHOUT ABNORMAL FINDINGS: ICD-10-CM

## 2020-09-18 PROCEDURE — 87624 HPV HI-RISK TYP POOLED RSLT: CPT | Performed by: OBSTETRICS & GYNECOLOGY

## 2020-09-19 LAB
HPV HR 12 DNA CVX QL NAA+PROBE: NEGATIVE
HPV16 DNA CVX QL NAA+PROBE: NEGATIVE
HPV18 DNA CVX QL NAA+PROBE: NEGATIVE

## 2020-09-22 LAB
LAB AP GYN PRIMARY INTERPRETATION: NORMAL
LAB AP LMP: NORMAL
Lab: NORMAL

## 2020-10-20 ENCOUNTER — LAB (OUTPATIENT)
Dept: LAB | Facility: CLINIC | Age: 35
End: 2020-10-20
Payer: COMMERCIAL

## 2020-10-20 DIAGNOSIS — Z13.6 SCREENING FOR CARDIOVASCULAR CONDITION: ICD-10-CM

## 2020-10-20 DIAGNOSIS — E03.9 ACQUIRED HYPOTHYROIDISM: ICD-10-CM

## 2020-10-20 LAB
ALBUMIN SERPL BCP-MCNC: 3.9 G/DL (ref 3.5–5)
ALP SERPL-CCNC: 60 U/L (ref 46–116)
ALT SERPL W P-5'-P-CCNC: 22 U/L (ref 12–78)
ANION GAP SERPL CALCULATED.3IONS-SCNC: 3 MMOL/L (ref 4–13)
AST SERPL W P-5'-P-CCNC: 16 U/L (ref 5–45)
BASOPHILS # BLD AUTO: 0.04 THOUSANDS/ΜL (ref 0–0.1)
BASOPHILS NFR BLD AUTO: 1 % (ref 0–1)
BILIRUB SERPL-MCNC: 0.56 MG/DL (ref 0.2–1)
BUN SERPL-MCNC: 10 MG/DL (ref 5–25)
CALCIUM SERPL-MCNC: 8.8 MG/DL (ref 8.3–10.1)
CHLORIDE SERPL-SCNC: 107 MMOL/L (ref 100–108)
CHOLEST SERPL-MCNC: 169 MG/DL (ref 50–200)
CO2 SERPL-SCNC: 27 MMOL/L (ref 21–32)
CREAT SERPL-MCNC: 0.72 MG/DL (ref 0.6–1.3)
EOSINOPHIL # BLD AUTO: 0.17 THOUSAND/ΜL (ref 0–0.61)
EOSINOPHIL NFR BLD AUTO: 3 % (ref 0–6)
ERYTHROCYTE [DISTWIDTH] IN BLOOD BY AUTOMATED COUNT: 12.8 % (ref 11.6–15.1)
GFR SERPL CREATININE-BSD FRML MDRD: 107 ML/MIN/1.73SQ M
GLUCOSE P FAST SERPL-MCNC: 90 MG/DL (ref 65–99)
HCT VFR BLD AUTO: 38.9 % (ref 34.8–46.1)
HDLC SERPL-MCNC: 54 MG/DL
HGB BLD-MCNC: 13.5 G/DL (ref 11.5–15.4)
IMM GRANULOCYTES # BLD AUTO: 0.01 THOUSAND/UL (ref 0–0.2)
IMM GRANULOCYTES NFR BLD AUTO: 0 % (ref 0–2)
LDLC SERPL CALC-MCNC: 95 MG/DL (ref 0–100)
LYMPHOCYTES # BLD AUTO: 3.35 THOUSANDS/ΜL (ref 0.6–4.47)
LYMPHOCYTES NFR BLD AUTO: 48 % (ref 14–44)
MCH RBC QN AUTO: 29.1 PG (ref 26.8–34.3)
MCHC RBC AUTO-ENTMCNC: 34.7 G/DL (ref 31.4–37.4)
MCV RBC AUTO: 84 FL (ref 82–98)
MONOCYTES # BLD AUTO: 0.5 THOUSAND/ΜL (ref 0.17–1.22)
MONOCYTES NFR BLD AUTO: 7 % (ref 4–12)
NEUTROPHILS # BLD AUTO: 2.84 THOUSANDS/ΜL (ref 1.85–7.62)
NEUTS SEG NFR BLD AUTO: 41 % (ref 43–75)
NONHDLC SERPL-MCNC: 115 MG/DL
NRBC BLD AUTO-RTO: 0 /100 WBCS
PLATELET # BLD AUTO: 231 THOUSANDS/UL (ref 149–390)
PMV BLD AUTO: 10.4 FL (ref 8.9–12.7)
POTASSIUM SERPL-SCNC: 4.3 MMOL/L (ref 3.5–5.3)
PROT SERPL-MCNC: 7.1 G/DL (ref 6.4–8.2)
RBC # BLD AUTO: 4.64 MILLION/UL (ref 3.81–5.12)
SODIUM SERPL-SCNC: 137 MMOL/L (ref 136–145)
T4 FREE SERPL-MCNC: 1.19 NG/DL (ref 0.76–1.46)
TRIGL SERPL-MCNC: 100 MG/DL
TSH SERPL DL<=0.05 MIU/L-ACNC: 0.05 UIU/ML (ref 0.36–3.74)
WBC # BLD AUTO: 6.91 THOUSAND/UL (ref 4.31–10.16)

## 2020-10-20 PROCEDURE — 80061 LIPID PANEL: CPT

## 2020-10-20 PROCEDURE — 85025 COMPLETE CBC W/AUTO DIFF WBC: CPT

## 2020-10-20 PROCEDURE — 36415 COLL VENOUS BLD VENIPUNCTURE: CPT

## 2020-10-20 PROCEDURE — 84439 ASSAY OF FREE THYROXINE: CPT

## 2020-10-20 PROCEDURE — 80053 COMPREHEN METABOLIC PANEL: CPT

## 2020-10-20 PROCEDURE — 84443 ASSAY THYROID STIM HORMONE: CPT

## 2020-10-27 ENCOUNTER — TELEPHONE (OUTPATIENT)
Dept: FAMILY MEDICINE CLINIC | Facility: CLINIC | Age: 35
End: 2020-10-27

## 2020-11-18 ENCOUNTER — OFFICE VISIT (OUTPATIENT)
Dept: FAMILY MEDICINE CLINIC | Facility: CLINIC | Age: 35
End: 2020-11-18
Payer: COMMERCIAL

## 2020-11-18 VITALS
DIASTOLIC BLOOD PRESSURE: 70 MMHG | BODY MASS INDEX: 30.48 KG/M2 | OXYGEN SATURATION: 98 % | WEIGHT: 194.2 LBS | SYSTOLIC BLOOD PRESSURE: 108 MMHG | HEART RATE: 88 BPM | HEIGHT: 67 IN | TEMPERATURE: 97.5 F

## 2020-11-18 DIAGNOSIS — Z00.00 ANNUAL PHYSICAL EXAM: Primary | ICD-10-CM

## 2020-11-18 DIAGNOSIS — E03.9 HYPOTHYROIDISM, UNSPECIFIED TYPE: ICD-10-CM

## 2020-11-18 DIAGNOSIS — E03.9 ACQUIRED HYPOTHYROIDISM: ICD-10-CM

## 2020-11-18 PROCEDURE — 1036F TOBACCO NON-USER: CPT | Performed by: FAMILY MEDICINE

## 2020-11-18 PROCEDURE — 99395 PREV VISIT EST AGE 18-39: CPT | Performed by: FAMILY MEDICINE

## 2020-11-18 PROCEDURE — 3725F SCREEN DEPRESSION PERFORMED: CPT | Performed by: FAMILY MEDICINE

## 2020-11-18 PROCEDURE — 3008F BODY MASS INDEX DOCD: CPT | Performed by: FAMILY MEDICINE

## 2020-11-18 RX ORDER — LEVOTHYROXINE SODIUM 0.2 MG/1
200 TABLET ORAL
Qty: 90 TABLET | Refills: 2 | Status: SHIPPED | OUTPATIENT
Start: 2020-11-18 | End: 2021-07-26 | Stop reason: SDUPTHER

## 2021-02-26 ENCOUNTER — HOSPITAL ENCOUNTER (OUTPATIENT)
Dept: MAMMOGRAPHY | Facility: MEDICAL CENTER | Age: 36
Discharge: HOME | End: 2021-02-26
Payer: COMMERCIAL

## 2021-02-26 VITALS — WEIGHT: 194 LBS | BODY MASS INDEX: 30.45 KG/M2 | HEIGHT: 67 IN

## 2021-02-26 DIAGNOSIS — Z12.31 ENCOUNTER FOR SCREENING MAMMOGRAM FOR MALIGNANT NEOPLASM OF BREAST: ICD-10-CM

## 2021-02-26 PROCEDURE — 77063 BREAST TOMOSYNTHESIS BI: CPT

## 2021-02-26 PROCEDURE — 77067 SCR MAMMO BI INCL CAD: CPT

## 2021-03-26 DIAGNOSIS — Z23 ENCOUNTER FOR IMMUNIZATION: ICD-10-CM

## 2021-05-17 ENCOUNTER — APPOINTMENT (OUTPATIENT)
Dept: LAB | Facility: HOSPITAL | Age: 36
End: 2021-05-17
Payer: COMMERCIAL

## 2021-05-17 DIAGNOSIS — E03.9 ACQUIRED HYPOTHYROIDISM: ICD-10-CM

## 2021-05-17 LAB — TSH SERPL DL<=0.05 MIU/L-ACNC: 2.08 UIU/ML (ref 0.36–3.74)

## 2021-05-17 PROCEDURE — 36415 COLL VENOUS BLD VENIPUNCTURE: CPT

## 2021-05-17 PROCEDURE — 84443 ASSAY THYROID STIM HORMONE: CPT

## 2021-05-19 ENCOUNTER — OFFICE VISIT (OUTPATIENT)
Dept: FAMILY MEDICINE CLINIC | Facility: CLINIC | Age: 36
End: 2021-05-19
Payer: COMMERCIAL

## 2021-05-19 VITALS
TEMPERATURE: 98.4 F | HEART RATE: 82 BPM | WEIGHT: 195.8 LBS | BODY MASS INDEX: 30.73 KG/M2 | SYSTOLIC BLOOD PRESSURE: 118 MMHG | DIASTOLIC BLOOD PRESSURE: 64 MMHG | OXYGEN SATURATION: 99 % | HEIGHT: 67 IN

## 2021-05-19 DIAGNOSIS — E66.09 CLASS 1 OBESITY DUE TO EXCESS CALORIES WITHOUT SERIOUS COMORBIDITY WITH BODY MASS INDEX (BMI) OF 30.0 TO 30.9 IN ADULT: ICD-10-CM

## 2021-05-19 DIAGNOSIS — Z13.31 NEGATIVE DEPRESSION SCREENING: ICD-10-CM

## 2021-05-19 DIAGNOSIS — E03.9 ACQUIRED HYPOTHYROIDISM: Primary | ICD-10-CM

## 2021-05-19 PROBLEM — E66.811 CLASS 1 OBESITY DUE TO EXCESS CALORIES WITHOUT SERIOUS COMORBIDITY WITH BODY MASS INDEX (BMI) OF 30.0 TO 30.9 IN ADULT: Status: ACTIVE | Noted: 2021-05-19

## 2021-05-19 PROCEDURE — 3725F SCREEN DEPRESSION PERFORMED: CPT | Performed by: FAMILY MEDICINE

## 2021-05-19 PROCEDURE — 3008F BODY MASS INDEX DOCD: CPT | Performed by: FAMILY MEDICINE

## 2021-05-19 PROCEDURE — 99213 OFFICE O/P EST LOW 20 MIN: CPT | Performed by: FAMILY MEDICINE

## 2021-05-19 PROCEDURE — 1036F TOBACCO NON-USER: CPT | Performed by: FAMILY MEDICINE

## 2021-05-19 NOTE — PROGRESS NOTES
Assessment/Plan:    No problem-specific Assessment & Plan notes found for this encounter  Diagnoses and all orders for this visit:    Acquired hypothyroidism  -     CBC and differential; Future  -     Comprehensive metabolic panel; Future  -     Lipid panel; Future  -     TSH, 3rd generation with Free T4 reflex; Future    Class 1 obesity due to excess calories without serious comorbidity with body mass index (BMI) of 30 0 to 30 9 in adult    Negative depression screening          PHQ-9 Depression Screening    PHQ-9:   Frequency of the following problems over the past two weeks:      Little interest or pleasure in doing things: 0 - not at all  Feeling down, depressed, or hopeless: 0 - not at all  PHQ-2 Score: 0        BMI Counseling: Body mass index is 30 67 kg/m²  The BMI is above normal  Nutrition recommendations include reducing portion sizes and 3-5 servings of fruits/vegetables daily  Exercise recommendations include moderate aerobic physical activity for 150 minutes/week and exercising 3-5 times per week  Subjective:      Patient ID: Tom Bullard is a 38 y o  female  Thyroid Problem  Presents for follow-up visit  Symptoms include fatigue  Patient reports no cold intolerance, constipation, diarrhea, dry skin, hair loss, heat intolerance or palpitations  The symptoms have been stable  The following portions of the patient's history were reviewed and updated as appropriate: allergies, current medications, past family history, past medical history, past social history, past surgical history and problem list     Review of Systems   Constitutional: Positive for fatigue  Negative for chills and fever  Respiratory: Negative for shortness of breath and wheezing  Cardiovascular: Negative for chest pain and palpitations  Gastrointestinal: Negative for constipation and diarrhea  Endocrine: Negative for cold intolerance and heat intolerance           Objective:    /64 (BP Location: Left arm, Patient Position: Sitting)   Pulse 82   Temp 98 4 °F (36 9 °C) (Tympanic)   Ht 5' 7" (1 702 m)   Wt 88 8 kg (195 lb 12 8 oz)   LMP 04/30/2021   SpO2 99%   BMI 30 67 kg/m²      Physical Exam  Vitals signs and nursing note reviewed  Constitutional:       General: She is not in acute distress  Appearance: Normal appearance  She is not ill-appearing or diaphoretic  HENT:      Head: Normocephalic and atraumatic  Eyes:      Conjunctiva/sclera: Conjunctivae normal    Neck:      Musculoskeletal: Normal range of motion and neck supple  No neck rigidity  Cardiovascular:      Rate and Rhythm: Normal rate and regular rhythm  Heart sounds: Normal heart sounds  No murmur  Pulmonary:      Effort: Pulmonary effort is normal  No respiratory distress  Breath sounds: Normal breath sounds  No wheezing, rhonchi or rales  Abdominal:      General: There is no distension  Palpations: Abdomen is soft  There is no mass  Tenderness: There is no abdominal tenderness  There is no guarding or rebound  Musculoskeletal:      Right lower leg: No edema  Left lower leg: No edema  Lymphadenopathy:      Cervical: No cervical adenopathy  Neurological:      Mental Status: She is alert and oriented to person, place, and time  Psychiatric:         Mood and Affect: Mood normal          Behavior: Behavior normal          Thought Content:  Thought content normal          Judgment: Judgment normal

## 2021-07-26 DIAGNOSIS — E03.9 HYPOTHYROIDISM, UNSPECIFIED TYPE: ICD-10-CM

## 2021-07-26 RX ORDER — LEVOTHYROXINE SODIUM 0.2 MG/1
200 TABLET ORAL
Qty: 90 TABLET | Refills: 2 | Status: SHIPPED | OUTPATIENT
Start: 2021-07-26 | End: 2022-07-16

## 2021-09-07 ENCOUNTER — NURSE TRIAGE (OUTPATIENT)
Dept: OTHER | Facility: OTHER | Age: 36
End: 2021-09-07

## 2021-09-07 DIAGNOSIS — Z20.828 SARS-ASSOCIATED CORONAVIRUS EXPOSURE: Primary | ICD-10-CM

## 2021-09-07 PROCEDURE — U0003 INFECTIOUS AGENT DETECTION BY NUCLEIC ACID (DNA OR RNA); SEVERE ACUTE RESPIRATORY SYNDROME CORONAVIRUS 2 (SARS-COV-2) (CORONAVIRUS DISEASE [COVID-19]), AMPLIFIED PROBE TECHNIQUE, MAKING USE OF HIGH THROUGHPUT TECHNOLOGIES AS DESCRIBED BY CMS-2020-01-R: HCPCS | Performed by: FAMILY MEDICINE

## 2021-09-07 PROCEDURE — U0005 INFEC AGEN DETEC AMPLI PROBE: HCPCS | Performed by: FAMILY MEDICINE

## 2021-09-07 NOTE — TELEPHONE ENCOUNTER
Reason for Disposition   [1] COVID-19 infection suspected by caller or triager AND [2] mild symptoms (cough, fever, or others) AND [3] no complications or SOB    Answer Assessment - Initial Assessment Questions  1  Were you within 6 feet or less, for up to 15 minutes or more with a person that has a confirmed COVID-19 test?   No     2  What was the date of your exposure? Unknown     3  Are you experiencing any symptoms attributed to the virus?  (Assess for SOB, cough, fever, difficulty breathing)   Fatigue, soreness, and congestion     4  HIGH RISK: Do you have any history heart or lung conditions, weakened immune system, diabetes, Asthma, CHF, HIV, COPD, Chemo, renal failure, sickle cell, etc?   No     5  PREGNANCY: Are you pregnant or did you recently give birth?    No    Protocols used: CORONAVIRUS (COVID-19) DIAGNOSED OR SUSPECTED-ADULT-

## 2021-09-07 NOTE — TELEPHONE ENCOUNTER
Regarding: Covid symptoms-2 of 2 with  Jorge Postal  ----- Message from Emmett Zapien RN sent at 9/7/2021  5:37 PM EDT -----  Covid test-symptoms fatigue, soreness, congestion

## 2021-11-29 ENCOUNTER — APPOINTMENT (OUTPATIENT)
Dept: LAB | Facility: CLINIC | Age: 36
End: 2021-11-29
Payer: COMMERCIAL

## 2021-11-29 DIAGNOSIS — E03.9 ACQUIRED HYPOTHYROIDISM: ICD-10-CM

## 2021-11-29 LAB
ALBUMIN SERPL BCP-MCNC: 3.5 G/DL (ref 3.5–5)
ALP SERPL-CCNC: 58 U/L (ref 46–116)
ALT SERPL W P-5'-P-CCNC: 22 U/L (ref 12–78)
ANION GAP SERPL CALCULATED.3IONS-SCNC: 4 MMOL/L (ref 4–13)
AST SERPL W P-5'-P-CCNC: 16 U/L (ref 5–45)
BASOPHILS # BLD AUTO: 0.06 THOUSANDS/ΜL (ref 0–0.1)
BASOPHILS NFR BLD AUTO: 1 % (ref 0–1)
BILIRUB SERPL-MCNC: 0.71 MG/DL (ref 0.2–1)
BUN SERPL-MCNC: 10 MG/DL (ref 5–25)
CALCIUM SERPL-MCNC: 9.6 MG/DL (ref 8.3–10.1)
CHLORIDE SERPL-SCNC: 108 MMOL/L (ref 100–108)
CHOLEST SERPL-MCNC: 165 MG/DL
CO2 SERPL-SCNC: 25 MMOL/L (ref 21–32)
CREAT SERPL-MCNC: 0.73 MG/DL (ref 0.6–1.3)
EOSINOPHIL # BLD AUTO: 0.27 THOUSAND/ΜL (ref 0–0.61)
EOSINOPHIL NFR BLD AUTO: 3 % (ref 0–6)
ERYTHROCYTE [DISTWIDTH] IN BLOOD BY AUTOMATED COUNT: 12.9 % (ref 11.6–15.1)
GFR SERPL CREATININE-BSD FRML MDRD: 105 ML/MIN/1.73SQ M
GLUCOSE P FAST SERPL-MCNC: 88 MG/DL (ref 65–99)
HCT VFR BLD AUTO: 38.2 % (ref 34.8–46.1)
HDLC SERPL-MCNC: 49 MG/DL
HGB BLD-MCNC: 12.9 G/DL (ref 11.5–15.4)
IMM GRANULOCYTES # BLD AUTO: 0.02 THOUSAND/UL (ref 0–0.2)
IMM GRANULOCYTES NFR BLD AUTO: 0 % (ref 0–2)
LDLC SERPL CALC-MCNC: 97 MG/DL (ref 0–100)
LYMPHOCYTES # BLD AUTO: 3.86 THOUSANDS/ΜL (ref 0.6–4.47)
LYMPHOCYTES NFR BLD AUTO: 46 % (ref 14–44)
MCH RBC QN AUTO: 28.1 PG (ref 26.8–34.3)
MCHC RBC AUTO-ENTMCNC: 33.8 G/DL (ref 31.4–37.4)
MCV RBC AUTO: 83 FL (ref 82–98)
MONOCYTES # BLD AUTO: 0.63 THOUSAND/ΜL (ref 0.17–1.22)
MONOCYTES NFR BLD AUTO: 8 % (ref 4–12)
NEUTROPHILS # BLD AUTO: 3.45 THOUSANDS/ΜL (ref 1.85–7.62)
NEUTS SEG NFR BLD AUTO: 42 % (ref 43–75)
NONHDLC SERPL-MCNC: 116 MG/DL
NRBC BLD AUTO-RTO: 0 /100 WBCS
PLATELET # BLD AUTO: 248 THOUSANDS/UL (ref 149–390)
PMV BLD AUTO: 10.5 FL (ref 8.9–12.7)
POTASSIUM SERPL-SCNC: 4.2 MMOL/L (ref 3.5–5.3)
PROT SERPL-MCNC: 7 G/DL (ref 6.4–8.2)
RBC # BLD AUTO: 4.59 MILLION/UL (ref 3.81–5.12)
SODIUM SERPL-SCNC: 137 MMOL/L (ref 136–145)
TRIGL SERPL-MCNC: 95 MG/DL
TSH SERPL DL<=0.05 MIU/L-ACNC: 0.52 UIU/ML (ref 0.36–3.74)
WBC # BLD AUTO: 8.29 THOUSAND/UL (ref 4.31–10.16)

## 2021-11-29 PROCEDURE — 36415 COLL VENOUS BLD VENIPUNCTURE: CPT

## 2021-11-29 PROCEDURE — 80053 COMPREHEN METABOLIC PANEL: CPT

## 2021-11-29 PROCEDURE — 80061 LIPID PANEL: CPT

## 2021-11-29 PROCEDURE — 85025 COMPLETE CBC W/AUTO DIFF WBC: CPT

## 2021-11-29 PROCEDURE — 84443 ASSAY THYROID STIM HORMONE: CPT

## 2021-12-02 ENCOUNTER — OFFICE VISIT (OUTPATIENT)
Dept: FAMILY MEDICINE CLINIC | Facility: CLINIC | Age: 36
End: 2021-12-02
Payer: COMMERCIAL

## 2021-12-02 VITALS
WEIGHT: 199 LBS | RESPIRATION RATE: 18 BRPM | HEART RATE: 74 BPM | SYSTOLIC BLOOD PRESSURE: 102 MMHG | HEIGHT: 67 IN | BODY MASS INDEX: 31.23 KG/M2 | TEMPERATURE: 98.5 F | DIASTOLIC BLOOD PRESSURE: 62 MMHG | OXYGEN SATURATION: 98 %

## 2021-12-02 DIAGNOSIS — Z00.00 ANNUAL PHYSICAL EXAM: Primary | ICD-10-CM

## 2021-12-02 DIAGNOSIS — Z11.59 NEED FOR HEPATITIS C SCREENING TEST: ICD-10-CM

## 2021-12-02 DIAGNOSIS — E03.9 ACQUIRED HYPOTHYROIDISM: ICD-10-CM

## 2021-12-02 DIAGNOSIS — Z23 ENCOUNTER FOR IMMUNIZATION: ICD-10-CM

## 2021-12-02 PROCEDURE — 99395 PREV VISIT EST AGE 18-39: CPT | Performed by: FAMILY MEDICINE

## 2021-12-02 PROCEDURE — 1036F TOBACCO NON-USER: CPT | Performed by: FAMILY MEDICINE

## 2021-12-02 PROCEDURE — 3008F BODY MASS INDEX DOCD: CPT | Performed by: FAMILY MEDICINE

## 2022-02-28 ENCOUNTER — HOSPITAL ENCOUNTER (OUTPATIENT)
Dept: MAMMOGRAPHY | Facility: MEDICAL CENTER | Age: 37
Discharge: HOME | End: 2022-02-28
Payer: COMMERCIAL

## 2022-02-28 VITALS — HEIGHT: 67 IN | WEIGHT: 199.08 LBS | BODY MASS INDEX: 31.25 KG/M2

## 2022-02-28 DIAGNOSIS — Z91.89 OTHER SPECIFIED PERSONAL RISK FACTORS, NOT ELSEWHERE CLASSIFIED: ICD-10-CM

## 2022-02-28 DIAGNOSIS — Z12.31 ENCOUNTER FOR SCREENING MAMMOGRAM FOR MALIGNANT NEOPLASM OF BREAST: ICD-10-CM

## 2022-02-28 PROCEDURE — 77067 SCR MAMMO BI INCL CAD: CPT

## 2022-02-28 PROCEDURE — 77063 BREAST TOMOSYNTHESIS BI: CPT

## 2022-05-12 ENCOUNTER — OFFICE VISIT (OUTPATIENT)
Dept: FAMILY MEDICINE CLINIC | Facility: CLINIC | Age: 37
End: 2022-05-12
Payer: COMMERCIAL

## 2022-05-12 VITALS
HEART RATE: 120 BPM | BODY MASS INDEX: 30.76 KG/M2 | SYSTOLIC BLOOD PRESSURE: 102 MMHG | WEIGHT: 196 LBS | TEMPERATURE: 99.7 F | HEIGHT: 67 IN | DIASTOLIC BLOOD PRESSURE: 62 MMHG | OXYGEN SATURATION: 99 %

## 2022-05-12 DIAGNOSIS — J06.9 VIRAL URI WITH COUGH: Primary | ICD-10-CM

## 2022-05-12 PROCEDURE — 1036F TOBACCO NON-USER: CPT | Performed by: FAMILY MEDICINE

## 2022-05-12 PROCEDURE — 99213 OFFICE O/P EST LOW 20 MIN: CPT | Performed by: FAMILY MEDICINE

## 2022-05-12 PROCEDURE — 87636 SARSCOV2 & INF A&B AMP PRB: CPT | Performed by: FAMILY MEDICINE

## 2022-05-12 PROCEDURE — 3008F BODY MASS INDEX DOCD: CPT | Performed by: FAMILY MEDICINE

## 2022-05-12 PROCEDURE — 3725F SCREEN DEPRESSION PERFORMED: CPT | Performed by: FAMILY MEDICINE

## 2022-05-12 NOTE — PROGRESS NOTES
Assessment/Plan:      Diagnoses and all orders for this visit:    Viral URI with cough  -     Covid/Flu- Office Collect      Symptomatic treatment advised  chloraseptic spray, flonase, claritin    Return for Next scheduled follow up with pcp  The following portions of the patient's history were reviewed and updated as appropriate: allergies, current medications, past family history, past medical history, past social history, past surgical history, and problem list      Subjective:     Patient ID: Courtney Velasquez is a 43 y o  female  HPI    Reports started with cold symptoms on Monday but this morning woke up with her left eye crusted shut  She denies blurry vision, eye pain, discharge throughout the day  She reports low grade fever  She has not been hydrating well as her son has PNA and is constantly vomiting and her  has cold symptoms as well  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  She reports using over the counter antihistamine claritin D which has helped with the congestion and runny nose  She has not tried anything else other than motrin for the aches  At home covid test on Tuesday was negative  Had flu shot at [facility] pediatrician  covid x 3  Wants a flu test          PHQ-9 Depression Screening    Little interest or pleasure in doing things: 0 - not at all  Feeling down, depressed, or hopeless: 0 - not at all          Current Outpatient Medications on File Prior to Visit   Medication Sig Dispense Refill    acetaminophen (TYLENOL) 325 mg tablet Take 2 tablets by mouth every 4 (four) hours as needed for mild pain 30 tablet 0    Cholecalciferol (VITAMIN D-3) 1000 units CAPS Take 1 capsule by mouth daily        levothyroxine 200 mcg tablet Take 1 tablet (200 mcg total) by mouth daily in the early morning 90 tablet 2    multivitamin (THERAGRAN) TABS Take 1 tablet by mouth daily       No current facility-administered medications on file prior to visit  Review of Systems      Objective:    Vitals:    05/12/22 1302   BP: 102/62   BP Location: Left arm   Patient Position: Sitting   Cuff Size: Large   Pulse: (!) 120   Temp: 99 7 °F (37 6 °C)   TempSrc: Tympanic   SpO2: 99%   Weight: 88 9 kg (196 lb)   Height: 5' 7" (1 702 m)         Physical Exam  Vitals and nursing note reviewed  Constitutional:       General: She is not in acute distress  Appearance: Normal appearance  She is not ill-appearing or toxic-appearing  HENT:      Head: Normocephalic and atraumatic  Right Ear: Hearing, ear canal and external ear normal  A middle ear effusion is present  Left Ear: Hearing, ear canal and external ear normal  A middle ear effusion is present  Nose: Congestion present  Right Turbinates: Swollen  Left Turbinates: Swollen  Right Sinus: No maxillary sinus tenderness or frontal sinus tenderness  Left Sinus: No maxillary sinus tenderness or frontal sinus tenderness  Mouth/Throat:      Mouth: Mucous membranes are moist       Pharynx: Oropharynx is clear  Posterior oropharyngeal erythema present  No oropharyngeal exudate  Tonsils: No tonsillar exudate or tonsillar abscesses  Eyes:      General: Lids are normal  No scleral icterus  Right eye: No discharge  Left eye: No discharge  Extraocular Movements: Extraocular movements intact  Conjunctiva/sclera:      Right eye: Right conjunctiva is not injected  Left eye: Left conjunctiva is injected  No exudate  Pupils: Pupils are equal, round, and reactive to light  Cardiovascular:      Rate and Rhythm: Normal rate and regular rhythm  Heart sounds: Normal heart sounds  No murmur heard  No friction rub  No gallop  Pulmonary:      Effort: Pulmonary effort is normal  No respiratory distress  Breath sounds: Normal breath sounds  No wheezing or rales  Lymphadenopathy:      Cervical: No cervical adenopathy        Right cervical: No superficial or deep cervical adenopathy  Left cervical: No superficial or deep cervical adenopathy  Neurological:      Mental Status: She is alert

## 2022-05-14 LAB
FLUAV RNA RESP QL NAA+PROBE: NEGATIVE
FLUBV RNA RESP QL NAA+PROBE: NEGATIVE
SARS-COV-2 RNA RESP QL NAA+PROBE: NEGATIVE

## 2022-06-01 ENCOUNTER — APPOINTMENT (OUTPATIENT)
Dept: LAB | Facility: HOSPITAL | Age: 37
End: 2022-06-01
Payer: COMMERCIAL

## 2022-06-01 DIAGNOSIS — Z11.59 NEED FOR HEPATITIS C SCREENING TEST: ICD-10-CM

## 2022-06-01 DIAGNOSIS — E03.9 ACQUIRED HYPOTHYROIDISM: ICD-10-CM

## 2022-06-01 LAB — TSH SERPL DL<=0.05 MIU/L-ACNC: 3.66 UIU/ML (ref 0.45–4.5)

## 2022-06-01 PROCEDURE — 86803 HEPATITIS C AB TEST: CPT

## 2022-06-01 PROCEDURE — 36415 COLL VENOUS BLD VENIPUNCTURE: CPT

## 2022-06-01 PROCEDURE — 84443 ASSAY THYROID STIM HORMONE: CPT

## 2022-06-02 ENCOUNTER — OFFICE VISIT (OUTPATIENT)
Dept: FAMILY MEDICINE CLINIC | Facility: CLINIC | Age: 37
End: 2022-06-02
Payer: COMMERCIAL

## 2022-06-02 VITALS
WEIGHT: 196.38 LBS | TEMPERATURE: 98 F | SYSTOLIC BLOOD PRESSURE: 124 MMHG | HEIGHT: 67 IN | BODY MASS INDEX: 30.82 KG/M2 | DIASTOLIC BLOOD PRESSURE: 76 MMHG | OXYGEN SATURATION: 98 % | HEART RATE: 77 BPM

## 2022-06-02 DIAGNOSIS — E03.9 ACQUIRED HYPOTHYROIDISM: Primary | ICD-10-CM

## 2022-06-02 DIAGNOSIS — Z13.6 SCREENING FOR CARDIOVASCULAR CONDITION: ICD-10-CM

## 2022-06-02 LAB — HCV AB SER QL: NORMAL

## 2022-06-02 PROCEDURE — 99213 OFFICE O/P EST LOW 20 MIN: CPT | Performed by: FAMILY MEDICINE

## 2022-06-02 PROCEDURE — 3008F BODY MASS INDEX DOCD: CPT | Performed by: FAMILY MEDICINE

## 2022-06-02 PROCEDURE — 1036F TOBACCO NON-USER: CPT | Performed by: FAMILY MEDICINE

## 2022-06-02 PROCEDURE — 3725F SCREEN DEPRESSION PERFORMED: CPT | Performed by: FAMILY MEDICINE

## 2022-06-02 NOTE — PROGRESS NOTES
Assessment/Plan:    No problem-specific Assessment & Plan notes found for this encounter  Diagnoses and all orders for this visit:    Acquired hypothyroidism  -     CBC and differential; Future  -     Comprehensive metabolic panel; Future  -     Lipid panel; Future  -     TSH, 3rd generation with Free T4 reflex; Future    Screening for cardiovascular condition          PHQ-2/9 Depression Screening    Little interest or pleasure in doing things: 0 - not at all  Feeling down, depressed, or hopeless: 0 - not at all  PHQ-2 Score: 0  PHQ-2 Interpretation: Negative depression screen            Subjective:      Patient ID: Alondra Montoya is a 43 y o  female  Thyroid Problem  Presents for follow-up visit  Patient reports no cold intolerance, constipation, diarrhea, dry skin, fatigue, hair loss, heat intolerance, palpitations, weight gain or weight loss  The following portions of the patient's history were reviewed and updated as appropriate: allergies, current medications, past family history, past medical history, past social history, past surgical history and problem list     Review of Systems   Constitutional: Negative for chills, fatigue, fever, weight gain and weight loss  Cardiovascular: Negative for chest pain and palpitations  Gastrointestinal: Negative for constipation and diarrhea  Endocrine: Negative for cold intolerance and heat intolerance  Objective:    /76 (BP Location: Left arm, Patient Position: Sitting)   Pulse 77   Temp 98 °F (36 7 °C)   Ht 5' 7" (1 702 m)   Wt 89 1 kg (196 lb 6 oz)   SpO2 98%   BMI 30 76 kg/m²      Physical Exam  Vitals and nursing note reviewed  Constitutional:       General: She is not in acute distress  Appearance: Normal appearance  She is not ill-appearing, toxic-appearing or diaphoretic  HENT:      Head: Normocephalic and atraumatic     Eyes:      Conjunctiva/sclera: Conjunctivae normal    Cardiovascular:      Rate and Rhythm: Normal rate and regular rhythm  Heart sounds: Normal heart sounds  No murmur heard  Pulmonary:      Effort: Pulmonary effort is normal  No respiratory distress  Breath sounds: Normal breath sounds  No wheezing, rhonchi or rales  Abdominal:      General: There is no distension  Palpations: Abdomen is soft  There is no mass  Tenderness: There is no abdominal tenderness  There is no guarding or rebound  Musculoskeletal:      Cervical back: Normal range of motion and neck supple  No rigidity  Right lower leg: No edema  Left lower leg: No edema  Lymphadenopathy:      Cervical: No cervical adenopathy  Neurological:      Mental Status: She is alert and oriented to person, place, and time  Psychiatric:         Mood and Affect: Mood normal          Behavior: Behavior normal          Thought Content:  Thought content normal          Judgment: Judgment normal

## 2022-07-16 DIAGNOSIS — E03.9 HYPOTHYROIDISM, UNSPECIFIED TYPE: ICD-10-CM

## 2022-07-16 RX ORDER — LEVOTHYROXINE SODIUM 0.2 MG/1
TABLET ORAL
Qty: 90 TABLET | Refills: 3 | Status: SHIPPED | OUTPATIENT
Start: 2022-07-16

## 2022-09-15 ENCOUNTER — HOSPITAL ENCOUNTER (OUTPATIENT)
Dept: MRI IMAGING | Facility: HOSPITAL | Age: 37
End: 2022-09-15
Attending: OBSTETRICS & GYNECOLOGY
Payer: COMMERCIAL

## 2022-09-15 VITALS — BODY MASS INDEX: 30.83 KG/M2 | WEIGHT: 196.43 LBS | HEIGHT: 67 IN

## 2022-09-15 DIAGNOSIS — Z91.89 OTHER SPECIFIED PERSONAL RISK FACTORS, NOT ELSEWHERE CLASSIFIED: ICD-10-CM

## 2022-09-15 PROCEDURE — C8937 CAD BREAST MRI: HCPCS

## 2022-09-15 PROCEDURE — A9585 GADOBUTROL INJECTION: HCPCS | Performed by: OBSTETRICS & GYNECOLOGY

## 2022-09-15 PROCEDURE — C8908 MRI W/O FOL W/CONT, BREAST,: HCPCS

## 2022-09-15 RX ADMIN — GADOBUTROL 9 ML: 604.72 INJECTION INTRAVENOUS at 15:01

## 2022-10-24 NOTE — PLAN OF CARE
POSTPARTUM     Experiences normal postpartum course Progressing     Appropriate maternal -  bonding Progressing     Establishment of infant feeding pattern Progressing     Incision(s), wounds(s) or drain site(s) healing without S/S of infection Progressing Cpt Desired:  Koh Procedure Text (Tissue Harvesting Technique): A 15-blade scalpel was used to scrape the skin. The skin scrapings were placed on a glass slide, covered with a coverslip and a KOH solution was applied. Koh Intro Text (From The.....): A KOH prep was ordered and evaluated from the Detail Level: Detailed Showing: budding yeast and branching hyphae

## 2022-11-23 ENCOUNTER — APPOINTMENT (OUTPATIENT)
Dept: LAB | Facility: CLINIC | Age: 37
End: 2022-11-23

## 2022-11-23 DIAGNOSIS — E03.9 ACQUIRED HYPOTHYROIDISM: ICD-10-CM

## 2022-11-23 LAB
ALBUMIN SERPL BCP-MCNC: 3.9 G/DL (ref 3.5–5)
ALP SERPL-CCNC: 66 U/L (ref 46–116)
ALT SERPL W P-5'-P-CCNC: 21 U/L (ref 12–78)
ANION GAP SERPL CALCULATED.3IONS-SCNC: 5 MMOL/L (ref 4–13)
AST SERPL W P-5'-P-CCNC: 17 U/L (ref 5–45)
BASOPHILS # BLD AUTO: 0.07 THOUSANDS/ÂΜL (ref 0–0.1)
BASOPHILS NFR BLD AUTO: 1 % (ref 0–1)
BILIRUB SERPL-MCNC: 0.61 MG/DL (ref 0.2–1)
BUN SERPL-MCNC: 7 MG/DL (ref 5–25)
CALCIUM SERPL-MCNC: 9.4 MG/DL (ref 8.3–10.1)
CHLORIDE SERPL-SCNC: 108 MMOL/L (ref 96–108)
CHOLEST SERPL-MCNC: 182 MG/DL
CO2 SERPL-SCNC: 26 MMOL/L (ref 21–32)
CREAT SERPL-MCNC: 0.81 MG/DL (ref 0.6–1.3)
EOSINOPHIL # BLD AUTO: 0.18 THOUSAND/ÂΜL (ref 0–0.61)
EOSINOPHIL NFR BLD AUTO: 2 % (ref 0–6)
ERYTHROCYTE [DISTWIDTH] IN BLOOD BY AUTOMATED COUNT: 13.3 % (ref 11.6–15.1)
GFR SERPL CREATININE-BSD FRML MDRD: 91 ML/MIN/1.73SQ M
GLUCOSE P FAST SERPL-MCNC: 95 MG/DL (ref 65–99)
HCT VFR BLD AUTO: 39.1 % (ref 34.8–46.1)
HDLC SERPL-MCNC: 55 MG/DL
HGB BLD-MCNC: 12.9 G/DL (ref 11.5–15.4)
IMM GRANULOCYTES # BLD AUTO: 0.02 THOUSAND/UL (ref 0–0.2)
IMM GRANULOCYTES NFR BLD AUTO: 0 % (ref 0–2)
LDLC SERPL CALC-MCNC: 113 MG/DL (ref 0–100)
LYMPHOCYTES # BLD AUTO: 3.52 THOUSANDS/ÂΜL (ref 0.6–4.47)
LYMPHOCYTES NFR BLD AUTO: 48 % (ref 14–44)
MCH RBC QN AUTO: 28 PG (ref 26.8–34.3)
MCHC RBC AUTO-ENTMCNC: 33 G/DL (ref 31.4–37.4)
MCV RBC AUTO: 85 FL (ref 82–98)
MONOCYTES # BLD AUTO: 0.6 THOUSAND/ÂΜL (ref 0.17–1.22)
MONOCYTES NFR BLD AUTO: 8 % (ref 4–12)
NEUTROPHILS # BLD AUTO: 3.1 THOUSANDS/ÂΜL (ref 1.85–7.62)
NEUTS SEG NFR BLD AUTO: 41 % (ref 43–75)
NONHDLC SERPL-MCNC: 127 MG/DL
NRBC BLD AUTO-RTO: 0 /100 WBCS
PLATELET # BLD AUTO: 261 THOUSANDS/UL (ref 149–390)
PMV BLD AUTO: 10.4 FL (ref 8.9–12.7)
POTASSIUM SERPL-SCNC: 4.1 MMOL/L (ref 3.5–5.3)
PROT SERPL-MCNC: 7.3 G/DL (ref 6.4–8.4)
RBC # BLD AUTO: 4.61 MILLION/UL (ref 3.81–5.12)
SODIUM SERPL-SCNC: 139 MMOL/L (ref 135–147)
T4 FREE SERPL-MCNC: 0.76 NG/DL (ref 0.76–1.46)
TRIGL SERPL-MCNC: 68 MG/DL
TSH SERPL DL<=0.05 MIU/L-ACNC: 8.29 UIU/ML (ref 0.45–4.5)
WBC # BLD AUTO: 7.49 THOUSAND/UL (ref 4.31–10.16)

## 2022-12-01 ENCOUNTER — RA CDI HCC (OUTPATIENT)
Dept: OTHER | Facility: HOSPITAL | Age: 37
End: 2022-12-01

## 2022-12-08 ENCOUNTER — OFFICE VISIT (OUTPATIENT)
Dept: FAMILY MEDICINE CLINIC | Facility: CLINIC | Age: 37
End: 2022-12-08

## 2022-12-08 VITALS
TEMPERATURE: 98.1 F | HEIGHT: 67 IN | OXYGEN SATURATION: 100 % | SYSTOLIC BLOOD PRESSURE: 126 MMHG | DIASTOLIC BLOOD PRESSURE: 70 MMHG | BODY MASS INDEX: 32.33 KG/M2 | WEIGHT: 206 LBS | HEART RATE: 81 BPM

## 2022-12-08 DIAGNOSIS — E66.09 CLASS 1 OBESITY DUE TO EXCESS CALORIES WITH SERIOUS COMORBIDITY AND BODY MASS INDEX (BMI) OF 32.0 TO 32.9 IN ADULT: ICD-10-CM

## 2022-12-08 DIAGNOSIS — E78.00 HYPERCHOLESTEROLEMIA: ICD-10-CM

## 2022-12-08 DIAGNOSIS — E03.9 ACQUIRED HYPOTHYROIDISM: ICD-10-CM

## 2022-12-08 DIAGNOSIS — Z00.00 ANNUAL PHYSICAL EXAM: Primary | ICD-10-CM

## 2022-12-08 NOTE — PROGRESS NOTES
Assessment/Plan:    No problem-specific Assessment & Plan notes found for this encounter  Diagnoses and all orders for this visit:    Annual physical exam    Hypercholesterolemia  Comments:  pt counseled on diet and exercise    Acquired hypothyroidism  Comments:  no change in the medication  Orders:  -     TSH, 3rd generation with Free T4 reflex; Future    Class 1 obesity due to excess calories with serious comorbidity and body mass index (BMI) of 32 0 to 32 9 in adult  Comments:  pt counseled on diet and exercise          PHQ-2/9 Depression Screening    Little interest or pleasure in doing things: 0 - not at all  Feeling down, depressed, or hopeless: 0 - not at all  PHQ-2 Score: 0  PHQ-2 Interpretation: Negative depression screen            Subjective:      Patient ID: Tom Bullard is a 42 y o  female  Pt here for annual physical  Hyperlipidemia  This is a chronic problem  The current episode started more than 1 year ago  Factors aggravating her hyperlipidemia include fatty foods  Pertinent negatives include no chest pain, myalgias or shortness of breath  The following portions of the patient's history were reviewed and updated as appropriate: allergies, current medications, past family history, past medical history, past social history, past surgical history and problem list     Review of Systems   Constitutional: Negative for chills, fatigue and fever  HENT: Negative  Negative for hearing loss  Eyes: Negative  Negative for visual disturbance  Respiratory: Negative for shortness of breath and wheezing  Cardiovascular: Negative for chest pain and palpitations  Gastrointestinal: Negative for abdominal pain, blood in stool, constipation, diarrhea, nausea and vomiting  Endocrine: Negative  Genitourinary: Negative for difficulty urinating and dysuria  Musculoskeletal: Positive for arthralgias  Negative for myalgias  Skin: Negative  Allergic/Immunologic: Negative  Neurological: Negative for seizures and syncope  Hematological: Negative for adenopathy  Psychiatric/Behavioral: Negative  Objective:    /70   Pulse 81   Temp 98 1 °F (36 7 °C)   Ht 5' 7" (1 702 m)   Wt 93 4 kg (206 lb)   SpO2 100%   BMI 32 26 kg/m²      Physical Exam  Vitals and nursing note reviewed  Constitutional:       General: She is not in acute distress  Appearance: Normal appearance  She is well-developed  She is not ill-appearing, toxic-appearing or diaphoretic  HENT:      Head: Normocephalic and atraumatic  Right Ear: Tympanic membrane, ear canal and external ear normal  There is no impacted cerumen  Left Ear: Tympanic membrane, ear canal and external ear normal  There is no impacted cerumen  Nose: Nose normal  No congestion or rhinorrhea  Mouth/Throat:      Mouth: Mucous membranes are moist       Pharynx: Oropharynx is clear  No oropharyngeal exudate or posterior oropharyngeal erythema  Eyes:      General: No scleral icterus  Right eye: No discharge  Left eye: No discharge  Conjunctiva/sclera: Conjunctivae normal       Pupils: Pupils are equal, round, and reactive to light  Cardiovascular:      Rate and Rhythm: Normal rate and regular rhythm  Pulses: Normal pulses  Heart sounds: Normal heart sounds  No murmur heard  Pulmonary:      Effort: Pulmonary effort is normal  No respiratory distress  Breath sounds: Normal breath sounds  No wheezing, rhonchi or rales  Abdominal:      General: Bowel sounds are normal  There is no distension  Palpations: Abdomen is soft  There is no mass  Tenderness: There is no abdominal tenderness  There is no guarding or rebound  Musculoskeletal:         General: Normal range of motion  Cervical back: Normal range of motion and neck supple  No rigidity  Right lower leg: No edema  Left lower leg: No edema     Lymphadenopathy:      Cervical: No cervical adenopathy  Skin:     General: Skin is warm and dry  Findings: No rash  Neurological:      General: No focal deficit present  Mental Status: She is alert and oriented to person, place, and time  Sensory: No sensory deficit  Motor: No weakness or abnormal muscle tone  Coordination: Coordination normal       Gait: Gait normal       Deep Tendon Reflexes: Reflexes are normal and symmetric  Psychiatric:         Mood and Affect: Mood normal          Behavior: Behavior normal          Thought Content: Thought content normal          Judgment: Judgment normal        BMI Counseling: Body mass index is 32 26 kg/m²  The BMI is above normal  Nutrition recommendations include reducing portion sizes and 3-5 servings of fruits/vegetables daily  Exercise recommendations include moderate aerobic physical activity for 150 minutes/week and exercising 3-5 times per week

## 2023-01-13 ENCOUNTER — CONSULT (OUTPATIENT)
Dept: VASCULAR SURGERY | Facility: CLINIC | Age: 38
End: 2023-01-13

## 2023-01-13 VITALS
HEART RATE: 68 BPM | DIASTOLIC BLOOD PRESSURE: 68 MMHG | HEIGHT: 67 IN | SYSTOLIC BLOOD PRESSURE: 116 MMHG | WEIGHT: 205 LBS | BODY MASS INDEX: 32.18 KG/M2

## 2023-01-13 DIAGNOSIS — I83.811 VARICOSE VEINS OF RIGHT LOWER EXTREMITY WITH PAIN: Primary | ICD-10-CM

## 2023-01-13 NOTE — PROGRESS NOTES
Assessment/Plan:    Varicose veins of right lower extremity with pain  40yo female, nonsmoker, with PMHx significant for obesity, hypothyroidism, RLE varicose veins  She presents today for consultation regarding RLE symptomatic varicose veins with pain    -No recent testing  -RLE with truncal varicosities extending from posterior thigh to lateral knee  No spider or reticular veins  -Reports pain in posterior knee with prolonged activity  Denies swelling, discoloration, ulcerations    -No prior hx of DVT, SVT, prior interventions    -We discussed the pathophysiology of varicose veins, risk factors, conservative mgmt and potential surgical interventions    -Recommend 3 month trial of conservative measures with use of daily compression hose (20-30 mmHg), lower extremity elevation, regular exercise, and diligent skin care  - Obtain LEVR duplex in 3 months  -Rx for waist high compression provided today  15-20 mmHg    -Tubigrip size F  - Return to office with surgeon in 3 months with LEVDR for re-evaluation and discussion of surgical options  - Instructed to call the office in the interim with any questions, concerns, or new symptoms  Diagnoses and all orders for this visit:    Varicose veins of right lower extremity with pain  -     Compression Stocking  -     VAS reflux lower limb venous duplex study with reflux assesment, unilateral; Future          Subjective:      Patient ID: Tom Bullard is a 42 y o  female  MAHAD Acuna is a 57-year-old female who presents today for consultation regarding painful right lower extremity varicose veins  She reports she first noticed her varicose veins after her second pregnancy in 2017  She does report wearing compression stockings at that time however has not worn them recently  She reports the right leg is worse than the left leg  She reports most of her pain is in the posterior knee  She denies swelling, discoloration, ulcerations    She denies history of DVT, SVT  She denies previous treatment of varicose veins  She utilizes Tylenol or ibuprofen for pain with minimal relief  The following portions of the patient's history were reviewed and updated as appropriate: allergies, current medications, past family history, past medical history, past social history, past surgical history and problem list     Review of Systems   Constitutional: Negative  HENT: Negative  Eyes: Negative  Respiratory: Negative  Cardiovascular:        Painful Veins    Gastrointestinal: Negative  Endocrine: Negative  Genitourinary: Negative  Musculoskeletal: Negative  Skin: Negative  Allergic/Immunologic: Negative  Neurological: Negative  Hematological: Negative  Psychiatric/Behavioral: Negative  I have personally reviewed and made appropriate changes to the ROS that was input by the medical assistant  Objective:      Vitals:    23 1437   BP: 116/68   BP Location: Right arm   Patient Position: Sitting   Cuff Size: Standard   Pulse: 68   Weight: 93 kg (205 lb)   Height: 5' 7" (1 702 m)       Patient Active Problem List   Diagnosis   •  (spontaneous vaginal delivery)   • Acute frontal sinusitis   • Hypothyroidism   • Overweight (BMI 25 0-29  9)   • Class 1 obesity due to excess calories without serious comorbidity with body mass index (BMI) of 30 0 to 30 9 in adult   • Negative depression screening   • Annual physical exam   • Hypercholesterolemia   • Varicose veins of right lower extremity with pain       Past Surgical History:   Procedure Laterality Date   • BUNIONECTOMY Left    • COLPOSCOPY     • WISDOM TOOTH EXTRACTION         Family History   Problem Relation Age of Onset   • Cancer Mother    • Breast cancer Mother 28   • BRCA1 Negative Mother    • BRCA2 Negative Mother    • Breast cancer Maternal Aunt 77   • No Known Problems Father    • No Known Problems Sister    • No Known Problems Maternal Grandmother    • No Known Problems Maternal Grandfather    • No Known Problems Paternal Grandmother    • No Known Problems Paternal Grandfather    • No Known Problems Maternal Aunt    • No Known Problems Maternal Aunt    • No Known Problems Paternal Aunt        Social History     Socioeconomic History   • Marital status: /Civil Union     Spouse name: Not on file   • Number of children: Not on file   • Years of education: Not on file   • Highest education level: Not on file   Occupational History   • Not on file   Tobacco Use   • Smoking status: Never   • Smokeless tobacco: Never   Substance and Sexual Activity   • Alcohol use: No   • Drug use: No   • Sexual activity: Yes     Partners: Male   Other Topics Concern   • Not on file   Social History Narrative        1 child    Employed full time     Social Determinants of Health     Financial Resource Strain: Not on file   Food Insecurity: Not on file   Transportation Needs: Not on file   Physical Activity: Not on file   Stress: Not on file   Social Connections: Not on file   Intimate Partner Violence: Not on file   Housing Stability: Not on file       No Known Allergies      Current Outpatient Medications:   •  acetaminophen (TYLENOL) 325 mg tablet, Take 2 tablets by mouth every 4 (four) hours as needed for mild pain, Disp: 30 tablet, Rfl: 0  •  Cholecalciferol (VITAMIN D-3) 1000 units CAPS, Take 1 capsule by mouth daily  , Disp: , Rfl:   •  levothyroxine 200 mcg tablet, TAKE 1 TABLET DAILY EARLY IN THE MORNING, Disp: 90 tablet, Rfl: 3  •  multivitamin (THERAGRAN) TABS, Take 1 tablet by mouth daily, Disp: , Rfl:       /68 (BP Location: Right arm, Patient Position: Sitting, Cuff Size: Standard)   Pulse 68   Ht 5' 7" (1 702 m)   Wt 93 kg (205 lb)   BMI 32 11 kg/m²          Physical Exam  Vitals and nursing note reviewed  Constitutional:       Appearance: Normal appearance  She is obese  HENT:      Head: Normocephalic and atraumatic     Cardiovascular:      Rate and Rhythm: Normal rate and regular rhythm  Pulses:           Carotid pulses are 2+ on the right side and 2+ on the left side  Radial pulses are 2+ on the right side and 2+ on the left side  Femoral pulses are 2+ on the right side and 2+ on the left side  Dorsalis pedis pulses are 2+ on the right side and 2+ on the left side  Posterior tibial pulses are 2+ on the right side and 2+ on the left side  Heart sounds: Normal heart sounds  Comments: No carotid bruit   Pulmonary:      Effort: Pulmonary effort is normal  No respiratory distress  Breath sounds: Normal breath sounds  Abdominal:      General: Bowel sounds are normal  There is no distension  Palpations: Abdomen is soft  Comments: No abdominal bruit or pulsatile masses  Musculoskeletal:         General: No swelling or tenderness  Normal range of motion  Cervical back: Normal range of motion and neck supple  Right lower leg: No edema  Left lower leg: No edema  Skin:     General: Skin is warm  Capillary Refill: Capillary refill takes less than 2 seconds  Findings: Erythema present  Comments: RLE truncal varicosities extending from posterior thigh into lateral knee  ( clinical photos below) No chronic venous changes or ulcerations  Neurological:      General: No focal deficit present  Mental Status: She is alert and oriented to person, place, and time  Psychiatric:         Mood and Affect: Mood normal          Behavior: Behavior normal                        Bart Ramirez PA-C  [facility]  [phone]    This text is generated with voice recognition software  There may be translation, syntax, or grammatical errors  If have any questions, please contact the dictating provider

## 2023-01-13 NOTE — ASSESSMENT & PLAN NOTE
40yo female, nonsmoker, with PMHx significant for obesity, hypothyroidism, RLE varicose veins  She presents today for consultation regarding RLE symptomatic varicose veins with pain    -No recent testing  -RLE with truncal varicosities extending from posterior thigh to lateral knee  No spider or reticular veins  -Reports pain in posterior knee with prolonged activity  Denies swelling, discoloration, ulcerations    -No prior hx of DVT, SVT, prior interventions    -We discussed the pathophysiology of varicose veins, risk factors, conservative mgmt and potential surgical interventions    -Recommend 3 month trial of conservative measures with use of daily compression hose (20-30 mmHg), lower extremity elevation, regular exercise, and diligent skin care  - Obtain LEVR duplex in 3 months  -Rx for waist high compression provided today  15-20 mmHg    -Tubigrip size F  - Return to office with surgeon in 3 months with LEVDR for re-evaluation and discussion of surgical options  - Instructed to call the office in the interim with any questions, concerns, or new symptoms

## 2023-01-13 NOTE — PATIENT INSTRUCTIONS
-Recommend 3 month trial of conservative measures with use of daily compression hose (20-30 mmHg), lower extremity elevation, regular exercise, and diligent skin care  - Obtain LEVR duplex in 3 months  -tubigrip size F  -Script for waist high compression provided today  Please go to a medical supply store to obtain  - Return to office with surgeon in 3 months with LEVDR for re-evaluation and discussion of surgical options  - Instructed to call the office in the interim with any questions, concerns, or new symptoms  Endovenous Ablation   AMBULATORY CARE:   What you need to know about endovenous ablation:  Endovenous ablation is a procedure that uses radiofrequency or laser energy to treat varicose veins  Varicose veins are large, twisted veins in your legs that bulge out under your skin  Endovenous ablation may help treat pain, discolored skin, or ulcers in your leg that are caused by varicose veins  How to prepare for endovenous ablation:   Your healthcare provider will talk to you about how to prepare for your procedure  He or she may tell you not to eat or drink anything several hours before your procedure  He or she will tell you which medicines to take or not take on the day of your procedure  You may need to stop taking blood thinners several days before your procedure to prevent bleeding  You may need an ultrasound to help your healthcare provider plan your procedure  An ultrasound may be done to show the shape and location of your varicose veins  You will need to plan for someone to drive you home from your procedure  What will happen during endovenous ablation:   You may be given local anesthesia to numb the surgery area  With local anesthesia, you may still feel pressure or pushing during the procedure, but you should not feel any pain  You may also be given IV sedation to help you relax during the procedure  Your healthcare provider may make a small cut in your skin   He or she will insert a small catheter through your skin and into your vein  Your healthcare provider will send laser or radiofrequency energy through the catheter and heat your vein  The heat will close your vein to stop blood flow  Your varicose veins will shrink and stop bulging under your skin  Your healthcare provider will remove the catheter  He or she will place a small bandage over your incision  What to expect after endovenous ablation:   You may spend a night in the hospital so the circulation in your leg can be monitored  You may need to wear compression stockings  The stockings are tight and put pressure on your legs  This improves blood flow and helps prevent clots  You may need an ultrasound within 72 hours to look at your veins and check for blood clots  Your healthcare provider may tell you to start your normal activities immediately after endovenous ablation  He or she may also tell you to avoid air travel or sitting for long periods of time  You may have mild to moderate pain  You may have mild to severe bruising  Your healthcare provider will tell you how to treat pain and bruising  Risks of endovenous ablation:  You may bleed more than usual or develop an infection  You may develop a pocket of blood under your skin that may need to be removed  You may get a blood clot in your leg  This may become life-threatening  Nerves and blood vessels may become damaged during your procedure  Your vein may become swollen, red, and painful  Your skin may be burned  Call your local emergency number (911 in the [address] [place] [address],[address]) for any of the following: You feel lightheaded, short of breath, and have chest pain  You cough up blood  You have trouble breathing  Seek care immediately if:   Blood soaks through your bandage  Your leg feels warm, tender and painful  Any part of your leg is numb or pale to the touch  Call your doctor if:   You have a fever or chills      Your wound is red, swollen, or draining pus     You have nausea or vomiting  Your skin is itchy, swollen, or you have a rash  You have questions or concerns about your condition or care  Care for yourself at home:   Stay active  Do not spend too much time sitting or standing  If you need to stand, shift your weight from leg to leg often  Take short walks throughout the day  Walking will improve blood flow and prevent blood clots in your leg  Ask your healthcare provider how much activity you need to do every day  Do not cross your legs when you sit  This decreases blood flow to your feet and can make your symptoms worse  Wear compression stockings or bandages as directed  The stockings and bandages are snug and put pressure on your legs  This improves blood flow and helps prevent clots  Apply a warm compress as directed  A warm compress can be a small towel or washcloth  Moisten it in warm (not hot) water  Apply the warm compress to your leg  A warm compress may help with discomfort from injury to your vein during the procedure  Elevate your leg  Keep your leg above the level of your heart as often as possible  This will help decrease swelling and pain  Prop your leg on pillows or blankets to keep it elevated comfortably  Do not wear tight clothing or shoes  Do not wear high-heeled shoes  Do not wear clothes that are tight around the waist or knees  Medicines: You may need any of the following:  Prescription pain medicine  may be given  Ask your healthcare provider how to take this medicine safely  Some prescription pain medicines contain acetaminophen  Do not take other medicines that contain acetaminophen without talking to your healthcare provider  Too much acetaminophen may cause liver damage  Prescription pain medicine may cause constipation  Ask your healthcare provider how to prevent or treat constipation  NSAIDs , such as ibuprofen, help decrease swelling, pain, and fever   NSAIDs can cause stomach bleeding or kidney problems in certain people  If you take blood thinner medicine, always ask your healthcare provider if NSAIDs are safe for you  Always read the medicine label and follow directions  Take your medicine as directed  Contact your healthcare provider if you think your medicine is not helping or if you have side effects  Tell him or her if you are allergic to any medicine  Keep a list of the medicines, vitamins, and herbs you take  Include the amounts, and when and why you take them  Bring the list or the pill bottles to follow-up visits  Carry your medicine list with you in case of an emergency  Care for the procedure area:  Carefully wash the area with soap and water  Dry the area and put on new, clean bandages as directed  Change your bandages when they get wet or dirty  Check for signs of infection such as redness, swelling, or pus  Follow up with your doctor as directed:  Write down your questions so you remember to ask them during your visits  © Copyright [employer] 2022 Information is for End User's use only and may not be sold, redistributed or otherwise used for commercial purposes  All illustrations and images included in CareNotes® are the copyrighted property of A D A M [employer] Inc  or [facility] Mychal Phillips   The above information is an  only  It is not intended as medical advice for individual conditions or treatments  Talk to your doctor, nurse or pharmacist before following any medical regimen to see if it is safe and effective for you

## 2023-03-01 ENCOUNTER — HOSPITAL ENCOUNTER (OUTPATIENT)
Dept: MAMMOGRAPHY | Facility: MEDICAL CENTER | Age: 38
Discharge: HOME | End: 2023-03-01

## 2023-03-01 VITALS — HEIGHT: 67 IN | BODY MASS INDEX: 32.18 KG/M2 | WEIGHT: 205.03 LBS

## 2023-03-01 DIAGNOSIS — Z12.31 ENCOUNTER FOR SCREENING MAMMOGRAM FOR MALIGNANT NEOPLASM OF BREAST: ICD-10-CM

## 2023-05-30 ENCOUNTER — PREP FOR PROCEDURE (OUTPATIENT)
Dept: VASCULAR SURGERY | Facility: CLINIC | Age: 38
End: 2023-05-30

## 2023-05-30 ENCOUNTER — TELEPHONE (OUTPATIENT)
Dept: VASCULAR SURGERY | Facility: CLINIC | Age: 38
End: 2023-05-30

## 2023-05-30 ENCOUNTER — OFFICE VISIT (OUTPATIENT)
Dept: VASCULAR SURGERY | Facility: CLINIC | Age: 38
End: 2023-05-30

## 2023-05-30 VITALS
BODY MASS INDEX: 31.31 KG/M2 | HEART RATE: 84 BPM | SYSTOLIC BLOOD PRESSURE: 112 MMHG | WEIGHT: 206.6 LBS | DIASTOLIC BLOOD PRESSURE: 62 MMHG | HEIGHT: 68 IN | OXYGEN SATURATION: 98 % | TEMPERATURE: 97.3 F

## 2023-05-30 DIAGNOSIS — I83.811 VARICOSE VEINS OF RIGHT LOWER EXTREMITY WITH PAIN: Primary | ICD-10-CM

## 2023-05-30 DIAGNOSIS — I87.2 VENOUS INSUFFICIENCY OF RIGHT LOWER EXTREMITY: ICD-10-CM

## 2023-05-30 DIAGNOSIS — I87.2 VENOUS INSUFFICIENCY OF RIGHT LOWER EXTREMITY: Primary | ICD-10-CM

## 2023-05-30 DIAGNOSIS — E66.09 CLASS 1 OBESITY DUE TO EXCESS CALORIES WITHOUT SERIOUS COMORBIDITY WITH BODY MASS INDEX (BMI) OF 30.0 TO 30.9 IN ADULT: ICD-10-CM

## 2023-05-30 DIAGNOSIS — I83.811 VARICOSE VEINS OF RIGHT LOWER EXTREMITY WITH PAIN: ICD-10-CM

## 2023-05-30 PROBLEM — J01.10 ACUTE FRONTAL SINUSITIS: Status: RESOLVED | Noted: 2017-06-01 | Resolved: 2023-05-30

## 2023-05-30 RX ORDER — CHLORHEXIDINE GLUCONATE 0.12 MG/ML
15 RINSE ORAL ONCE
OUTPATIENT
Start: 2023-05-30 | End: 2023-05-30

## 2023-05-30 NOTE — TELEPHONE ENCOUNTER
REMINDER: Under Reason For Call, comments MUST be formatted as:   (Surgeon's Initials) / (Procedure)      Special Instructions / FYI:     Procedure: R GSV EVLT and stab phlebectomies    Level: 4 - Route clearance(s) to [facility] Surgery Coordinator Pool    Allergies: Patient has no known allergies  Instructions Given: NO Bowel Prep General Instructions     Dialysis: Patient is not on dialysis  Return Visit Required Prior to Procedure: No     Consent: I certify that patient has signed, printed, timed, and dated their surgery consent  I certify that the patient's LEGAL NAME and DATE OF BIRTH are written in the upper left corner on BOTH sides of the consent  I certify that BOTH sides of the completed surgery consent have been scanned into the patient's Epic chart by myself on 5/30/2023  Yes, I have LABELED the consent in Epic as Consent for Vascular Procedure  For Surgical Clearances     Levels   1-3   ROUTE this encounter to [facility] Clearance Pool (AND)   [facility] Surgery Coordinator Pool     Level   4   ROUTE this encounter to [facility] Surgery Coordinator Pool       HYDRATION CLEARANCES   ONLY ROUTE TO  [facility] Clearance Pool     Patient does not require any pre operative clearance  Yes, I have ROUTED this encounter to [facility] Surgery Coordinator and/or [facility] Clearance Pool

## 2023-05-30 NOTE — PATIENT INSTRUCTIONS
"1) Venous insufficiency  -you have leaky valves in the veins in the right leg  -we are going to double down on medical management to help with symptoms including daily compression use, leg elevation, exercise, weight loss, and skin care  -we are also planning surgery for this called EVLT (endovenous laser ablation) and stab phlebectomies (removal of varicose veins)    -to find compression online, try \"Slide\" try the knee high 20-30mmHg; make sure to measure yourself to get the best fit; my favorite brand is Sigvaris soft opaque but try any that you like  "

## 2023-05-30 NOTE — TELEPHONE ENCOUNTER
Left message for patient to call us back so that we can schedule her surgery for 7-28-23 at [facility]

## 2023-05-30 NOTE — PROGRESS NOTES
Assessment/Plan:    Pt is a 43 yo F w/ hypothyroidism, obesity, HLD, venous insufficiency    Venous insufficiency of right lower extremity  Varicose veins of right lower extremity with pain  -     Case request operating room: R GSV EVLT and stab phlebectomies; Standing  -     Basic metabolic panel; Future  -     CBC and Platelet; Future  -     Case request operating room: R GSV EVLT and stab phlebectomies  -painful large varicosity of the R medial thigh and leg; mild edema  -reviewed reflux study which shows significant reflux throughout the R GSV from inguinal to ankle levels; there is also mentino of deep reflux but does not list the level this occurs; I have messaged the vascular tech for more information regarding the results of this portion of the study; awaiting response  -discussed the pathophysiology of venous disease and options for treatment including continued conservative management +/- surgical intervention; patient feels that her symptoms are life-limiting and would like to pursue surgical management; she is somewhat compliant with medical management currently; she did a daily 3month trial but stopped compression once the weather became warm  She does not elevate, does walking for exercise, no weight loss; skin in good condition; discussed risks and benefits of surgery including risk of EHIT  -plan for R GSV EVLT and stab phlebectomies  -labs    Class 1 obesity due to excess calories without serious comorbidity with body mass index (BMI) of 30 0 to 30 9 in adult  -discussed weight loss and role this plays in venous insufficiency    Other orders  -     Diet NPO; Sips with meds; Standing  -     Void on call to OR; Standing  -     Insert peripheral IV; Standing  -     Nursing Communication CHG bath, have staff wash entire body (neck down) per pre op bathing protocol   Routine, evening prior to, and day of surgery ; Standing  -     Nursing Communication Swab both nares with Povidone-Iodine solution, EXCLUDE if patient has shellfish/Iodine allergy, and replace with nasal alcohol swabstick  Routine, day of surgery, on call to OR ; Standing  -     chlorhexidine (PERIDEX) 0 12 % oral rinse 15 mL  -     Shower/scrub; Standing  -     Nursing communication Clip hair (do not shave) prior to surgery; Standing  -     ceFAZolin (ANCEF) 2,000 mg in dextrose 5 % 100 mL IVPB        EVLT Operative Scheduling Information:    [facility]    Physician:  Me    Surgery: R GSV EVLT and stab phlebectomies    Urgency:  Standard    Level:  Level 4: Outpatients to be scheduled for screening procedures and elective surgery that can be delayed for longer than one month without reasonable expectation of detriment to patient  Case Length:  2 5 hours    Post-op Bed:  Outpatient    OR Table:  Standard    Equipment Needs:  Vascular technologist    Medication Instructions:  None    Hydration:  No    Contrast Allergy:  No    Venous Clinical Severity Scores (VCSS)  Item Absent   (0 points) Mild   (1 point) Moderate   (2 points) Severe   (3 points)   Pain [] None [] Occasional [x] Daily [] Daily limiting   Varicose veins [] None [] Few [] Calf or thigh [x] Calf and thigh   Venous edema [] None [x] Foot and ankle [] Above ankle, below knee [] To knee of above   Skin pigmentation [x] None [] Perimalleolar [] Diffuse, lower 1/3 calf [] Wider, above lower 1/3 calf   Inflammation [x] None [] Perimalleolar [] Diffuse, lower 1/3 calf [] Wider, above lower 1/3 calf   Induration [x] None [] Perimalleolar [] Diffuse, lower 1/3 calf [] Wider, above lower 1/3 calf   No  active ulcers [x] None [] 1 [] 2 [] ? 3   Active ulcer size [x] None [] <2 cm [] 2 - 6 cm [] >6 cm   Ulcer duration [x] None [] <3 months [] 3 - 12 months [] >1 year   Compression therapy [] None [x] Intermittent [] Most days [] Fully comply   Total 7          CEAP Clinical Classification  [x] Symptomatic   [] Asymptomatic     [] Class 0 No visible or palpable signs of venous disease   [] Class 1 Telangiectasies or reticular veins   [] Class 2 Varicose veins; distinguished from reticular veins by a diameter of 3mm or more   [x] Class 3 Edema   [] Class 4 Changes in skin and subcutaneous tissue secondary to CVD    [] Class 4a Pigmentation or eczema   [] Class 4b Lipodermatosclerosis or atrophie natanael   [] Class 5 Healed venous ulcer   [] Class 6 Active venous ulcer             Subjective:      Patient ID: Lai Oliveira is a 42 y o  female  Patient presents today for review of LEV done 4/14/23  Patient reports bulging veins to RLE with pain from thigh to knee, voices,swelling,ache and itching at times  Patient does wear compression and elevates legs when she can  Pt is a non smoker  HPI:    Patient presents to discuss venous disease  She notes varicose veins in the RLE  These are painful and bulging  Pain is worst in the popliteal fossa  She has some darkening at the R ankle  She has aching pain and itching  Symptoms are worst in the summer  Symptoms started around '15 and worsened with pregnancies ('15 and '17)  Since last visit, she wore waist-high compression daily for 3 months  She is not wearing them now  She does walking for exercise  Her weight is stable  She does not elevate her legs  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  Denies hx of blood clots  She has venous disease hx in her maternal grandmother  The following portions of the patient's history were reviewed and updated as appropriate: allergies, current medications, past family history, past medical history, past social history, past surgical history and problem list     Review of Systems   Constitutional: Negative  HENT: Negative  Eyes: Negative  Respiratory: Negative  Negative for shortness of breath  Cardiovascular: Positive for leg swelling  Negative for chest pain  Gastrointestinal: Negative  Endocrine: Negative  "  Genitourinary: Negative  Musculoskeletal: Positive for arthralgias (R knee)  Negative for gait problem  Skin: Positive for color change  Negative for wound  Allergic/Immunologic: Negative  Neurological: Negative  Hematological: Negative  Psychiatric/Behavioral: Negative  Objective:      /62 (BP Location: Left arm, Patient Position: Sitting, Cuff Size: Standard)   Pulse 84   Temp (!) 97 3 °F (36 3 °C) (Temporal)   Ht 5' 8\" (1 727 m)   Wt 93 7 kg (206 lb 9 6 oz)   SpO2 98%   BMI 31 41 kg/m²          Physical Exam  Cardiovascular:      Pulses:           Radial pulses are 2+ on the right side and 2+ on the left side  Dorsalis pedis pulses are 2+ on the right side and 2+ on the left side  Posterior tibial pulses are 0 on the right side and 2+ on the left side  Heart sounds: No murmur heard  Pulmonary:      Effort: No respiratory distress  Breath sounds: No wheezing or rales  Musculoskeletal:      Right lower leg: No edema  Left lower leg: No edema  Skin:     Comments: No stasis changes  No wounds  No edema  There is a large tortuous varicosity that starts at the anteromedial proximal thigh, goes along the medial aspect of the thigh, posteriorly behind the knee and proximal calf and medially at the distal leg and ankle           I have reviewed and made appropriate changes to the review of systems input by the medical assistant  Vitals:    23 0828   BP: 112/62   BP Location: Left arm   Patient Position: Sitting   Cuff Size: Standard   Pulse: 84   Temp: (!) 97 3 °F (36 3 °C)   TempSrc: Temporal   SpO2: 98%   Weight: 93 7 kg (206 lb 9 6 oz)   Height: 5' 8\" (1 727 m)       Patient Active Problem List   Diagnosis   •  (spontaneous vaginal delivery)   • Acute frontal sinusitis   • Hypothyroidism   • Overweight (BMI 25 0-29  9)   • Class 1 obesity due to excess calories without serious comorbidity with body mass index (BMI) of 30 0 to 30 9 in " adult   • Negative depression screening   • Annual physical exam   • Hypercholesterolemia   • Varicose veins of right lower extremity with pain       Past Surgical History:   Procedure Laterality Date   • BUNIONECTOMY Left    • COLPOSCOPY     • WISDOM TOOTH EXTRACTION         Family History   Problem Relation Age of Onset   • Cancer Mother    • Breast cancer Mother 28   • BRCA1 Negative Mother    • BRCA2 Negative Mother    • Breast cancer Maternal Aunt 77   • No Known Problems Father    • No Known Problems Sister    • No Known Problems Maternal Grandmother    • No Known Problems Maternal Grandfather    • No Known Problems Paternal Grandmother    • No Known Problems Paternal Grandfather    • No Known Problems Maternal Aunt    • No Known Problems Maternal Aunt    • No Known Problems Paternal Aunt        Social History     Socioeconomic History   • Marital status: /Civil Union     Spouse name: Not on file   • Number of children: Not on file   • Years of education: Not on file   • Highest education level: Not on file   Occupational History   • Not on file   Tobacco Use   • Smoking status: Never   • Smokeless tobacco: Never   Vaping Use   • Vaping Use: Never used   Substance and Sexual Activity   • Alcohol use: No   • Drug use: No   • Sexual activity: Yes     Partners: Male   Other Topics Concern   • Not on file   Social History Narrative        1 child    Employed full time     Social Determinants of Health     Financial Resource Strain: Not on file   Food Insecurity: Not on file   Transportation Needs: Not on file   Physical Activity: Not on file   Stress: Not on file   Social Connections: Not on file   Intimate Partner Violence: Not on file   Housing Stability: Not on file       No Known Allergies      Current Outpatient Medications:   •  acetaminophen (TYLENOL) 325 mg tablet, Take 2 tablets by mouth every 4 (four) hours as needed for mild pain, Disp: 30 tablet, Rfl: 0  •  Cholecalciferol (VITAMIN D-3) 1000 units CAPS, Take 1 capsule by mouth daily  , Disp: , Rfl:   •  levothyroxine 200 mcg tablet, TAKE 1 TABLET DAILY EARLY IN THE MORNING, Disp: 90 tablet, Rfl: 3  •  multivitamin (THERAGRAN) TABS, Take 1 tablet by mouth daily, Disp: , Rfl:

## 2023-05-30 NOTE — TELEPHONE ENCOUNTER
Verified patient's insurance   CONFIRMED - Patient's insurance is [employer]  Is patient requesting a call when authorization has been obtained? Patient did not request a call  Surgery Date: 8/18/23  Primary Surgeon: LMD // Serge Campbell (NPI: [id number])  Assisting Surgeon: Not Applicable (N/A)  [id number] / NPI: [id number])  Inpatient / Outpatient: Outpatient  Level: 4    Clearance Received: No clearance ordered  Consent Received: Yes, scanned into Epic on 5/30/23  Medication Hold / Last Dose: Not Applicable (N/A)  IR Notified: Not Applicable (N/A)  Rep  Notified: Not Applicable (N/A)  Equipment Needs: Not Applicable (N/A)  [facility] Lab Requested: 5/30/23  Patient Contacted: 5/30/23 [facility] s/w patient     Diagnosis: I87 2, I83 811  Procedure/ CPT Code(s): (EVLT) Endovenous Laser Treatment WITH Stab Phlebectomies of the right upper leg // CPT: 91347, 46755     For varicose vein related procedures:   Last LEVDR: Yes, patient's Haim Shannan was completed within 12-months of their procedure date    CEAP Classification: 3  VCSS: 7    Post Operative Date/ Time: To Be Determined (TBD)     Pt will have blood work done at [facility]

## 2023-06-16 ENCOUNTER — APPOINTMENT (OUTPATIENT)
Dept: LAB | Facility: CLINIC | Age: 38
End: 2023-06-16
Payer: COMMERCIAL

## 2023-06-16 DIAGNOSIS — E03.9 ACQUIRED HYPOTHYROIDISM: ICD-10-CM

## 2023-06-16 LAB — TSH SERPL DL<=0.05 MIU/L-ACNC: 0.69 UIU/ML (ref 0.45–4.5)

## 2023-06-16 PROCEDURE — 84443 ASSAY THYROID STIM HORMONE: CPT

## 2023-06-16 PROCEDURE — 36415 COLL VENOUS BLD VENIPUNCTURE: CPT

## 2023-06-20 ENCOUNTER — OFFICE VISIT (OUTPATIENT)
Dept: FAMILY MEDICINE CLINIC | Facility: CLINIC | Age: 38
End: 2023-06-20
Payer: COMMERCIAL

## 2023-06-20 VITALS
HEART RATE: 65 BPM | DIASTOLIC BLOOD PRESSURE: 72 MMHG | WEIGHT: 204.4 LBS | OXYGEN SATURATION: 99 % | SYSTOLIC BLOOD PRESSURE: 108 MMHG | TEMPERATURE: 98.7 F | BODY MASS INDEX: 30.98 KG/M2 | HEIGHT: 68 IN

## 2023-06-20 DIAGNOSIS — E78.5 HYPERLIPIDEMIA, UNSPECIFIED HYPERLIPIDEMIA TYPE: ICD-10-CM

## 2023-06-20 DIAGNOSIS — E66.9 CLASS 1 OBESITY WITHOUT SERIOUS COMORBIDITY WITH BODY MASS INDEX (BMI) OF 31.0 TO 31.9 IN ADULT, UNSPECIFIED OBESITY TYPE: ICD-10-CM

## 2023-06-20 DIAGNOSIS — E03.9 ACQUIRED HYPOTHYROIDISM: Primary | ICD-10-CM

## 2023-06-20 PROCEDURE — 99214 OFFICE O/P EST MOD 30 MIN: CPT | Performed by: FAMILY MEDICINE

## 2023-06-20 NOTE — PROGRESS NOTES
Assessment/Plan:    No problem-specific Assessment & Plan notes found for this encounter  Diagnoses and all orders for this visit:    Acquired hypothyroidism  Comments:  stable  continue medication at same dose  follow up with Annual   Orders:  -     TSH, 3rd generation with Free T4 reflex; Future  -     CBC and differential; Future  -     Comprehensive metabolic panel; Future    Hyperlipidemia, unspecified hyperlipidemia type  Comments:  stable  f/u accordingly   Orders:  -     CBC and differential; Future  -     Comprehensive metabolic panel; Future  -     Lipid Panel with Direct LDL reflex; Future    Class 1 obesity without serious comorbidity with body mass index (BMI) of 31 0 to 31 9 in adult, unspecified obesity type  Comments:  diet and exercise couseling and education             PHQ-2/9 Depression Screening    Little interest or pleasure in doing things: 0 - not at all  Feeling down, depressed, or hopeless: 0 - not at all  PHQ-2 Score: 0  PHQ-2 Interpretation: Negative depression screen            Subjective:      Patient ID: Isauro Morris is a 34 y o  female  34year old female with a PMH of hypothyroidism presents to follow up regarding thyroid disorder  She doesn't have any active complaints       Thyroid Problem  Presents for follow-up visit  Patient reports no anxiety, cold intolerance, constipation, depressed mood, diaphoresis, diarrhea, dry skin, fatigue, hair loss, heat intolerance, hoarse voice, leg swelling, menstrual problem, nail problem, palpitations, tremors, visual change, weight gain or weight loss  The following portions of the patient's history were reviewed and updated as appropriate: past family history, past medical history and problem list     Review of Systems   Constitutional: Negative for chills, diaphoresis, fatigue, fever, weight gain and weight loss  HENT: Negative  Negative for hearing loss and hoarse voice  Eyes: Negative    Negative for "visual disturbance  Respiratory: Negative for shortness of breath and wheezing  Cardiovascular: Negative for chest pain and palpitations  Gastrointestinal: Negative for abdominal pain, blood in stool, constipation, diarrhea, nausea and vomiting  Endocrine: Negative  Negative for cold intolerance and heat intolerance  Genitourinary: Negative for difficulty urinating, dysuria and menstrual problem  Musculoskeletal: Negative for arthralgias and myalgias  Skin: Negative  Allergic/Immunologic: Negative  Neurological: Negative for tremors, seizures, syncope and headaches  Hematological: Negative for adenopathy  Psychiatric/Behavioral: Negative  The patient is not nervous/anxious  Objective:    /72 (BP Location: Left arm, Patient Position: Sitting)   Pulse 65   Temp 98 7 °F (37 1 °C) (Tympanic)   Ht 5' 8\" (1 727 m)   Wt 92 7 kg (204 lb 6 4 oz)   LMP 05/24/2023 (Approximate)   SpO2 99%   BMI 31 08 kg/m²      Physical Exam  Vitals and nursing note reviewed  Constitutional:       General: She is not in acute distress  Appearance: Normal appearance  She is not ill-appearing or diaphoretic  HENT:      Head: Normocephalic and atraumatic  Mouth/Throat:      Mouth: Mucous membranes are moist       Pharynx: Oropharynx is clear  Eyes:      General: Lids are normal  Lids are everted, no foreign bodies appreciated  Extraocular Movements: Extraocular movements intact  Conjunctiva/sclera: Conjunctivae normal    Cardiovascular:      Rate and Rhythm: Normal rate and regular rhythm  Heart sounds: Normal heart sounds  No murmur heard  Pulmonary:      Effort: Pulmonary effort is normal  No respiratory distress  Breath sounds: Normal breath sounds  No wheezing, rhonchi or rales  Abdominal:      General: Abdomen is flat  Bowel sounds are normal  There is no distension  Palpations: Abdomen is soft  There is no mass  Tenderness:  There is no abdominal " tenderness  There is no guarding or rebound  Musculoskeletal:      Cervical back: Normal range of motion and neck supple  No rigidity  Right lower leg: No edema  Left lower leg: No edema  Lymphadenopathy:      Cervical: No cervical adenopathy  Skin:     Capillary Refill: Capillary refill takes less than 2 seconds  Neurological:      General: No focal deficit present  Mental Status: She is alert and oriented to person, place, and time  Deep Tendon Reflexes:      Reflex Scores:       Patellar reflexes are 2+ on the right side and 2+ on the left side  Psychiatric:         Mood and Affect: Mood normal          Behavior: Behavior normal          Thought Content:  Thought content normal          Judgment: Judgment normal

## 2023-07-17 DIAGNOSIS — E03.9 HYPOTHYROIDISM, UNSPECIFIED TYPE: ICD-10-CM

## 2023-07-17 RX ORDER — LEVOTHYROXINE SODIUM 0.2 MG/1
TABLET ORAL
Qty: 90 TABLET | Refills: 3 | Status: SHIPPED | OUTPATIENT
Start: 2023-07-17

## 2023-08-02 ENCOUNTER — APPOINTMENT (OUTPATIENT)
Dept: LAB | Facility: CLINIC | Age: 38
End: 2023-08-02
Payer: COMMERCIAL

## 2023-08-02 DIAGNOSIS — I83.811 VARICOSE VEINS OF RIGHT LOWER EXTREMITY WITH PAIN: ICD-10-CM

## 2023-08-02 DIAGNOSIS — E03.9 ACQUIRED HYPOTHYROIDISM: ICD-10-CM

## 2023-08-02 DIAGNOSIS — I87.2 VENOUS INSUFFICIENCY OF RIGHT LOWER EXTREMITY: ICD-10-CM

## 2023-08-02 DIAGNOSIS — E78.5 HYPERLIPIDEMIA, UNSPECIFIED HYPERLIPIDEMIA TYPE: ICD-10-CM

## 2023-08-02 LAB
ANION GAP SERPL CALCULATED.3IONS-SCNC: 1 MMOL/L
BUN SERPL-MCNC: 10 MG/DL (ref 5–25)
CALCIUM SERPL-MCNC: 9.2 MG/DL (ref 8.3–10.1)
CHLORIDE SERPL-SCNC: 111 MMOL/L (ref 96–108)
CO2 SERPL-SCNC: 27 MMOL/L (ref 21–32)
CREAT SERPL-MCNC: 0.8 MG/DL (ref 0.6–1.3)
ERYTHROCYTE [DISTWIDTH] IN BLOOD BY AUTOMATED COUNT: 13.2 % (ref 11.6–15.1)
GFR SERPL CREATININE-BSD FRML MDRD: 92 ML/MIN/1.73SQ M
GLUCOSE P FAST SERPL-MCNC: 98 MG/DL (ref 65–99)
HCT VFR BLD AUTO: 40.9 % (ref 34.8–46.1)
HGB BLD-MCNC: 13.5 G/DL (ref 11.5–15.4)
MCH RBC QN AUTO: 28.1 PG (ref 26.8–34.3)
MCHC RBC AUTO-ENTMCNC: 33 G/DL (ref 31.4–37.4)
MCV RBC AUTO: 85 FL (ref 82–98)
PLATELET # BLD AUTO: 251 THOUSANDS/UL (ref 149–390)
PMV BLD AUTO: 9.9 FL (ref 8.9–12.7)
POTASSIUM SERPL-SCNC: 4.4 MMOL/L (ref 3.5–5.3)
RBC # BLD AUTO: 4.8 MILLION/UL (ref 3.81–5.12)
SODIUM SERPL-SCNC: 139 MMOL/L (ref 135–147)
WBC # BLD AUTO: 7.5 THOUSAND/UL (ref 4.31–10.16)

## 2023-08-02 PROCEDURE — 85027 COMPLETE CBC AUTOMATED: CPT

## 2023-08-02 PROCEDURE — 80048 BASIC METABOLIC PNL TOTAL CA: CPT

## 2023-08-02 PROCEDURE — 36415 COLL VENOUS BLD VENIPUNCTURE: CPT

## 2023-08-10 NOTE — PRE-PROCEDURE INSTRUCTIONS
Pre-Surgery Instructions:   Medication Instructions   • acetaminophen (TYLENOL) 325 mg tablet Uses PRN- OK to take day of surgery   • Cholecalciferol (VITAMIN D-3) 1000 units CAPS Stop taking 7 days prior to surgery. • levothyroxine 200 mcg tablet Take day of surgery. • multivitamin (THERAGRAN) TABS Stop taking 7 days prior to surgery. Medication instructions for day surgery reviewed. Please use only a sip of water to take your instructed medications. Avoid all over the counter vitamins, supplements and NSAIDS for one week prior to surgery per anesthesia guidelines. Tylenol is ok to take as needed. You will receive a call one business day prior to surgery with an arrival time and hospital directions. If your surgery is scheduled on a Monday, the hospital will be calling you on the Friday prior to your surgery. If you have not heard from anyone by 8pm, please call the hospital supervisor through the hospital  at [phone]. Quan Peed [phone]). Do not eat or drink anything after midnight the night before your surgery, including candy, mints, lifesavers, or chewing gum. Do not drink alcohol 24hrs before your surgery. Try not to smoke at least 24hrs before your surgery. Follow the pre surgery showering instructions as listed in the [facility] Experience Booklet” or otherwise provided by your surgeon's office. Do not shave the surgical area 24 hours before surgery. Do not apply any lotions, creams, including makeup, cologne, deodorant, or perfumes after showering on the day of your surgery. No contact lenses, eye make-up, or artificial eyelashes. Remove nail polish, including gel polish, and any artificial, gel, or acrylic nails if possible. Remove all jewelry including rings and body piercing jewelry. Wear causal clothing that is easy to take on and off. Consider your type of surgery. Keep any valuables, jewelry, piercings at home.  Please bring any specially ordered equipment (sling, braces) if indicated. Arrange for a responsible person to drive you to and from the hospital on the day of your surgery. Visitor Guidelines discussed. Call the surgeon's office with any new illnesses, exposures, or additional questions prior to surgery. Please reference your [facility] Surgical Experience Booklet” for additional information to prepare for your upcoming surgery.

## 2023-08-16 ENCOUNTER — ANESTHESIA EVENT (OUTPATIENT)
Dept: PERIOP | Facility: HOSPITAL | Age: 38
End: 2023-08-16
Payer: COMMERCIAL

## 2023-08-18 ENCOUNTER — HOSPITAL ENCOUNTER (OUTPATIENT)
Dept: NON INVASIVE DIAGNOSTICS | Facility: HOSPITAL | Age: 38
Discharge: HOME | End: 2023-08-18
Payer: COMMERCIAL

## 2023-08-18 ENCOUNTER — HOSPITAL ENCOUNTER (OUTPATIENT)
Facility: HOSPITAL | Age: 38
Setting detail: OUTPATIENT SURGERY
Discharge: HOME | End: 2023-08-18
Attending: SURGERY | Admitting: SURGERY
Payer: COMMERCIAL

## 2023-08-18 ENCOUNTER — ANESTHESIA (OUTPATIENT)
Dept: PERIOP | Facility: HOSPITAL | Age: 38
End: 2023-08-18
Payer: COMMERCIAL

## 2023-08-18 VITALS
BODY MASS INDEX: 31.34 KG/M2 | WEIGHT: 206.79 LBS | TEMPERATURE: 97.3 F | SYSTOLIC BLOOD PRESSURE: 119 MMHG | HEIGHT: 68 IN | OXYGEN SATURATION: 95 % | RESPIRATION RATE: 18 BRPM | HEART RATE: 91 BPM | DIASTOLIC BLOOD PRESSURE: 61 MMHG

## 2023-08-18 DIAGNOSIS — I83.811 VARICOSE VEINS OF LEG WITH PAIN, RIGHT: ICD-10-CM

## 2023-08-18 DIAGNOSIS — I87.2 VENOUS INSUFFICIENCY OF RIGHT LOWER EXTREMITY: Primary | ICD-10-CM

## 2023-08-18 LAB
EXT PREGNANCY TEST URINE: NEGATIVE
EXT. CONTROL: NORMAL

## 2023-08-18 PROCEDURE — 99024 POSTOP FOLLOW-UP VISIT: CPT | Performed by: SURGERY

## 2023-08-18 PROCEDURE — 93971 EXTREMITY STUDY: CPT

## 2023-08-18 PROCEDURE — 81025 URINE PREGNANCY TEST: CPT | Performed by: ANESTHESIOLOGY

## 2023-08-18 PROCEDURE — 36478 ENDOVENOUS LASER 1ST VEIN: CPT | Performed by: SURGERY

## 2023-08-18 PROCEDURE — 37766 PHLEB VEINS - EXTREM 20+: CPT | Performed by: SURGERY

## 2023-08-18 RX ORDER — FENTANYL CITRATE 50 UG/ML
INJECTION, SOLUTION INTRAMUSCULAR; INTRAVENOUS AS NEEDED
Status: DISCONTINUED | OUTPATIENT
Start: 2023-08-18 | End: 2023-08-18

## 2023-08-18 RX ORDER — MEPERIDINE HYDROCHLORIDE 25 MG/ML
12.5 INJECTION INTRAMUSCULAR; INTRAVENOUS; SUBCUTANEOUS
Status: DISCONTINUED | OUTPATIENT
Start: 2023-08-18 | End: 2023-08-18 | Stop reason: HOSPADM

## 2023-08-18 RX ORDER — FENTANYL CITRATE/PF 50 MCG/ML
25 SYRINGE (ML) INJECTION
Status: DISCONTINUED | OUTPATIENT
Start: 2023-08-18 | End: 2023-08-18 | Stop reason: HOSPADM

## 2023-08-18 RX ORDER — ONDANSETRON 2 MG/ML
4 INJECTION INTRAMUSCULAR; INTRAVENOUS ONCE AS NEEDED
Status: DISCONTINUED | OUTPATIENT
Start: 2023-08-18 | End: 2023-08-18 | Stop reason: HOSPADM

## 2023-08-18 RX ORDER — OXYCODONE HYDROCHLORIDE AND ACETAMINOPHEN 5; 325 MG/1; MG/1
1 TABLET ORAL EVERY 4 HOURS PRN
Status: DISCONTINUED | OUTPATIENT
Start: 2023-08-18 | End: 2023-08-18 | Stop reason: HOSPADM

## 2023-08-18 RX ORDER — CEFAZOLIN SODIUM 2 G/50ML
2000 SOLUTION INTRAVENOUS ONCE
Status: COMPLETED | OUTPATIENT
Start: 2023-08-18 | End: 2023-08-18

## 2023-08-18 RX ORDER — ONDANSETRON 2 MG/ML
INJECTION INTRAMUSCULAR; INTRAVENOUS AS NEEDED
Status: DISCONTINUED | OUTPATIENT
Start: 2023-08-18 | End: 2023-08-18

## 2023-08-18 RX ORDER — CHLORHEXIDINE GLUCONATE 0.12 MG/ML
15 RINSE ORAL ONCE
Status: COMPLETED | OUTPATIENT
Start: 2023-08-18 | End: 2023-08-18

## 2023-08-18 RX ORDER — PROPOFOL 10 MG/ML
INJECTION, EMULSION INTRAVENOUS AS NEEDED
Status: DISCONTINUED | OUTPATIENT
Start: 2023-08-18 | End: 2023-08-18

## 2023-08-18 RX ORDER — OXYCODONE HYDROCHLORIDE AND ACETAMINOPHEN 5; 325 MG/1; MG/1
1 TABLET ORAL EVERY 6 HOURS PRN
Qty: 15 TABLET | Refills: 0 | Status: SHIPPED | OUTPATIENT
Start: 2023-08-18 | End: 2023-08-28

## 2023-08-18 RX ORDER — DEXAMETHASONE SODIUM PHOSPHATE 10 MG/ML
INJECTION, SOLUTION INTRAMUSCULAR; INTRAVENOUS AS NEEDED
Status: DISCONTINUED | OUTPATIENT
Start: 2023-08-18 | End: 2023-08-18

## 2023-08-18 RX ORDER — LIDOCAINE HYDROCHLORIDE 20 MG/ML
INJECTION, SOLUTION EPIDURAL; INFILTRATION; INTRACAUDAL; PERINEURAL AS NEEDED
Status: DISCONTINUED | OUTPATIENT
Start: 2023-08-18 | End: 2023-08-18

## 2023-08-18 RX ORDER — SODIUM CHLORIDE, SODIUM LACTATE, POTASSIUM CHLORIDE, CALCIUM CHLORIDE 600; 310; 30; 20 MG/100ML; MG/100ML; MG/100ML; MG/100ML
125 INJECTION, SOLUTION INTRAVENOUS CONTINUOUS
Status: DISCONTINUED | OUTPATIENT
Start: 2023-08-18 | End: 2023-08-18 | Stop reason: HOSPADM

## 2023-08-18 RX ORDER — HYDROMORPHONE HCL/PF 1 MG/ML
0.5 SYRINGE (ML) INJECTION
Status: DISCONTINUED | OUTPATIENT
Start: 2023-08-18 | End: 2023-08-18 | Stop reason: HOSPADM

## 2023-08-18 RX ORDER — MIDAZOLAM HYDROCHLORIDE 2 MG/2ML
INJECTION, SOLUTION INTRAMUSCULAR; INTRAVENOUS AS NEEDED
Status: DISCONTINUED | OUTPATIENT
Start: 2023-08-18 | End: 2023-08-18

## 2023-08-18 RX ORDER — ACETAMINOPHEN 325 MG/1
975 TABLET ORAL EVERY 6 HOURS SCHEDULED
Status: CANCELLED | OUTPATIENT
Start: 2023-08-18

## 2023-08-18 RX ORDER — ONDANSETRON 2 MG/ML
4 INJECTION INTRAMUSCULAR; INTRAVENOUS ONCE
Status: COMPLETED | OUTPATIENT
Start: 2023-08-18 | End: 2023-08-18

## 2023-08-18 RX ORDER — HYDROMORPHONE HCL/PF 1 MG/ML
SYRINGE (ML) INJECTION AS NEEDED
Status: DISCONTINUED | OUTPATIENT
Start: 2023-08-18 | End: 2023-08-18

## 2023-08-18 RX ADMIN — SODIUM CHLORIDE, SODIUM LACTATE, POTASSIUM CHLORIDE, AND CALCIUM CHLORIDE 125 ML/HR: .6; .31; .03; .02 INJECTION, SOLUTION INTRAVENOUS at 05:46

## 2023-08-18 RX ADMIN — FENTANYL CITRATE 100 MCG: 50 INJECTION INTRAMUSCULAR; INTRAVENOUS at 07:47

## 2023-08-18 RX ADMIN — MIDAZOLAM 2 MG: 1 INJECTION INTRAMUSCULAR; INTRAVENOUS at 07:36

## 2023-08-18 RX ADMIN — PROPOFOL 150 MG: 10 INJECTION, EMULSION INTRAVENOUS at 07:44

## 2023-08-18 RX ADMIN — DEXAMETHASONE SODIUM PHOSPHATE 10 MG: 10 INJECTION INTRAMUSCULAR; INTRAVENOUS at 08:01

## 2023-08-18 RX ADMIN — PROPOFOL 150 MG: 10 INJECTION, EMULSION INTRAVENOUS at 08:41

## 2023-08-18 RX ADMIN — CEFAZOLIN SODIUM 2000 MG: 2 SOLUTION INTRAVENOUS at 07:35

## 2023-08-18 RX ADMIN — CHLORHEXIDINE GLUCONATE 15 ML: 1.2 RINSE ORAL at 05:46

## 2023-08-18 RX ADMIN — ONDANSETRON 4 MG: 2 INJECTION INTRAMUSCULAR; INTRAVENOUS at 12:12

## 2023-08-18 RX ADMIN — ONDANSETRON 4 MG: 2 INJECTION INTRAMUSCULAR; INTRAVENOUS at 08:01

## 2023-08-18 RX ADMIN — LIDOCAINE HYDROCHLORIDE 100 MG: 20 INJECTION, SOLUTION EPIDURAL; INFILTRATION; INTRACAUDAL at 07:44

## 2023-08-18 RX ADMIN — HYDROMORPHONE HYDROCHLORIDE 1 MG: 1 INJECTION, SOLUTION INTRAMUSCULAR; INTRAVENOUS; SUBCUTANEOUS at 08:44

## 2023-08-18 NOTE — ANESTHESIA PREPROCEDURE EVALUATION
Procedure:  R GSV EVLT (Right: Leg Upper)  MULTIPLE STAB PHLEB (Right: Leg Upper)    Relevant Problems   ANESTHESIA (within normal limits)      CARDIO   (+) Hypercholesterolemia      ENDO   (+) Hypothyroidism      GI/HEPATIC (within normal limits)      PULMONARY (within normal limits)        Physical Exam    Airway    Mallampati score: I  TM Distance: >3 FB  Neck ROM: full     Dental   No notable dental hx     Cardiovascular  Cardiovascular exam normal    Pulmonary  Pulmonary exam normal     Other Findings        Anesthesia Plan  ASA Score- 2     Anesthesia Type- general with ASA Monitors. Additional Monitors:   Airway Plan: LMA. Plan Factors-    Chart reviewed. Patient summary reviewed. Induction- intravenous. Postoperative Plan-     Informed Consent- Anesthetic plan and risks discussed with patient.

## 2023-08-18 NOTE — DISCHARGE INSTR - AVS FIRST PAGE
DISCHARGE INSTRUCTIONS  VARICOSE VEIN SURGERY    ACTIVITY:  On the day of your operation, “take it easy”. You can take short walks around the house. When sitting, the leg should be elevated. The preferred position is to have the leg at or above the level of the heart. Starting on the first day after surgery, light walking is encouraged as tolerated. After your ultrasound test, you can resume your normal activity, but no heavy lifting (do not lift more than 15 pounds) or strenuous exercise for 2 weeks. You should not drive a car until your bandages are removed and you are off all narcotic pain medication. You may ride in a car. DIET: Resume your normal diet. Good nutrition is important for healing of your incision. DRESSINGS/SURGICAL SITE:  When released from the hospital, you should have a compression bandage in place on the operated leg. This bandage should feel snug, but not too tight. If the bandage becomes blood soaked or painfully tight, elevate your leg and call the office ([phone])  You may have surgical glue on your incision sites. There are stitches present under the skin which will absorb on their own. The glue is used to cover the incision, assist in closure, and prevent contamination. This adhesive will darken and peel away on its own within one to two weeks. Do not pick at it. You should shower daily. Wash incisions daily with soap and water, but do not rub or scrub the incisions; rinse thoroughly and pat dry. If the operated leg becomes increasingly painful or swollen, or if there is increasing redness or pain around your incision, contact our office. Some bruising of the skin is common after varicose vein surgery. This can be lessened by elevation of the leg. Many patients will notice some numbness of the shin, ankle, calf, or the top of the foot. This usually improves with time but may be persistent.   After surgery you can expect bruising, swelling and hard knots on your leg. As your body heals the bruising will fade and the swelling and knots will subside. Apply sunscreen with SPF 30 to incisions while sun bathing for up to one year after surgery to reduce the chances of your incisions darkening. FOLLOW UP STUDIES:  Your first post-operative appointment will be 2-3 days after your surgery. At this appointment your bandages will be removed, and you will be seen by a Vascular Surgeon, Nurse Practitioner, or Physician Assistant. An appointment for a follow up Doppler ultrasound study will be scheduled on the same day as your follow up appointment. FOLLOW UP APPOINTMENTS:  Making and keeping follow up appointments and ultrasound tests are important to your recovery. If you have difficulty making it to or keeping your follow up appointments, call the office. If you have increased pain, fever >101.5, increased drainage, redness or a bad smell at your surgery site, new coldness/numbness of your arm or leg, please call us immediately and GO directly to the ER. PLEASE CALL THE OFFICE IF YOU HAVE ANY QUESTIONS  [phone]  [phone]  [address] [facility], [address], [facility], [address] [facility]  [address] [facility], [address]  [address], [facility], [address] [facility]  [address] [facility], [address] [facility], [address]  [address] [facility],Sixth Floor, 1st Floor, [place], [address]  [address], [address], [address], [place], [address] [facility]  [address], [address], [address] [facility]  1501 [facility], [address] [facility], [address] [place] [address]  [facility] [address], [place]  [address]

## 2023-08-18 NOTE — OP NOTE
OPERATIVE REPORT  PATIENT NAME: Josue Calabrese    :  1985  MRN: [id number]  Pt Location: [place] [address]    SURGERY DATE: 2023    Surgeon(s) and Role:     Dashawn Campbell MD - Primary    Preop Diagnosis:  Venous insufficiency of right lower extremity [I87.2]  Varicose veins of right lower extremity with pain [I83.811]    Post-Op Diagnosis Codes: * Venous insufficiency of right lower extremity [I87.2]     * Varicose veins of right lower extremity with pain [I83.811]    Procedure(s):  Right GSV EVLT and stab phlebectomies x 39    Specimen(s):  none    Estimated Blood Loss:   50cc    Drains:  none    Anesthesia Type:   General    Operative Indications:  Patient is a 35 yo F w/ venous insufficiency, edema, and painful varicosities, symptoms not controlled with medical management alone, found to have significant R GSV insufficiency on duplex, presents for surgical management    Operative Findings:  Large, widely patent right greater saphenous vein successfully ablated with EVLT  Large varicosity over the medial thigh, going posteriorly and then laterally at the knee and calf    Complications:   None    Procedure and Technique:  After informed consent was obtained, the patient was brought to the operating room and placed in the supine position. She was given anesthesia and an LMA was placed. She was given IV antibiotics with ancef. The patient was then prepped and draped in the usual sterile fashion, exposing the right leg circumferentially. The duplex ultrasound was brought to the field and the greater saphenous vein was mapped from the groin to the knee. The vein was then accessed below the knee with the micropuncture set and the guidewire exchanged for the .035 J-wire which passed easily into the femoral vein. The laser sheath was inserted over the guidewire and the laser fiber was then positioned 2.5 cm from the saphenofemoral junction.  Tumescent anesthesia was then instilled under ultrasound guidance along the entire length of the greater saphenous vein. The laser was then activated at 7 de anda and withdrawn in one continuous pullback and pressure was held at the access site. Following this, insonation of the saphenofemoral junction confirmed patency and flow through the junction and no evidence of deep vein thrombosis. The greater saphenous vein showed wall thickening with little to no flow indicating successful ablation. We then turned our attention to the phlebectomy portion of the procedure. Varicosities were marked prior to the procedure with the patient in a standing position. Small stab incisions were made over the varicosities with an 11-blade and a vein hook and mosquito clamps were used to avulse these veins. A total of 39 incisions were made. Hemostasis was achieved with compression at each puncture site. The access site and skin nicks were dressed with histoacryl glue and the leg was wrapped with gauze, kerlix, and ACE from toes to thigh and coban from toes to knee. The patient was allowed to awaken and was extubated. She was transferred to the PACU for postoperative care. I was present for the entire procedure.     Patient Disposition:  PACU         SIGNATURE: Amena Campbell MD  DATE: August 18, 2023  TIME: 10:48 AM

## 2023-08-18 NOTE — ANESTHESIA POSTPROCEDURE EVALUATION
Post-Op Assessment Note    CV Status:  Stable    Pain management: adequate     Mental Status:  Alert and awake   Hydration Status:  Euvolemic   PONV Controlled:  Controlled   Airway Patency:  Patent      Post Op Vitals Reviewed: Yes      Staff: Anesthesiologist, CRNA         No notable events documented.     BP      Temp     Pulse     Resp      SpO2      /61   Pulse 91   Temp (!) 97.3 °F (36.3 °C) (Temporal)   Resp 18   Ht 5' 8" (1.727 m)   Wt 93.8 kg (206 lb 12.7 oz)   LMP 07/26/2023 (Approximate)   SpO2 95%   BMI 31.44 kg/m²

## 2023-08-18 NOTE — H&P
Assessment/Plan:     Pt is a 43 yo F w/ hypothyroidism, obesity, HLD, venous insufficiency     Venous insufficiency of right lower extremity  Varicose veins of right lower extremity with pain  -     Case request operating room: R GSV EVLT and stab phlebectomies; Standing  -     Basic metabolic panel; Future  -     CBC and Platelet; Future  -     Case request operating room: R GSV EVLT and stab phlebectomies  -painful large varicosity of the R medial thigh and leg; mild edema  -reviewed reflux study which shows significant reflux throughout the R GSV from inguinal to ankle levels; there is also mentino of deep reflux but does not list the level this occurs; I have messaged the vascular tech for more information regarding the results of this portion of the study; awaiting response  -discussed the pathophysiology of venous disease and options for treatment including continued conservative management +/- surgical intervention; patient feels that her symptoms are life-limiting and would like to pursue surgical management; she is somewhat compliant with medical management currently; she did a daily 3month trial but stopped compression once the weather became warm. She does not elevate, does walking for exercise, no weight loss; skin in good condition; discussed risks and benefits of surgery including risk of EHIT  -plan for R GSV EVLT and stab phlebectomies  -labs     Class 1 obesity due to excess calories without serious comorbidity with body mass index (BMI) of 30.0 to 30.9 in adult  -discussed weight loss and role this plays in venous insufficiency     Other orders  -     Diet NPO; Sips with meds; Standing  -     Void on call to OR; Standing  -     Insert peripheral IV; Standing  -     Nursing Communication CHG bath, have staff wash entire body (neck down) per pre op bathing protocol.  Routine, evening prior to, and day of surgery.; Standing  -     Nursing Communication Swab both nares with Povidone-Iodine solution, EXCLUDE if patient has shellfish/Iodine allergy, and replace with nasal alcohol swabstick. Routine, day of surgery, on call to OR.; Standing  -     chlorhexidine (PERIDEX) 0.12 % oral rinse 15 mL  -     Shower/scrub; Standing  -     Nursing communication Clip hair (do not shave) prior to surgery; Standing  -     ceFAZolin (ANCEF) 2,000 mg in dextrose 5 % 100 mL IVPB         EVLT Operative Scheduling Information:     [facility]     Physician: Me     Surgery: R GSV EVLT and stab phlebectomies     Urgency:  Standard     Level:  Level 4: Outpatients to be scheduled for screening procedures and elective surgery that can be delayed for longer than one month without reasonable expectation of detriment to patient.     Case Length:  2.5 hours     Post-op Bed:  Outpatient     OR Table:  Standard     Equipment Needs:  Vascular technologist     Medication Instructions:  None     Hydration:  No     Contrast Allergy:  No     Venous Clinical Severity Scores (VCSS)  Item Absent   (0 points) Mild   (1 point) Moderate   (2 points) Severe   (3 points)   Pain []? None []? Occasional [x]? Daily []? Daily limiting   Varicose veins []? None []? Few []? Calf or thigh [x]? Calf and thigh   Venous edema []? None [x]? Foot and ankle []? Above ankle, below knee []? To knee of above   Skin pigmentation [x]? None []? Perimalleolar []? Diffuse, lower 1/3 calf []? Wider, above lower 1/3 calf   Inflammation [x]? None []? Perimalleolar []? Diffuse, lower 1/3 calf []? Wider, above lower 1/3 calf   Induration [x]? None []? Perimalleolar []? Diffuse, lower 1/3 calf []? Wider, above lower 1/3 calf   No. active ulcers [x]? None []? 1 []? 2 []? ? 3   Active ulcer size [x]? None []? <2 cm []? 2 - 6 cm []? >6 cm   Ulcer duration [x]? None []? <3 months []? 3 - 12 months []? >1 year   Compression therapy []? None [x]? Intermittent []? Most days []? Fully comply   Total 7               CEAP Clinical Classification  [x]?  Symptomatic []? Asymptomatic      []? Class 0 No visible or palpable signs of venous disease   []? Class 1 Telangiectasies or reticular veins   []? Class 2 Varicose veins; distinguished from reticular veins by a diameter of 3mm or more   [x]? Class 3 Edema   []? Class 4 Changes in skin and subcutaneous tissue secondary to CVD    []? Class 4a Pigmentation or eczema   []? Class 4b Lipodermatosclerosis or atrophie natanael   []? Class 5 Healed venous ulcer   []? Class 6 Active venous ulcer                  Subjective:       Patient ID: Millie Sands is a 42 y.o. female.        Patient presents today for review of LEV done 4/14/23. Patient reports bulging veins to RLE with pain from thigh to knee, voices,swelling,ache and itching at times. Patient does wear compression and elevates legs when she can. Pt is a non smoker.      HPI:     Patient presents to discuss venous disease.     She notes varicose veins in the RLE. These are painful and bulging. Pain is worst in the popliteal fossa. She has some darkening at the R ankle. She has aching pain and itching. Symptoms are worst in the summer. Symptoms started around '15 and worsened with pregnancies ('15 and '17).    Since last visit, she wore waist-high compression daily for 3 months. She is not wearing them now. She does walking for exercise. Her weight is stable. She does not elevate her legs. 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     Denies hx of blood clots. She has venous disease hx in her maternal grandmother.        The following portions of the patient's history were reviewed and updated as appropriate: allergies, current medications, past family history, past medical history, past social history, past surgical history and problem list.     Review of Systems   Constitutional: Negative. HENT: Negative. Eyes: Negative. Respiratory: Negative. Negative for shortness of breath.     Cardiovascular: Positive for leg swelling. Negative for chest pain. Gastrointestinal: Negative. Endocrine: Negative. Genitourinary: Negative. Musculoskeletal: Positive for arthralgias (R knee). Negative for gait problem. Skin: Positive for color change. Negative for wound. Allergic/Immunologic: Negative. Neurological: Negative. Hematological: Negative. Psychiatric/Behavioral: Negative.           Objective:        /71   Pulse 67   Temp 98.2 °F (36.8 °C) (Temporal)   Resp 18   Ht 5' 8" (1.727 m)   Wt 93.8 kg (206 lb 12.7 oz)   LMP 2023 (Approximate)   SpO2 98%   BMI 31.44 kg/m²               Physical Exam  Cardiovascular:      Pulses:           Radial pulses are 2+ on the right side and 2+ on the left side. Dorsalis pedis pulses are 2+ on the right side and 2+ on the left side. Posterior tibial pulses are 0 on the right side and 2+ on the left side. Heart sounds: No murmur heard. Pulmonary:      Effort: No respiratory distress. Breath sounds: No wheezing or rales. Musculoskeletal:      Right lower leg: No edema. Left lower leg: No edema. Skin:     Comments: No stasis changes. No wounds. No edema. There is a large tortuous varicosity that starts at the anteromedial proximal thigh, goes along the medial aspect of the thigh, posteriorly behind the knee and proximal calf and medially at the distal leg and ankle        I have reviewed and made appropriate changes to the review of systems input by the medical assistant. Vitals:    08/10/23 0948 23 0548   BP:  116/71   Pulse:  67   Resp:  18   Temp:  98.2 °F (36.8 °C)   TempSrc:  Temporal   SpO2:  98%   Weight: 93 kg (205 lb) 93.8 kg (206 lb 12.7 oz)   Height: 5' 8" (1.727 m) 5' 8" (1.727 m)       Patient Active Problem List   Diagnosis   •  (spontaneous vaginal delivery)   • Hypothyroidism   • Overweight (BMI 25.0-29. 9)   • Class 1 obesity due to excess calories without serious comorbidity with body mass index (BMI) of 30.0 to 30.9 in adult   • Negative depression screening   • Annual physical exam   • Hypercholesterolemia   • Varicose veins of right lower extremity with pain   • Venous insufficiency of right lower extremity       Past Surgical History:   Procedure Laterality Date   • BUNIONECTOMY Left    • COLPOSCOPY     • WISDOM TOOTH EXTRACTION         Family History   Problem Relation Age of Onset   • Cancer Mother    • Breast cancer Mother 28   • BRCA1 Negative Mother    • BRCA2 Negative Mother    • Breast cancer Maternal Aunt 77   • No Known Problems Father    • No Known Problems Sister    • No Known Problems Maternal Grandmother    • No Known Problems Maternal Grandfather    • No Known Problems Paternal Grandmother    • No Known Problems Paternal Grandfather    • No Known Problems Maternal Aunt    • No Known Problems Maternal Aunt    • No Known Problems Paternal Aunt        Social History     Socioeconomic History   • Marital status: /Civil Union     Spouse name: Not on file   • Number of children: Not on file   • Years of education: Not on file   • Highest education level: Not on file   Occupational History   • Not on file   Tobacco Use   • Smoking status: Never   • Smokeless tobacco: Never   Vaping Use   • Vaping Use: Never used   Substance and Sexual Activity   • Alcohol use: Yes     Comment: social   • Drug use: No   • Sexual activity: Yes     Partners: Male     Birth control/protection: None   Other Topics Concern   • Not on file   Social History Narrative        1 child    Employed full time     Social Determinants of Health     Financial Resource Strain: Not on file   Food Insecurity: Not on file   Transportation Needs: Not on file   Physical Activity: Not on file   Stress: Not on file   Social Connections: Not on file   Intimate Partner Violence: Not on file   Housing Stability: Not on file       No Known Allergies      Current Facility-Administered Medications:   •  ceFAZolin (ANCEF) IVPB (premix in dextrose) 2,000 mg 50 mL, 2,000 mg, Intravenous, Once, Silvano Leggett MD  •  lactated ringers infusion, 125 mL/hr, Intravenous, Continuous, Heather Francis DO, Last Rate: 125 mL/hr at 08/18/23 0546, 125 mL/hr at 08/18/23 0546  •  lidocaine (PF) (XYLOCAINE-MPF) 1 % 25 mL, EPINEPHrine PF (ADRENALIN) 0.5 mg in sodium chloride 0.9 % 500 mL OR irrigation, , Irrigation, Once, Amena Campbell MD

## 2023-08-21 PROCEDURE — NC001 PR NO CHARGE: Performed by: SURGERY

## 2023-08-22 ENCOUNTER — OFFICE VISIT (OUTPATIENT)
Dept: VASCULAR SURGERY | Facility: CLINIC | Age: 38
End: 2023-08-22

## 2023-08-22 ENCOUNTER — HOSPITAL ENCOUNTER (OUTPATIENT)
Dept: NON INVASIVE DIAGNOSTICS | Facility: HOSPITAL | Age: 38
Discharge: HOME | End: 2023-08-22
Attending: SURGERY
Payer: COMMERCIAL

## 2023-08-22 VITALS
OXYGEN SATURATION: 99 % | DIASTOLIC BLOOD PRESSURE: 68 MMHG | HEIGHT: 68 IN | WEIGHT: 208.5 LBS | HEART RATE: 92 BPM | BODY MASS INDEX: 31.6 KG/M2 | SYSTOLIC BLOOD PRESSURE: 118 MMHG | TEMPERATURE: 97.8 F

## 2023-08-22 DIAGNOSIS — I87.2 VENOUS INSUFFICIENCY OF RIGHT LOWER EXTREMITY: ICD-10-CM

## 2023-08-22 DIAGNOSIS — I83.811 VARICOSE VEINS OF RIGHT LOWER EXTREMITY WITH PAIN: ICD-10-CM

## 2023-08-22 DIAGNOSIS — I83.811 VARICOSE VEINS OF RIGHT LOWER EXTREMITY WITH PAIN: Primary | ICD-10-CM

## 2023-08-22 DIAGNOSIS — I82.401 ACUTE DEEP VEIN THROMBOSIS (DVT) OF RIGHT LOWER EXTREMITY, UNSPECIFIED VEIN (HCC): Primary | ICD-10-CM

## 2023-08-22 DIAGNOSIS — E66.09 CLASS 1 OBESITY DUE TO EXCESS CALORIES WITHOUT SERIOUS COMORBIDITY WITH BODY MASS INDEX (BMI) OF 30.0 TO 30.9 IN ADULT: ICD-10-CM

## 2023-08-22 PROCEDURE — 93971 EXTREMITY STUDY: CPT

## 2023-08-22 PROCEDURE — 99024 POSTOP FOLLOW-UP VISIT: CPT | Performed by: SURGERY

## 2023-08-22 NOTE — PROGRESS NOTES
Assessment/Plan:    Pt is a 43 yo F w/ hypothyroidism, obesity, HLD, venous insufficiency    Varicose veins of right lower extremity with pain  Venous insufficiency of right lower extremity  Class 1 obesity due to excess calories without serious comorbidity with body mass index (BMI) of 30.0 to 30.9 in adult  -painful large varicosity of the R medial thigh and leg; mild edema  -reviewed reflux study which shows significant reflux throughout the R GSV from inguinal to ankle levels; there is also mentino of deep reflux but does not list the level this occurs; I have messaged the vascular tech for more information regarding the results of this portion of the study; awaiting response  -now s/p R GSV EVLT and stab phlebectomies x 39 8/18/23  -doing well; dressing removed today; vagal reaction; healing well  -advised to transition to tylenol/motrin for pain control  -can resume normal daily activity but no exercise/vigorous activity for 2 wks  -scheduled for standard f/u duplex after this appt  -restart compression/elevation  -f/u 6 wks    Subjective:      Patient ID: Agata Castro is a 34 y.o. female. Patient presents today for dressing removal post op EVLT with stab phlebs done 8/18/23 by DR. Ryan Mariano Doctor. Pt reports pain on and off and at night mostly and numbness when elevated on and off. Dressing removed today in office steri strips in place no bleeding noted some brusing noted . Pt is a non smoker. HPI:    Patient presents for first POC after EVLT and stabs x 39    She notes varicose veins in the RLE. These are painful and bulging. Pain is worst in the popliteal fossa. She has some darkening at the R ankle. She has aching pain and itching. Symptoms are worst in the summer. Symptoms started around '15 and worsened with pregnancies ('15 and '17). Since last visit, she wore waist-high compression daily for 3 months. She is not wearing them now. She does walking for exercise.   Her weight is stable. She does not elevate her legs. 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 Denies hx of blood clots. She has venous disease hx in her maternal grandmother. Now s/p EVLT. She is doing well. Denies any issues. Pain controlled with medication. She had a mild vagal reaction with dressing removal in office today. The following portions of the patient's history were reviewed and updated as appropriate: allergies, current medications, past family history, past medical history, past social history, past surgical history and problem list.    Review of Systems   Constitutional: Negative. HENT: Negative. Eyes: Negative. Respiratory: Negative. Cardiovascular: Negative. Gastrointestinal: Negative. Endocrine: Negative. Genitourinary: Negative. Musculoskeletal: Negative. Skin: Positive for color change. Negative for wound. Allergic/Immunologic: Negative. Neurological: Negative. Hematological: Negative. Psychiatric/Behavioral: Negative. Objective:      /68 (BP Location: Left arm, Patient Position: Sitting, Cuff Size: Standard)   Pulse 92   Temp 97.8 °F (36.6 °C) (Temporal)   Ht 5' 8" (1.727 m)   Wt 94.6 kg (208 lb 8 oz)   LMP 07/26/2023 (Approximate)   SpO2 99%   BMI 31.70 kg/m²          Physical Exam  Cardiovascular:      Pulses:           Radial pulses are 2+ on the right side and 2+ on the left side. Dorsalis pedis pulses are 2+ on the right side and 2+ on the left side. Posterior tibial pulses are 0 on the right side and 2+ on the left side. Heart sounds: No murmur heard. Pulmonary:      Effort: No respiratory distress. Breath sounds: No wheezing or rales. Musculoskeletal:      Right lower leg: No edema. Left lower leg: No edema. Skin:     Comments: No stasis changes. No wounds. No edema.   Prior large varicosity is no longer present; there are stab sites w/ steristrips, healing appropriately. There is a small <1cm blister at the medial knee (prob from dressing rubbing); minimal edema; there is some bruising on the anteromedial R tigh and around the stab sites, mainly on the thigh           I have reviewed and made appropriate changes to the review of systems input by the medical assistant. Vitals:    23 0902   BP: 118/68   BP Location: Left arm   Patient Position: Sitting   Cuff Size: Standard   Pulse: 92   Temp: 97.8 °F (36.6 °C)   TempSrc: Temporal   SpO2: 99%   Weight: 94.6 kg (208 lb 8 oz)   Height: 5' 8" (1.727 m)       Patient Active Problem List   Diagnosis   •  (spontaneous vaginal delivery)   • Hypothyroidism   • Overweight (BMI 25.0-29. 9)   • Class 1 obesity due to excess calories without serious comorbidity with body mass index (BMI) of 30.0 to 30.9 in adult   • Negative depression screening   • Annual physical exam   • Hypercholesterolemia   • Varicose veins of right lower extremity with pain   • Venous insufficiency of right lower extremity       Past Surgical History:   Procedure Laterality Date   • BUNIONECTOMY Left    • COLPOSCOPY     • CO ENDOVEN ABLTJ INCMPTNT VEIN XTR LASER 1ST VEIN Right 2023    Procedure: R GSV EVLT and stab phlebectomies x 39;  Surgeon: Genie Campbell MD;  Location: [facility] OR;  Service: Vascular   • WISDOM TOOTH EXTRACTION         Family History   Problem Relation Age of Onset   • Cancer Mother    • Breast cancer Mother 28   • BRCA1 Negative Mother    • BRCA2 Negative Mother    • Breast cancer Maternal Aunt 77   • No Known Problems Father    • No Known Problems Sister    • No Known Problems Maternal Grandmother    • No Known Problems Maternal Grandfather    • No Known Problems Paternal Grandmother    • No Known Problems Paternal Grandfather    • No Known Problems Maternal Aunt    • No Known Problems Maternal Aunt    • No Known Problems Paternal Aunt        Social History     Socioeconomic History   • Marital status: /Civil Union     Spouse name: Not on file   • Number of children: Not on file   • Years of education: Not on file   • Highest education level: Not on file   Occupational History   • Not on file   Tobacco Use   • Smoking status: Never   • Smokeless tobacco: Never   Vaping Use   • Vaping Use: Never used   Substance and Sexual Activity   • Alcohol use: Yes     Comment: social   • Drug use: No   • Sexual activity: Yes     Partners: Male     Birth control/protection: None   Other Topics Concern   • Not on file   Social History Narrative        1 child    Employed full time     Social Determinants of Health     Financial Resource Strain: Not on file   Food Insecurity: Not on file   Transportation Needs: Not on file   Physical Activity: Not on file   Stress: Not on file   Social Connections: Not on file   Intimate Partner Violence: Not on file   Housing Stability: Not on file       No Known Allergies      Current Outpatient Medications:   •  acetaminophen (TYLENOL) 325 mg tablet, Take 2 tablets by mouth every 4 (four) hours as needed for mild pain, Disp: 30 tablet, Rfl: 0  •  Cholecalciferol (VITAMIN D-3) 1000 units CAPS, Take 1 capsule by mouth daily  , Disp: , Rfl:   •  levothyroxine 200 mcg tablet, TAKE 1 TABLET DAILY EARLY IN THE MORNING, Disp: 90 tablet, Rfl: 3  •  multivitamin (THERAGRAN) TABS, Take 1 tablet by mouth daily, Disp: , Rfl:   •  oxyCODONE-acetaminophen (Percocet) 5-325 mg per tablet, Take 1 tablet by mouth every 6 (six) hours as needed for moderate pain for up to 10 days Max Daily Amount: 4 tablets, Disp: 15 tablet, Rfl: 0

## 2023-08-22 NOTE — PATIENT INSTRUCTIONS
1) Venous insufficiency  -please resume wearing compression; try to elevate your legs as much as possible during this time of healing  -you can restart exercise 2 weeks after surgery  -please go to your ultrasound appt today  -please try to transition to tylenol/motrin for pain control; no driving until at least 24 hrs after last dose of percocet    Try "[employer]Radar" or Yue[employer]ifang for compression. Measure your leg and fit yourself to the size guide for the brand you chose.   Look for "gradient compression"  15-20mmHg (lightweight) or 20-30mmHg (medium weight); knee high are fine but whatever makes you the most comfortable

## 2023-08-22 NOTE — LETTER
August 22, 2023     Patient: Milton Mazariegos  YOB: 1985  Date of Visit: 8/22/2023      To Whom it May Concern:    Darek [place] is under my professional care. Lino Bernstein was seen in my office on 8/22/2023. Lino Bernstein may return to work with limitations 8/23/23 if feeling ready. She must get up for short walks at least hourly and elevate her right leg as much as possible throughout the day. No heavy lifting (no more than 20lbs) or vigorous activity for 2 weeks. If you have any questions or concerns, please don't hesitate to call.          Sincerely,          Gui Campbell, MD        CC: No Recipients

## 2023-08-22 NOTE — TELEPHONE ENCOUNTER
Received call from [facility] vascular lab carbon. Pt is there for post EVLT RLE doppler and has EHIT II.  KATIE Luo PA-C informed of same. She ? If pt has ever taken anticoagulant previously, pt states she has not. She request pt begin Xarelto starter pack and repeat R LEV doppler in one week. Order placed for doppler and med, routed to KATIE Luo to sign. S/w pt and informed of plan, pt verbalized understanding, advised rx will be sent to [address] [place] [address] in [facility] per her request, will have  contact her to schedule f/u doppler, s/w [facility], she contacted pt and she is scheduled for 8/30/23.

## 2023-08-23 PROCEDURE — 93971 EXTREMITY STUDY: CPT | Performed by: SURGERY

## 2023-08-30 ENCOUNTER — HOSPITAL ENCOUNTER (OUTPATIENT)
Dept: NON INVASIVE DIAGNOSTICS | Facility: HOSPITAL | Age: 38
Discharge: HOME | End: 2023-08-30
Payer: COMMERCIAL

## 2023-08-30 DIAGNOSIS — I83.811 VARICOSE VEINS OF RIGHT LOWER EXTREMITY WITH PAIN: ICD-10-CM

## 2023-08-30 DIAGNOSIS — I82.401 ACUTE DEEP VEIN THROMBOSIS (DVT) OF RIGHT LOWER EXTREMITY, UNSPECIFIED VEIN (HCC): ICD-10-CM

## 2023-08-30 PROCEDURE — 93971 EXTREMITY STUDY: CPT

## 2023-09-01 ENCOUNTER — TELEPHONE (OUTPATIENT)
Dept: VASCULAR SURGERY | Facility: CLINIC | Age: 38
End: 2023-09-01

## 2023-09-01 DIAGNOSIS — I82.401 ACUTE DEEP VEIN THROMBOSIS (DVT) OF RIGHT LOWER EXTREMITY, UNSPECIFIED VEIN (HCC): Primary | ICD-10-CM

## 2023-09-01 NOTE — TELEPHONE ENCOUNTER
Patient called for doppler results from 8/30 to see if she needed to continue anticoagulation thru the weekend. Doppler had not been signed yet, so I sent dr. Desmond Sanchez on day float a message , and she read the doppler and said to stay on anticoagulation thru the weekend and Dr. Madhavi Fountain Doctor had flagged the study. We will set up a followup doppler for one week and I will task dr. Madhavi Fountain.     Meanwhile, patient will continue the [facility]

## 2023-09-03 PROCEDURE — 93971 EXTREMITY STUDY: CPT | Performed by: SURGERY

## 2023-09-07 ENCOUNTER — HOSPITAL ENCOUNTER (OUTPATIENT)
Dept: NON INVASIVE DIAGNOSTICS | Facility: CLINIC | Age: 38
Discharge: HOME | End: 2023-09-07
Payer: COMMERCIAL

## 2023-09-07 DIAGNOSIS — I82.401 ACUTE DEEP VEIN THROMBOSIS (DVT) OF RIGHT LOWER EXTREMITY, UNSPECIFIED VEIN (HCC): ICD-10-CM

## 2023-09-07 PROCEDURE — 93971 EXTREMITY STUDY: CPT

## 2023-09-08 PROCEDURE — 93971 EXTREMITY STUDY: CPT | Performed by: SURGERY

## 2023-09-18 ENCOUNTER — HOSPITAL ENCOUNTER (OUTPATIENT)
Dept: RADIOLOGY | Facility: HOSPITAL | Age: 38
Discharge: HOME | End: 2023-09-18
Attending: OBSTETRICS & GYNECOLOGY
Payer: COMMERCIAL

## 2023-09-18 DIAGNOSIS — Z91.89 OTHER SPECIFIED PERSONAL RISK FACTORS, NOT ELSEWHERE CLASSIFIED: ICD-10-CM

## 2023-09-18 PROCEDURE — C8937 CAD BREAST MRI: HCPCS

## 2023-09-18 PROCEDURE — A9585 GADOBUTROL INJECTION: HCPCS | Performed by: OBSTETRICS & GYNECOLOGY

## 2023-09-18 PROCEDURE — C8908 MRI W/O FOL W/CONT, BREAST,: HCPCS

## 2023-09-18 PROCEDURE — G1004 CDSM NDSC: HCPCS

## 2023-09-18 RX ORDER — GADOBUTROL 604.72 MG/ML
9 INJECTION INTRAVENOUS
Status: COMPLETED | OUTPATIENT
Start: 2023-09-18 | End: 2023-09-18

## 2023-09-18 RX ADMIN — GADOBUTROL 9 ML: 604.72 INJECTION INTRAVENOUS at 17:15

## 2023-10-31 ENCOUNTER — OFFICE VISIT (OUTPATIENT)
Dept: VASCULAR SURGERY | Facility: CLINIC | Age: 38
End: 2023-10-31

## 2023-10-31 VITALS
WEIGHT: 210.4 LBS | BODY MASS INDEX: 31.89 KG/M2 | HEIGHT: 68 IN | OXYGEN SATURATION: 99 % | SYSTOLIC BLOOD PRESSURE: 132 MMHG | DIASTOLIC BLOOD PRESSURE: 74 MMHG | HEART RATE: 68 BPM | TEMPERATURE: 97.2 F

## 2023-10-31 DIAGNOSIS — I87.2 VENOUS INSUFFICIENCY OF RIGHT LOWER EXTREMITY: ICD-10-CM

## 2023-10-31 DIAGNOSIS — I83.811 VARICOSE VEINS OF RIGHT LOWER EXTREMITY WITH PAIN: Primary | ICD-10-CM

## 2023-10-31 PROCEDURE — 99024 POSTOP FOLLOW-UP VISIT: CPT | Performed by: SURGERY

## 2023-10-31 NOTE — PROGRESS NOTES
Assessment/Plan:    Pt is a 35 yo F w/ hypothyroidism, obesity, HLD, venous insufficiency     Varicose veins of right lower extremity with pain  Venous insufficiency of right lower extremity    -painful large varicosity of the R medial thigh and leg; mild edema  -reviewed reflux study which shows significant reflux throughout the R GSV from inguinal to ankle levels; there is also mentino of deep reflux but does not list the level this occurs; I have messaged the vascular tech for more information regarding the results of this portion of the study; awaiting response  -now s/p R GSV EVLT and stab phlebectomies x 39 8/18/23  -had vagal with dressing removal  -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 female. Patient presents today for a 7 week follow up S/P R EVLT GSV with stab phlebectomies done 8/18/23 by DR. nIgrid Jacome Doctor. Had a KATHLEEN done 9/7/23. Pt reports numbness to top of right foot denies pain, swelling. Pt is taking Xarelto S/P acute DVT. Pt is a non smoker. HPI:    Patient presents for second POC after EVLT and stabs x 39 8/18/23. C/b EHITII    She notes varicose veins in the RLE. These are painful and bulging. Pain is worst in the popliteal fossa. She has some darkening at the R ankle. She has aching pain and itching. Symptoms are worst in the summer. Symptoms started around '15 and worsened with pregnancies ('15 and '17). She wore waist-high compression daily for 3 months. She does walking for exercise. Her weight is stable. She does not elevate her legs. 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 Denies hx of blood clots.   She has venous disease hx in her maternal grandmother. Now s/p EVLT. She is doing well. She had a mild vagal reaction with dressing removal in office. Has EHITII on initial DU and started on Xarelto which was taken for about 3 weeks total.  She has less pain, swelling, achniess, symptoms. She is wearing compression daily. She elevates occasionally. She exercises occasionally. The following portions of the patient's history were reviewed and updated as appropriate: allergies, current medications, past family history, past medical history, past social history, past surgical history, and problem list.    Review of Systems   Constitutional: Negative. HENT: Negative. Eyes: Negative. Respiratory: Negative. Negative for shortness of breath. Cardiovascular: Negative. Negative for chest pain and leg swelling. Gastrointestinal: Negative. Endocrine: Negative. Genitourinary: Negative. Musculoskeletal: Negative. Negative for gait problem. Skin: Negative. Negative for color change and wound. Allergic/Immunologic: Negative. Neurological:  Positive for numbness (tingling of the dorsal R foot). Negative for weakness. Hematological: Negative. Psychiatric/Behavioral: Negative. Objective:      /74 (BP Location: Left arm, Patient Position: Sitting, Cuff Size: Standard)   Pulse 68   Temp (!) 97.2 °F (36.2 °C) (Tympanic)   Ht 5' 8" (1.727 m)   Wt 95.4 kg (210 lb 6.4 oz)   SpO2 99%   BMI 31.99 kg/m²          Physical Exam  Cardiovascular:      Pulses:           Radial pulses are 2+ on the right side and 2+ on the left side. Dorsalis pedis pulses are 2+ on the right side and 2+ on the left side. Posterior tibial pulses are 0 on the right side and 2+ on the left side. Heart sounds: No murmur heard. Pulmonary:      Effort: No respiratory distress. Breath sounds: No wheezing or rales. Musculoskeletal:      Right lower leg: No edema.       Left lower leg: No edema. Skin:     Comments: No stasis changes. No wounds. No edema. Prior large varicosity is no longer present; stab sites well healed; minimal edema;   Mild paresthesias on the dorsal R foot, not extending proximal to the ankle           I have reviewed and made appropriate changes to the review of systems input by the medical assistant. Vitals:    10/31/23 1037   BP: 132/74   BP Location: Left arm   Patient Position: Sitting   Cuff Size: Standard   Pulse: 68   Temp: (!) 97.2 °F (36.2 °C)   TempSrc: Tympanic   SpO2: 99%   Weight: 95.4 kg (210 lb 6.4 oz)   Height: 5' 8" (1.727 m)       Patient Active Problem List   Diagnosis   •  (spontaneous vaginal delivery)   • Hypothyroidism   • Overweight (BMI 25.0-29. 9)   • Class 1 obesity due to excess calories without serious comorbidity with body mass index (BMI) of 30.0 to 30.9 in adult   • Negative depression screening   • Annual physical exam   • Hypercholesterolemia   • Varicose veins of right lower extremity with pain   • Venous insufficiency of right lower extremity       Past Surgical History:   Procedure Laterality Date   • BUNIONECTOMY Left    • COLPOSCOPY     • [place] ENDOVEN ABLTJ INCMPTNT VEIN XTR LASER 1ST VEIN Right 2023    Procedure: R GSV EVLT and stab phlebectomies x 39;  Surgeon: Jade Campbell MD;  Location: [facility];  Service: Vascular   • WISDOM TOOTH EXTRACTION         Family History   Problem Relation Age of Onset   • Cancer Mother    • Breast cancer Mother 28   • BRCA1 Negative Mother    • BRCA2 Negative Mother    • Breast cancer Maternal Aunt 77   • No Known Problems Father    • No Known Problems Sister    • No Known Problems Maternal Grandmother    • No Known Problems Maternal Grandfather    • No Known Problems Paternal Grandmother    • No Known Problems Paternal Grandfather    • No Known Problems Maternal Aunt    • No Known Problems Maternal Aunt    • No Known Problems Paternal Aunt        Social History Socioeconomic History   • Marital status: /Civil Union     Spouse name: Not on file   • Number of children: Not on file   • Years of education: Not on file   • Highest education level: Not on file   Occupational History   • Not on file   Tobacco Use   • Smoking status: Never   • Smokeless tobacco: Never   Vaping Use   • Vaping Use: Never used   Substance and Sexual Activity   • Alcohol use: Yes     Comment: social   • Drug use: No   • Sexual activity: Yes     Partners: Male     Birth control/protection: None   Other Topics Concern   • Not on file   Social History Narrative        1 child    Employed full time     Social Determinants of Health     Financial Resource Strain: Not on file   Food Insecurity: Not on file   Transportation Needs: Not on file   Physical Activity: Not on file   Stress: Not on file   Social Connections: Not on file   Intimate Partner Violence: Not on file   Housing Stability: Not on file       No Known Allergies      Current Outpatient Medications:   •  acetaminophen (TYLENOL) 325 mg tablet, Take 2 tablets by mouth every 4 (four) hours as needed for mild pain, Disp: 30 tablet, Rfl: 0  •  Cholecalciferol (VITAMIN D-3) 1000 units CAPS, Take 1 capsule by mouth daily  , Disp: , Rfl:   •  levothyroxine 200 mcg tablet, TAKE 1 TABLET DAILY EARLY IN THE MORNING, Disp: 90 tablet, Rfl: 3  •  multivitamin (THERAGRAN) TABS, Take 1 tablet by mouth daily, Disp: , Rfl:   •  rivaroxaban (XARELTO) 15 & 20 MG starter pack, Take one 15 mg tablet with food twice daily for 21 days. Beginning day 22 take one 20mg tablet w/ food once daily. , Disp: 51 each, Rfl: 0

## 2023-12-07 ENCOUNTER — RA CDI HCC (OUTPATIENT)
Dept: OTHER | Facility: HOSPITAL | Age: 38
End: 2023-12-07

## 2023-12-07 NOTE — PROGRESS NOTES
[address] [facility] coding opportunities       Chart reviewed, no opportunity found: CHART REVIEWED, NO OPPORTUNITY FOUND        Patients Insurance        Commercial Insurance: [employer]

## 2023-12-14 ENCOUNTER — TELEPHONE (OUTPATIENT)
Dept: FAMILY MEDICINE CLINIC | Facility: CLINIC | Age: 38
End: 2023-12-14

## 2023-12-14 NOTE — TELEPHONE ENCOUNTER
Hi, my name is [employer]. My YOB: 1985. I have an appointment with Doctor Terese Montoya on Monday, December 18th that I would like to have rescheduled. I'd like to have it rescheduled for after the new Year. If you could, please return my call at [phone]. Thank you. Left voicemail to reschedule patient.

## 2024-01-04 ENCOUNTER — OFFICE VISIT (OUTPATIENT)
Dept: FAMILY MEDICINE CLINIC | Facility: CLINIC | Age: 39
End: 2024-01-04
Payer: COMMERCIAL

## 2024-01-04 VITALS
OXYGEN SATURATION: 98 % | TEMPERATURE: 97.5 F | SYSTOLIC BLOOD PRESSURE: 118 MMHG | WEIGHT: 211.25 LBS | DIASTOLIC BLOOD PRESSURE: 62 MMHG | HEART RATE: 99 BPM | BODY MASS INDEX: 32.02 KG/M2 | HEIGHT: 68 IN

## 2024-01-04 DIAGNOSIS — J01.00 ACUTE NON-RECURRENT MAXILLARY SINUSITIS: Primary | ICD-10-CM

## 2024-01-04 PROCEDURE — 99213 OFFICE O/P EST LOW 20 MIN: CPT

## 2024-01-04 RX ORDER — AMOXICILLIN 500 MG/1
500 CAPSULE ORAL EVERY 12 HOURS SCHEDULED
Qty: 20 CAPSULE | Refills: 0 | Status: SHIPPED | OUTPATIENT
Start: 2024-01-04 | End: 2024-01-14

## 2024-01-04 NOTE — PROGRESS NOTES
Name: Sydnie Peters      : 1985      MRN: [id number]  Encounter Provider: NEGAR Narayanan  Encounter Date: 2024   Encounter department: [facility]    Assessment & Plan     1. Acute non-recurrent maxillary sinusitis  -     amoxicillin (AMOXIL) 500 mg capsule; Take 1 capsule (500 mg total) by mouth every 12 (twelve) hours for 10 days    Discussed OTC treatments & supportive care.  Follow up with our office if symptoms fail to improve or worsen.  Discussed ER precautions with patient.       Subjective      Sydnie presents in office today for URI symptoms x 3-5 days. She complains of sinus pressure, congestion & headache. Denies CP or SOB. No fevers. +sick contacts at home. Mucinex OTC. Denies recent travel.      Review of Systems   Constitutional:  Negative for chills, fatigue and fever.   HENT:  Positive for congestion and sinus pressure. Negative for ear pain, facial swelling, hearing loss, rhinorrhea, sneezing, sore throat and trouble swallowing.    Eyes:  Negative for pain, redness and visual disturbance.   Respiratory:  Negative for cough, chest tightness, shortness of breath and wheezing.    Cardiovascular:  Negative for chest pain and palpitations.   Gastrointestinal:  Negative for abdominal pain, diarrhea, nausea and vomiting.   Genitourinary:  Negative for dysuria, flank pain, hematuria and pelvic pain.   Musculoskeletal:  Negative for arthralgias, back pain and myalgias.   Skin:  Negative for color change and rash.   Neurological:  Negative for dizziness, seizures, syncope, weakness, light-headedness and headaches.   Psychiatric/Behavioral:  Negative for confusion, hallucinations and sleep disturbance. The patient is not nervous/anxious.    All other systems reviewed and are negative.      Current Outpatient Medications on File Prior to Visit   Medication Sig    acetaminophen (TYLENOL) 325 mg tablet Take 2 tablets by mouth every 4 (four) hours as needed  "for mild pain    Cholecalciferol (VITAMIN D-3) 1000 units CAPS Take 1 capsule by mouth daily      levothyroxine 200 mcg tablet TAKE 1 TABLET DAILY EARLY IN THE MORNING    multivitamin (THERAGRAN) TABS Take 1 tablet by mouth daily    rivaroxaban (XARELTO) 15 & 20 MG starter pack Take one 15 mg tablet with food twice daily for 21 days.  Beginning day 22 take one 20mg tablet w/ food once daily.       Objective     /62   Pulse 99   Temp 97.5 °F (36.4 °C)   Ht 5' 8\" (1.727 m)   Wt 95.8 kg (211 lb 4 oz)   SpO2 98%   BMI 32.12 kg/m²     Physical Exam  Vitals reviewed.   Constitutional:       General: She is awake. She is not in acute distress.     Appearance: Normal appearance. She is well-developed. She is ill-appearing. She is not toxic-appearing.   HENT:      Head: Normocephalic.      Jaw: There is normal jaw occlusion.      Right Ear: A middle ear effusion is present. Tympanic membrane is not erythematous or bulging.      Left Ear: Tympanic membrane normal. Tympanic membrane is not erythematous or bulging.      Nose: Congestion present. No rhinorrhea.      Right Sinus: Maxillary sinus tenderness present. No frontal sinus tenderness.      Left Sinus: Maxillary sinus tenderness present. No frontal sinus tenderness.      Mouth/Throat:      Pharynx: Uvula midline. Posterior oropharyngeal erythema present. No oropharyngeal exudate or uvula swelling.      Tonsils: No tonsillar exudate or tonsillar abscesses.   Eyes:      Extraocular Movements: Extraocular movements intact.      Conjunctiva/sclera: Conjunctivae normal.      Pupils: Pupils are equal, round, and reactive to light.   Neck:      Thyroid: No thyroid mass.      Vascular: No JVD.      Trachea: Trachea and phonation normal.   Cardiovascular:      Rate and Rhythm: Normal rate and regular rhythm.      Pulses: Normal pulses.      Heart sounds: Normal heart sounds.   Pulmonary:      Effort: Pulmonary effort is normal. No tachypnea or respiratory distress.     "  Breath sounds: Normal breath sounds and air entry. No decreased breath sounds, wheezing, rhonchi or rales.      Comments: CTAB  Chest:      Chest wall: No tenderness.   Abdominal:      General: Bowel sounds are normal. There is no distension.      Palpations: Abdomen is soft.      Tenderness: There is no abdominal tenderness. There is no right CVA tenderness, left CVA tenderness, guarding or rebound.   Musculoskeletal:         General: Normal range of motion.      Cervical back: Normal range of motion.      Right lower leg: No edema.      Left lower leg: No edema.   Lymphadenopathy:      Cervical: No cervical adenopathy.   Skin:     General: Skin is warm and dry.      Findings: No rash.   Neurological:      General: No focal deficit present.      Mental Status: She is alert and oriented to person, place, and time.      Sensory: Sensation is intact.      Motor: Motor function is intact.      Coordination: Coordination is intact.      Gait: Gait is intact.      Deep Tendon Reflexes: Reflexes are normal and symmetric.   Psychiatric:         Attention and Perception: Attention normal.         Mood and Affect: Mood normal.         Speech: Speech normal.         Behavior: Behavior normal. Behavior is cooperative.         Cognition and Memory: Cognition normal.       NEGAR Narayanan

## 2024-01-13 ENCOUNTER — APPOINTMENT (OUTPATIENT)
Dept: LAB | Facility: CLINIC | Age: 39
End: 2024-01-13
Payer: COMMERCIAL

## 2024-01-13 LAB
ALBUMIN SERPL BCP-MCNC: 4 G/DL (ref 3.5–5)
ALP SERPL-CCNC: 59 U/L (ref 34–104)
ALT SERPL W P-5'-P-CCNC: 21 U/L (ref 7–52)
ANION GAP SERPL CALCULATED.3IONS-SCNC: 6 MMOL/L
AST SERPL W P-5'-P-CCNC: 18 U/L (ref 13–39)
BASOPHILS # BLD AUTO: 0.05 THOUSANDS/ÂΜL (ref 0–0.1)
BASOPHILS NFR BLD AUTO: 1 % (ref 0–1)
BILIRUB SERPL-MCNC: 0.52 MG/DL (ref 0.2–1)
BUN SERPL-MCNC: 10 MG/DL (ref 5–25)
CALCIUM SERPL-MCNC: 9.6 MG/DL (ref 8.4–10.2)
CHLORIDE SERPL-SCNC: 104 MMOL/L (ref 96–108)
CHOLEST SERPL-MCNC: 164 MG/DL
CO2 SERPL-SCNC: 30 MMOL/L (ref 21–32)
CREAT SERPL-MCNC: 0.73 MG/DL (ref 0.6–1.3)
EOSINOPHIL # BLD AUTO: 0.26 THOUSAND/ÂΜL (ref 0–0.61)
EOSINOPHIL NFR BLD AUTO: 4 % (ref 0–6)
ERYTHROCYTE [DISTWIDTH] IN BLOOD BY AUTOMATED COUNT: 13.2 % (ref 11.6–15.1)
GFR SERPL CREATININE-BSD FRML MDRD: 103 ML/MIN/1.73SQ M
GLUCOSE P FAST SERPL-MCNC: 93 MG/DL (ref 65–99)
HCT VFR BLD AUTO: 40.5 % (ref 34.8–46.1)
HDLC SERPL-MCNC: 47 MG/DL
HGB BLD-MCNC: 13.5 G/DL (ref 11.5–15.4)
IMM GRANULOCYTES # BLD AUTO: 0.04 THOUSAND/UL (ref 0–0.2)
IMM GRANULOCYTES NFR BLD AUTO: 1 % (ref 0–2)
LDLC SERPL CALC-MCNC: 92 MG/DL (ref 0–100)
LYMPHOCYTES # BLD AUTO: 3.29 THOUSANDS/ÂΜL (ref 0.6–4.47)
LYMPHOCYTES NFR BLD AUTO: 44 % (ref 14–44)
MCH RBC QN AUTO: 27.9 PG (ref 26.8–34.3)
MCHC RBC AUTO-ENTMCNC: 33.3 G/DL (ref 31.4–37.4)
MCV RBC AUTO: 84 FL (ref 82–98)
MONOCYTES # BLD AUTO: 0.48 THOUSAND/ÂΜL (ref 0.17–1.22)
MONOCYTES NFR BLD AUTO: 7 % (ref 4–12)
NEUTROPHILS # BLD AUTO: 3.05 THOUSANDS/ÂΜL (ref 1.85–7.62)
NEUTS SEG NFR BLD AUTO: 43 % (ref 43–75)
NRBC BLD AUTO-RTO: 0 /100 WBCS
PLATELET # BLD AUTO: 287 THOUSANDS/UL (ref 149–390)
PMV BLD AUTO: 9.9 FL (ref 8.9–12.7)
POTASSIUM SERPL-SCNC: 4.4 MMOL/L (ref 3.5–5.3)
PROT SERPL-MCNC: 6.9 G/DL (ref 6.4–8.4)
RBC # BLD AUTO: 4.84 MILLION/UL (ref 3.81–5.12)
SODIUM SERPL-SCNC: 140 MMOL/L (ref 135–147)
TRIGL SERPL-MCNC: 124 MG/DL
TSH SERPL DL<=0.05 MIU/L-ACNC: 3.15 UIU/ML (ref 0.45–4.5)
WBC # BLD AUTO: 7.17 THOUSAND/UL (ref 4.31–10.16)

## 2024-01-15 ENCOUNTER — OFFICE VISIT (OUTPATIENT)
Dept: FAMILY MEDICINE CLINIC | Facility: CLINIC | Age: 39
End: 2024-01-15
Payer: COMMERCIAL

## 2024-01-15 VITALS
WEIGHT: 213.6 LBS | OXYGEN SATURATION: 98 % | HEART RATE: 82 BPM | HEIGHT: 68 IN | BODY MASS INDEX: 32.37 KG/M2 | TEMPERATURE: 96.8 F | SYSTOLIC BLOOD PRESSURE: 110 MMHG | DIASTOLIC BLOOD PRESSURE: 78 MMHG

## 2024-01-15 DIAGNOSIS — Z23 ENCOUNTER FOR IMMUNIZATION: ICD-10-CM

## 2024-01-15 DIAGNOSIS — Z00.00 ANNUAL PHYSICAL EXAM: Primary | ICD-10-CM

## 2024-01-15 DIAGNOSIS — E03.9 ACQUIRED HYPOTHYROIDISM: ICD-10-CM

## 2024-01-15 DIAGNOSIS — F41.9 ANXIETY: ICD-10-CM

## 2024-01-15 DIAGNOSIS — I83.811 VARICOSE VEINS OF RIGHT LOWER EXTREMITY WITH PAIN: ICD-10-CM

## 2024-01-15 DIAGNOSIS — I87.2 VENOUS INSUFFICIENCY OF RIGHT LOWER EXTREMITY: ICD-10-CM

## 2024-01-15 PROCEDURE — 99396 PREV VISIT EST AGE 40-64: CPT

## 2024-01-15 RX ORDER — HYDROXYZINE HYDROCHLORIDE 25 MG/1
25 TABLET, FILM COATED ORAL EVERY 6 HOURS PRN
Qty: 30 TABLET | Refills: 0 | Status: SHIPPED | OUTPATIENT
Start: 2024-01-15

## 2024-01-15 RX ORDER — LEVOTHYROXINE SODIUM 0.2 MG/1
200 TABLET ORAL
Qty: 90 TABLET | Refills: 3 | Status: SHIPPED | OUTPATIENT
Start: 2024-01-15

## 2024-01-15 RX ORDER — MAGNESIUM 30 MG
30 TABLET ORAL 2 TIMES DAILY
COMMUNITY

## 2024-01-15 NOTE — PROGRESS NOTES
ADULT ANNUAL PHYSICAL  [facility]    NAME: Sydnie Peters  AGE: 38 y.o. SEX: female  : 1985     DATE: 1/15/2024     Assessment and Plan:     Problem List Items Addressed This Visit          Endocrine    Hypothyroidism    Relevant Medications    levothyroxine 200 mcg tablet    Other Relevant Orders    TSH, 3rd generation    T4, free       Cardiovascular and Mediastinum    Varicose veins of right lower extremity with pain    Venous insufficiency of right lower extremity       Other    Annual physical exam - Primary    Anxiety    Relevant Medications    hydrOXYzine HCL (ATARAX) 25 mg tablet     Other Visit Diagnoses       Encounter for immunization            F/u in 3 weeks on anxiety.  Recheck thyroid function q6 months.    Immunizations and preventive care screenings were discussed with patient today. Appropriate education was printed on patient's after visit summary.    Counseling:  Alcohol/drug use: discussed moderation in alcohol intake, the recommendations for healthy alcohol use, and avoidance of illicit drug use.  Dental Health: discussed importance of regular tooth brushing, flossing, and dental visits.  Injury prevention: discussed safety/seat belts, safety helmets, smoke detectors, carbon dioxide detectors, and smoking near bedding or upholstery.  Sexual health: discussed sexually transmitted diseases, partner selection, use of condoms, avoidance of unintended pregnancy, and contraceptive alternatives.  Exercise: the importance of regular exercise/physical activity was discussed. Recommend exercise 3-5 times per week for at least 30 minutes.       Depression Screening and Follow-up Plan: Patient was screened for depression during today's encounter. They screened negative with a PHQ-2 score of 0.        Return in about 3 weeks (around 2024).     Chief Complaint:     Chief Complaint   Patient presents with    Physical Exam      Annual. Concerns with anxiety; having panic attacks more frequently.      History of Present Illness:     Adult Annual Physical   Patient here for a comprehensive physical exam. The patient reports problems - anxiety/panic attacks .    Reports this has been a problem for several years but panic attacks used to be infrequent - once every 2 to 3 months.    States over the last year they have become more frequent. Feels SOB.  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 States her panic attack causes her to cry and she typically goes outside for fresh air to calm down.    Saw therapist several years ago.    Denies depressive symptoms, mood changes, SI or HI.    Diet and Physical Activity  Diet/Nutrition: well balanced diet.   Exercise: no formal exercise.      Depression Screening  PHQ-2/9 Depression Screening    Little interest or pleasure in doing things: 0 - not at all  Feeling down, depressed, or hopeless: 0 - not at all  PHQ-2 Score: 0  PHQ-2 Interpretation: Negative depression screen       General Health  Sleep: sleeps well.   Hearing: normal - bilateral.  Vision: no vision problems and goes for regular eye exams.   Dental: regular dental visits.       /GYN Health  Follows with gynecology? yes   Last menstrual period: 1/5/2024  Contraceptive method: none    Advanced Care Planning  Do you have an advanced directive? no  Do you have a durable medical power of ? no     Review of Systems:     Review of Systems   Constitutional:  Negative for chills, fatigue and fever.   HENT:  Negative for congestion, ear pain, facial swelling, hearing loss, rhinorrhea, sinus pressure, sneezing, sore throat and trouble swallowing.    Eyes:  Negative for pain, redness and visual disturbance.   Respiratory:  Negative for cough, chest tightness, shortness of breath and  wheezing.    Cardiovascular:  Negative for chest pain and palpitations.   Gastrointestinal:  Negative for abdominal pain, diarrhea, nausea and vomiting.   Genitourinary:  Negative for dysuria, flank pain, hematuria and pelvic pain.   Musculoskeletal:  Negative for arthralgias, back pain and myalgias.   Skin:  Negative for color change and rash.   Neurological:  Negative for dizziness, seizures, syncope, weakness, light-headedness and headaches.   Psychiatric/Behavioral:  Negative for confusion, hallucinations and sleep disturbance. The patient is nervous/anxious.    All other systems reviewed and are negative.     Past Medical History:     Past Medical History:   Diagnosis Date    Abnormal Pap smear of cervix     Anxiety     Disease of thyroid gland     Exercise involving walking     Female infertility     IUI pregnancy    History of COVID-19 09/2021    recovered at home    Right knee injury 2019    Varicella     Varicose vein of leg       Past Surgical History:     Past Surgical History:   Procedure Laterality Date    BUNIONECTOMY Left     COLPOSCOPY      AR ENDOVEN ABLTJ INCMPTNT VEIN XTR LASER 1ST VEIN Right 8/18/2023    Procedure: R GSV EVLT and stab phlebectomies x 39;  Surgeon: Mitzi Campbell MD;  Location: [facility] OR;  Service: Vascular    WISDOM TOOTH EXTRACTION        Social History:     Social History     Socioeconomic History    Marital status: /Civil Union     Spouse name: None    Number of children: None    Years of education: None    Highest education level: None   Occupational History    None   Tobacco Use    Smoking status: Never    Smokeless tobacco: Never   Vaping Use    Vaping status: Never Used   Substance and Sexual Activity    Alcohol use: Yes     Comment: social    Drug use: No    Sexual activity: Yes     Partners: Male     Birth control/protection: None   Other Topics Concern    None   Social History Narrative        1 child    Employed full time     Social Determinants of  "Health     Financial Resource Strain: Not on file   Food Insecurity: Not on file   Transportation Needs: Not on file   Physical Activity: Not on file   Stress: Not on file   Social Connections: Not on file   Intimate Partner Violence: Not on file   Housing Stability: Not on file      Family History:     Family History   Problem Relation Age of Onset    Cancer Mother     Breast cancer Mother 32    BRCA1 Negative Mother     BRCA2 Negative Mother     Breast cancer Maternal Aunt 66    No Known Problems Father     No Known Problems Sister     No Known Problems Maternal Grandmother     No Known Problems Maternal Grandfather     No Known Problems Paternal Grandmother     No Known Problems Paternal Grandfather     No Known Problems Maternal Aunt     No Known Problems Maternal Aunt     No Known Problems Paternal Aunt       Current Medications:     Current Outpatient Medications   Medication Sig Dispense Refill    acetaminophen (TYLENOL) 325 mg tablet Take 2 tablets by mouth every 4 (four) hours as needed for mild pain 30 tablet 0    Cholecalciferol (VITAMIN D-3) 1000 units CAPS Take 1 capsule by mouth daily        hydrOXYzine HCL (ATARAX) 25 mg tablet Take 1 tablet (25 mg total) by mouth every 6 (six) hours as needed for itching 30 tablet 0    levothyroxine 200 mcg tablet Take 1 tablet (200 mcg total) by mouth daily in the early morning 90 tablet 3    magnesium 30 MG tablet Take 30 mg by mouth 2 (two) times a day      multivitamin (THERAGRAN) TABS Take 1 tablet by mouth daily       No current facility-administered medications for this visit.      Allergies:     No Known Allergies   Physical Exam:     /78 (BP Location: Left arm, Patient Position: Sitting)   Pulse 82   Temp (!) 96.8 °F (36 °C) (Tympanic)   Ht 5' 8\" (1.727 m)   Wt 96.9 kg (213 lb 9.6 oz)   LMP 01/05/2024 (Exact Date)   SpO2 98%   BMI 32.48 kg/m²     Physical Exam  Vitals and nursing note reviewed.   Constitutional:       General: She is not in " acute distress.     Appearance: She is well-developed. She is obese.   HENT:      Head: Normocephalic and atraumatic.      Right Ear: Hearing and tympanic membrane normal.      Left Ear: Hearing and tympanic membrane normal.      Nose: Nose normal.      Mouth/Throat:      Mouth: Mucous membranes are moist.      Dentition: Normal dentition.      Tongue: No lesions.      Pharynx: Oropharynx is clear. Uvula midline. No oropharyngeal exudate.      Tonsils: No tonsillar exudate.   Eyes:      Extraocular Movements: Extraocular movements intact.      Conjunctiva/sclera: Conjunctivae normal.   Neck:      Vascular: No carotid bruit or JVD.   Cardiovascular:      Rate and Rhythm: Normal rate and regular rhythm.      Heart sounds: S1 normal and S2 normal. No murmur heard.  Pulmonary:      Effort: Pulmonary effort is normal. No tachypnea or respiratory distress.      Breath sounds: Normal breath sounds and air entry. No decreased breath sounds.   Chest:      Chest wall: No deformity or tenderness.   Abdominal:      General: Abdomen is flat. Bowel sounds are normal. There is no distension.      Palpations: Abdomen is soft.      Tenderness: There is no abdominal tenderness. There is no right CVA tenderness, left CVA tenderness or guarding.   Musculoskeletal:         General: No swelling.      Cervical back: Full passive range of motion without pain and neck supple.      Right lower leg: No edema.      Left lower leg: No edema.   Lymphadenopathy:      Cervical: No cervical adenopathy.   Skin:     General: Skin is warm and dry.      Capillary Refill: Capillary refill takes less than 2 seconds.      Findings: No rash.   Neurological:      Mental Status: She is alert and oriented to person, place, and time.      Cranial Nerves: Cranial nerves 2-12 are intact.      Sensory: Sensation is intact.      Motor: Motor function is intact.      Coordination: Coordination is intact.      Gait: Gait is intact.   Psychiatric:         Mood and  Affect: Mood normal.         Behavior: Behavior is cooperative.          NEGAR Narayanan  [facility]

## 2024-02-16 ENCOUNTER — OFFICE VISIT (OUTPATIENT)
Dept: FAMILY MEDICINE CLINIC | Facility: CLINIC | Age: 39
End: 2024-02-16
Payer: COMMERCIAL

## 2024-02-16 VITALS
OXYGEN SATURATION: 99 % | SYSTOLIC BLOOD PRESSURE: 116 MMHG | DIASTOLIC BLOOD PRESSURE: 70 MMHG | TEMPERATURE: 99.1 F | HEART RATE: 85 BPM | WEIGHT: 214.4 LBS | BODY MASS INDEX: 32.49 KG/M2 | HEIGHT: 68 IN

## 2024-02-16 DIAGNOSIS — E03.9 ACQUIRED HYPOTHYROIDISM: ICD-10-CM

## 2024-02-16 DIAGNOSIS — F41.9 ANXIETY: Primary | ICD-10-CM

## 2024-02-16 PROCEDURE — 99213 OFFICE O/P EST LOW 20 MIN: CPT

## 2024-02-16 NOTE — PROGRESS NOTES
Name: Sydnie Peters      : 1985      MRN: [id number]  Encounter Provider: NEGAR Narayanan  Encounter Date: 2024   Encounter department: [facility]    Assessment & Plan     1. Anxiety    2. Acquired hypothyroidism    Pt would like to continue Atarax PRN for now. Discussed daily medication and options to change PRN meds - pt declined.  F/u in 6 mo for anxiety recheck & thyroid review - labs in place.       Subjective      Sydnie presents in office today for f/u on anxiety. Took Atarax a few times - reports feeling better. States it made her a little drowsy but she was able to function and perform normal tasks. +stress with work - states this time of year with snow days causes her a lot of anxiety. Denies CP or SOB. Denies palpitations.      Review of Systems   Constitutional:  Negative for chills, fatigue and fever.   HENT:  Negative for congestion, ear pain, facial swelling, hearing loss, rhinorrhea, sinus pressure, sneezing, sore throat and trouble swallowing.    Eyes:  Negative for pain, redness and visual disturbance.   Respiratory:  Negative for cough, chest tightness, shortness of breath and wheezing.    Cardiovascular:  Negative for chest pain and palpitations.   Gastrointestinal:  Negative for abdominal pain, diarrhea, nausea and vomiting.   Genitourinary:  Negative for dysuria, flank pain, hematuria and pelvic pain.   Musculoskeletal:  Negative for arthralgias, back pain and myalgias.   Skin:  Negative for color change and rash.   Neurological:  Negative for dizziness, seizures, syncope, weakness, light-headedness and headaches.   Psychiatric/Behavioral:  Negative for confusion, hallucinations and sleep disturbance. The patient is nervous/anxious.    All other systems reviewed and are negative.      Current Outpatient Medications on File Prior to Visit   Medication Sig    acetaminophen (TYLENOL) 325 mg tablet Take 2 tablets by mouth every 4 (four)  "hours as needed for mild pain    Cholecalciferol (VITAMIN D-3) 1000 units CAPS Take 1 capsule by mouth daily      hydrOXYzine HCL (ATARAX) 25 mg tablet Take 1 tablet (25 mg total) by mouth every 6 (six) hours as needed for itching    levothyroxine 200 mcg tablet Take 1 tablet (200 mcg total) by mouth daily in the early morning    magnesium 30 MG tablet Take 30 mg by mouth 2 (two) times a day    multivitamin (THERAGRAN) TABS Take 1 tablet by mouth daily       Objective     /70 (BP Location: Left arm, Patient Position: Sitting)   Pulse 85   Temp 99.1 °F (37.3 °C) (Tympanic)   Ht 5' 8\" (1.727 m)   Wt 97.3 kg (214 lb 6.4 oz)   LMP 02/05/2024 (Approximate)   SpO2 99%   BMI 32.60 kg/m²     Physical Exam  Vitals reviewed.   Constitutional:       General: She is awake. She is not in acute distress.     Appearance: Normal appearance. She is well-developed. She is not toxic-appearing.   HENT:      Head: Normocephalic.      Jaw: There is normal jaw occlusion.      Right Ear: Tympanic membrane normal. Tympanic membrane is not erythematous or bulging.      Left Ear: Tympanic membrane normal. Tympanic membrane is not erythematous or bulging.      Nose: No congestion or rhinorrhea.      Right Sinus: No maxillary sinus tenderness or frontal sinus tenderness.      Left Sinus: No maxillary sinus tenderness or frontal sinus tenderness.      Mouth/Throat:      Pharynx: Uvula midline. No oropharyngeal exudate, posterior oropharyngeal erythema or uvula swelling.      Tonsils: No tonsillar exudate or tonsillar abscesses.   Eyes:      Extraocular Movements: Extraocular movements intact.      Conjunctiva/sclera: Conjunctivae normal.      Pupils: Pupils are equal, round, and reactive to light.   Neck:      Thyroid: No thyroid mass.      Vascular: No JVD.      Trachea: Trachea and phonation normal.   Cardiovascular:      Rate and Rhythm: Normal rate and regular rhythm.      Pulses: Normal pulses.      Heart sounds: Normal heart " sounds.   Pulmonary:      Effort: Pulmonary effort is normal. No tachypnea or respiratory distress.      Breath sounds: Normal breath sounds and air entry. No decreased breath sounds, wheezing, rhonchi or rales.   Chest:      Chest wall: No tenderness.   Abdominal:      General: Bowel sounds are normal. There is no distension.      Palpations: Abdomen is soft.      Tenderness: There is no abdominal tenderness. There is no right CVA tenderness, left CVA tenderness, guarding or rebound.   Musculoskeletal:         General: Normal range of motion.      Cervical back: Normal range of motion.      Right lower leg: No edema.      Left lower leg: No edema.   Lymphadenopathy:      Cervical: No cervical adenopathy.   Skin:     General: Skin is warm and dry.      Findings: No rash.   Neurological:      General: No focal deficit present.      Mental Status: She is alert and oriented to person, place, and time.      Sensory: Sensation is intact.      Motor: Motor function is intact.      Coordination: Coordination is intact.      Gait: Gait is intact.      Deep Tendon Reflexes: Reflexes are normal and symmetric.   Psychiatric:         Attention and Perception: Attention normal.         Mood and Affect: Mood normal.         Speech: Speech normal.         Behavior: Behavior normal. Behavior is cooperative.         Cognition and Memory: Cognition normal.       NEGAR Narayanan

## 2024-03-04 ENCOUNTER — HOSPITAL ENCOUNTER (OUTPATIENT)
Dept: MAMMOGRAPHY | Facility: MEDICAL CENTER | Age: 39
Discharge: HOME | End: 2024-03-04
Payer: COMMERCIAL

## 2024-03-04 VITALS — WEIGHT: 214 LBS | HEIGHT: 68 IN | BODY MASS INDEX: 32.43 KG/M2

## 2024-03-04 DIAGNOSIS — Z12.31 SCREENING MAMMOGRAM FOR HIGH-RISK PATIENT: ICD-10-CM

## 2024-03-04 PROCEDURE — 77063 BREAST TOMOSYNTHESIS BI: CPT

## 2024-03-04 PROCEDURE — 77067 SCR MAMMO BI INCL CAD: CPT

## 2024-07-23 ENCOUNTER — TELEPHONE (OUTPATIENT)
Dept: FAMILY MEDICINE CLINIC | Facility: CLINIC | Age: 39
End: 2024-07-23

## 2024-07-23 ENCOUNTER — OFFICE VISIT (OUTPATIENT)
Dept: FAMILY MEDICINE CLINIC | Facility: CLINIC | Age: 39
End: 2024-07-23
Payer: COMMERCIAL

## 2024-07-23 ENCOUNTER — APPOINTMENT (OUTPATIENT)
Dept: LAB | Facility: CLINIC | Age: 39
End: 2024-07-23
Payer: COMMERCIAL

## 2024-07-23 VITALS
TEMPERATURE: 97.5 F | SYSTOLIC BLOOD PRESSURE: 120 MMHG | HEIGHT: 68 IN | DIASTOLIC BLOOD PRESSURE: 70 MMHG | BODY MASS INDEX: 32.58 KG/M2 | WEIGHT: 215 LBS | OXYGEN SATURATION: 98 % | HEART RATE: 86 BPM

## 2024-07-23 DIAGNOSIS — E03.9 ACQUIRED HYPOTHYROIDISM: ICD-10-CM

## 2024-07-23 DIAGNOSIS — Z23 ENCOUNTER FOR IMMUNIZATION: ICD-10-CM

## 2024-07-23 DIAGNOSIS — F41.9 ANXIETY: Primary | ICD-10-CM

## 2024-07-23 DIAGNOSIS — E03.9 ACQUIRED HYPOTHYROIDISM: Primary | ICD-10-CM

## 2024-07-23 LAB
T4 FREE SERPL-MCNC: 0.74 NG/DL (ref 0.61–1.12)
TSH SERPL DL<=0.05 MIU/L-ACNC: 7.15 UIU/ML (ref 0.45–4.5)

## 2024-07-23 PROCEDURE — 84439 ASSAY OF FREE THYROXINE: CPT

## 2024-07-23 PROCEDURE — 36415 COLL VENOUS BLD VENIPUNCTURE: CPT

## 2024-07-23 PROCEDURE — 84443 ASSAY THYROID STIM HORMONE: CPT

## 2024-07-23 PROCEDURE — 99213 OFFICE O/P EST LOW 20 MIN: CPT

## 2024-07-23 RX ORDER — BUSPIRONE HYDROCHLORIDE 10 MG/1
10 TABLET ORAL DAILY
Qty: 90 TABLET | Refills: 0 | Status: SHIPPED | OUTPATIENT
Start: 2024-07-23

## 2024-07-23 NOTE — PROGRESS NOTES
Ambulatory Visit  Name: Sydnie Peters      : 1985      MRN: [id number]  Encounter Provider: NEGAR Narayanan  Encounter Date: 2024   Encounter department: [facility]    Assessment & Plan   1. Anxiety  Comments:  Pt would like to begin daily medication. Discussed risks/benefit. Advised 4 week f/u.  Orders:  -     busPIRone (BUSPAR) 10 mg tablet; Take 1 tablet (10 mg total) by mouth in the morning  2. Acquired hypothyroidism  Comments:  Labs pending from today. Continue 200 mcg Levothyroxine daily.  3. Encounter for immunization  -     Pneumococcal Conjugate Vaccine 20-valent (Pcv20)       History of Present Illness     6 mo f/u on anxiety & thyroid  Labs pending    Pt states anxiety has worsened since last visit and she would like to trial daily medication  States she has recently signed her son up for a summer baseball league and football in the fall and this is contributing to her stress  Has off for the summer - teacher in [place]  Denies depressive thoughts or mood changes  Denies SI or HI  Using Atarax sparingly/PRN        Review of Systems   Constitutional:  Negative for chills, fatigue and fever.   HENT:  Negative for congestion, ear pain, facial swelling, hearing loss, rhinorrhea, sinus pressure, sneezing, sore throat and trouble swallowing.    Eyes:  Negative for pain, redness and visual disturbance.   Respiratory:  Negative for cough, chest tightness, shortness of breath and wheezing.    Cardiovascular:  Negative for chest pain and palpitations.   Gastrointestinal:  Negative for abdominal pain, diarrhea, nausea and vomiting.   Genitourinary:  Negative for dysuria, flank pain, hematuria and pelvic pain.   Musculoskeletal:  Negative for arthralgias, back pain and myalgias.   Skin:  Negative for color change and rash.   Neurological:  Negative for dizziness, seizures, syncope, weakness, light-headedness and headaches.   Psychiatric/Behavioral:  Negative  "for confusion, hallucinations and sleep disturbance. The patient is nervous/anxious.    All other systems reviewed and are negative.    Medical History Reviewed by provider this encounter:  Tobacco  Allergies  Meds  Problems  Med Hx  Surg Hx  Fam Hx       Current Outpatient Medications on File Prior to Visit   Medication Sig Dispense Refill    acetaminophen (TYLENOL) 325 mg tablet Take 2 tablets by mouth every 4 (four) hours as needed for mild pain 30 tablet 0    Cholecalciferol (VITAMIN D-3) 1000 units CAPS Take 1 capsule by mouth daily        hydrOXYzine HCL (ATARAX) 25 mg tablet Take 1 tablet (25 mg total) by mouth every 6 (six) hours as needed for itching 30 tablet 0    levothyroxine 200 mcg tablet Take 1 tablet (200 mcg total) by mouth daily in the early morning 90 tablet 3    magnesium 30 MG tablet Take 30 mg by mouth 2 (two) times a day      multivitamin (THERAGRAN) TABS Take 1 tablet by mouth daily       No current facility-administered medications on file prior to visit.      Social History     Tobacco Use    Smoking status: Never    Smokeless tobacco: Never   Vaping Use    Vaping status: Never Used   Substance and Sexual Activity    Alcohol use: Yes     Comment: social    Drug use: No    Sexual activity: Yes     Partners: Male     Birth control/protection: None     Objective     /70   Pulse 86   Temp 97.5 °F (36.4 °C)   Ht 5' 8\" (1.727 m)   Wt 97.5 kg (215 lb)   SpO2 98%   BMI 32.69 kg/m²     Physical Exam  Vitals and nursing note reviewed.   Constitutional:       General: She is not in acute distress.     Appearance: She is well-developed.   HENT:      Head: Normocephalic and atraumatic.      Right Ear: Hearing and tympanic membrane normal.      Left Ear: Hearing and tympanic membrane normal.      Nose: Nose normal.      Mouth/Throat:      Mouth: Mucous membranes are moist.      Dentition: Normal dentition.      Tongue: No lesions.      Pharynx: Oropharynx is clear. Uvula midline. No " oropharyngeal exudate.      Tonsils: No tonsillar exudate.   Eyes:      Extraocular Movements: Extraocular movements intact.      Conjunctiva/sclera: Conjunctivae normal.   Neck:      Vascular: No carotid bruit or JVD.   Cardiovascular:      Rate and Rhythm: Normal rate and regular rhythm.      Heart sounds: S1 normal and S2 normal. No murmur heard.  Pulmonary:      Effort: Pulmonary effort is normal. No tachypnea or respiratory distress.      Breath sounds: Normal breath sounds and air entry. No decreased breath sounds.   Chest:      Chest wall: No deformity or tenderness.   Abdominal:      General: Abdomen is flat. Bowel sounds are normal. There is no distension.      Palpations: Abdomen is soft.      Tenderness: There is no abdominal tenderness. There is no right CVA tenderness, left CVA tenderness or guarding.   Musculoskeletal:         General: No swelling.      Cervical back: Full passive range of motion without pain and neck supple.      Right lower leg: No edema.      Left lower leg: No edema.   Lymphadenopathy:      Cervical: No cervical adenopathy.   Skin:     General: Skin is warm and dry.      Capillary Refill: Capillary refill takes less than 2 seconds.      Findings: No rash.   Neurological:      Mental Status: She is alert and oriented to person, place, and time.      Cranial Nerves: Cranial nerves 2-12 are intact.      Sensory: Sensation is intact.      Motor: Motor function is intact.      Coordination: Coordination is intact.      Gait: Gait is intact.   Psychiatric:         Mood and Affect: Mood normal.         Behavior: Behavior is cooperative.       Administrative Statements

## 2024-07-23 NOTE — TELEPHONE ENCOUNTER
----- Message from NEGAR Mariscal sent at 7/23/2024  1:47 PM EDT -----  Please let pt know TSH is elevated at 7.150. Any recent med changes? - birth control? Biotin supplements? No chance of pregnancy? Also please clarify if patient is taking her thyroid medicine consistently. I will place repeat labs for 3 months. She may continue current dosing at this time. Thank you.

## 2024-07-23 NOTE — TELEPHONE ENCOUNTER
Moon Sarkis  7/23/2024  2:49 PM EDT Back to Top      Pt was made aware of her labs.Pt has not had any med changes and no chance of pregnancy. She says she is taking her thyroid medicine consistently as prescribed.

## 2024-10-01 ENCOUNTER — HOSPITAL ENCOUNTER (OUTPATIENT)
Dept: RADIOLOGY | Facility: HOSPITAL | Age: 39
Discharge: HOME | End: 2024-10-01
Attending: OBSTETRICS & GYNECOLOGY
Payer: COMMERCIAL

## 2024-10-01 DIAGNOSIS — Z91.89 OTHER SPECIFIED PERSONAL RISK FACTORS, NOT ELSEWHERE CLASSIFIED: ICD-10-CM

## 2024-10-01 PROCEDURE — C8908 MRI W/O FOL W/CONT, BREAST,: HCPCS

## 2024-10-01 PROCEDURE — C8937 CAD BREAST MRI: HCPCS

## 2024-10-01 PROCEDURE — G1004 CDSM NDSC: HCPCS

## 2024-10-01 PROCEDURE — A9585 GADOBUTROL INJECTION: HCPCS | Performed by: OBSTETRICS & GYNECOLOGY

## 2024-10-01 RX ORDER — GADOBUTROL 604.72 MG/ML
9 INJECTION INTRAVENOUS
Status: COMPLETED | OUTPATIENT
Start: 2024-10-01 | End: 2024-10-01

## 2024-10-01 RX ADMIN — GADOBUTROL 9 ML: 604.72 INJECTION INTRAVENOUS at 16:58

## 2024-10-13 DIAGNOSIS — F41.9 ANXIETY: ICD-10-CM

## 2024-10-15 ENCOUNTER — HOSPITAL ENCOUNTER (OUTPATIENT)
Dept: ULTRASOUND IMAGING | Facility: HOSPITAL | Age: 39
Discharge: HOME | End: 2024-10-15
Payer: COMMERCIAL

## 2024-10-15 DIAGNOSIS — R92.8 ABNORMAL MRI, BREAST: ICD-10-CM

## 2024-10-15 PROCEDURE — 76642 ULTRASOUND BREAST LIMITED: CPT

## 2024-10-15 RX ORDER — BUSPIRONE HYDROCHLORIDE 10 MG/1
10 TABLET ORAL EVERY MORNING
Qty: 90 TABLET | Refills: 0 | Status: SHIPPED | OUTPATIENT
Start: 2024-10-15

## 2024-10-15 NOTE — PROGRESS NOTES
Met with patient and   regarding recommendation for;    ___x__ RIGHT ______LEFT      __x___Ultrasound guided  ______Stereotactic breast biopsy.      __X___Verbalized understanding.    Reviewed clip placement with patient, pt states understanding: Yes: __x__ No: ____  Comments:    Blood thinners:  No: __x___ Yes: ______ What:          Biopsy teaching sheet given:  Yes: ___X___ No: ________    Pt given contact information and adv to call with any questions/needs    Patient advised to arrive at 12:30pm for a 1:00pm appointment on 11/12/24

## 2024-11-12 ENCOUNTER — HOSPITAL ENCOUNTER (OUTPATIENT)
Dept: MAMMOGRAPHY | Facility: HOSPITAL | Age: 39
Discharge: HOME | End: 2024-11-12
Payer: COMMERCIAL

## 2024-11-12 ENCOUNTER — HOSPITAL ENCOUNTER (OUTPATIENT)
Dept: ULTRASOUND IMAGING | Facility: HOSPITAL | Age: 39
Discharge: HOME | End: 2024-11-12
Payer: COMMERCIAL

## 2024-11-12 VITALS — DIASTOLIC BLOOD PRESSURE: 79 MMHG | SYSTOLIC BLOOD PRESSURE: 121 MMHG | HEART RATE: 71 BPM

## 2024-11-12 DIAGNOSIS — R92.8 ABNORMAL MRI, BREAST: ICD-10-CM

## 2024-11-12 PROCEDURE — 19083 BX BREAST 1ST LESION US IMAG: CPT

## 2024-11-12 PROCEDURE — 76942 ECHO GUIDE FOR BIOPSY: CPT

## 2024-11-12 PROCEDURE — 88341 IMHCHEM/IMCYTCHM EA ADD ANTB: CPT | Performed by: PATHOLOGY

## 2024-11-12 PROCEDURE — A4648 IMPLANTABLE TISSUE MARKER: HCPCS

## 2024-11-12 PROCEDURE — 88305 TISSUE EXAM BY PATHOLOGIST: CPT | Performed by: PATHOLOGY

## 2024-11-12 PROCEDURE — 88342 IMHCHEM/IMCYTCHM 1ST ANTB: CPT | Performed by: PATHOLOGY

## 2024-11-12 RX ORDER — LIDOCAINE HYDROCHLORIDE 10 MG/ML
5 INJECTION, SOLUTION EPIDURAL; INFILTRATION; INTRACAUDAL; PERINEURAL ONCE
Status: COMPLETED | OUTPATIENT
Start: 2024-11-12 | End: 2024-11-12

## 2024-11-12 RX ADMIN — LIDOCAINE HYDROCHLORIDE 5 ML: 10 INJECTION, SOLUTION EPIDURAL; INFILTRATION; INTRACAUDAL; PERINEURAL at 13:27

## 2024-11-12 RX ADMIN — LIDOCAINE HYDROCHLORIDE 5 ML: 10 INJECTION, SOLUTION EPIDURAL; INFILTRATION; INTRACAUDAL; PERINEURAL at 13:20

## 2024-11-12 NOTE — PROGRESS NOTES
Procedure type:    __x___ultrasound guided _____stereotactic    Breast:    _____Left __x___Right    Location: 8:00 7cmfn    Needle: 14g Marquee    # of passes: 4    Clip: butterfly    Performed by: Dr. Qiu    Pressure held for 5 minutes by: Jessika CHAPARRO          Procedure type:    __x___ultrasound guided _____stereotactic    Breast:    _____Left __x___Right    Location: axilla    Needle: 16g Marchandrikae    # of passes: 3    Clip: open coil    Performed by: Dr. Qiu    Pressure held for 5 minutes by: Jessika CHAPARRO          Steri Strips:    _x____yes _____no    Band aid:    __x___yes_____no    Tape and guaze:    _____yes ___x__no    Tolerated procedure:    ___x__yes _____no

## 2024-11-12 NOTE — PROGRESS NOTES
Patient arrived via:    __x___ambulatory    _____wheelchair    _____stretcher      Domestic violence screen    ____x__negative______positive    Breast Implants:    _______yes ____x____no

## 2024-11-14 ENCOUNTER — TELEPHONE (OUTPATIENT)
Dept: MAMMOGRAPHY | Facility: CLINIC | Age: 39
End: 2024-11-14

## 2024-11-14 DIAGNOSIS — D48.60 PHYLLODES TUMOR OF BREAST: Primary | ICD-10-CM

## 2024-11-14 PROCEDURE — 88341 IMHCHEM/IMCYTCHM EA ADD ANTB: CPT | Performed by: PATHOLOGY

## 2024-11-14 PROCEDURE — 88342 IMHCHEM/IMCYTCHM 1ST ANTB: CPT | Performed by: PATHOLOGY

## 2024-11-14 PROCEDURE — 88305 TISSUE EXAM BY PATHOLOGIST: CPT | Performed by: PATHOLOGY

## 2024-11-14 NOTE — TELEPHONE ENCOUNTER
Call placed to patient and she was given breast biopsy results, questions answered, adv next step is to set patient up with surgeon, options discussed and pt would like to have appt made with Dr. Jerzy Aguilar, adv patient that  would call her back within a week with appt, pt states understanding, pt with no questions at this time, has name/# for any further needs

## 2024-11-14 NOTE — PROGRESS NOTES
Post procedure call completed    Bleeding: _____yes __X___no (pt denies)    Pain: _____yes ___X___no (pt denies, used ice, no need for OTC pain meds)    Redness/Swelling: ______yes ___X___no (pt denies)    Band aid removed: __X___yes _____no     Steri-Strips intact: ___X___yes _____no (discussed with patient to remove steri strips on Sunday if they have not come off on their own)    Pt with no questions at this time, adv to call with any questions or concerns, has name/# for contact

## 2024-11-27 ENCOUNTER — CONSULT (OUTPATIENT)
Dept: SURGERY | Facility: CLINIC | Age: 39
End: 2024-11-27
Payer: COMMERCIAL

## 2024-11-27 VITALS
SYSTOLIC BLOOD PRESSURE: 118 MMHG | TEMPERATURE: 97.8 F | HEART RATE: 81 BPM | OXYGEN SATURATION: 98 % | BODY MASS INDEX: 32.8 KG/M2 | HEIGHT: 68 IN | RESPIRATION RATE: 16 BRPM | WEIGHT: 216.4 LBS | DIASTOLIC BLOOD PRESSURE: 74 MMHG

## 2024-11-27 DIAGNOSIS — Z12.39 BREAST CANCER SCREENING, HIGH RISK PATIENT: ICD-10-CM

## 2024-11-27 DIAGNOSIS — D24.1 FIBROADENOMA OF BREAST, RIGHT: Primary | ICD-10-CM

## 2024-11-27 DIAGNOSIS — Z84.89 FAMILY HISTORY OF NEOPLASM OF BREAST: ICD-10-CM

## 2024-11-27 PROCEDURE — 99204 OFFICE O/P NEW MOD 45 MIN: CPT | Performed by: SURGERY

## 2024-12-02 PROBLEM — Z84.89 FAMILY HISTORY OF NEOPLASM OF BREAST: Status: ACTIVE | Noted: 2024-12-02

## 2024-12-02 PROBLEM — Z12.39 BREAST CANCER SCREENING, HIGH RISK PATIENT: Status: ACTIVE | Noted: 2024-12-02

## 2024-12-02 PROBLEM — D24.1 FIBROADENOMA OF BREAST, RIGHT: Status: ACTIVE | Noted: 2024-12-02

## 2024-12-02 RX ORDER — SODIUM CHLORIDE, SODIUM LACTATE, POTASSIUM CHLORIDE, CALCIUM CHLORIDE 600; 310; 30; 20 MG/100ML; MG/100ML; MG/100ML; MG/100ML
125 INJECTION, SOLUTION INTRAVENOUS CONTINUOUS
OUTPATIENT
Start: 2024-12-02

## 2024-12-02 NOTE — PROGRESS NOTES
Consult - Surgical Oncology  Sydnie Peetrs 39 y.o. 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 Of note, an indeterminate right axillary lymph node was identified on workup and biopsied as benign and concordant. Standard clips in place at both the breast lesion and the right axillary node.    Discussed the differential diagnosis of fibroepithelial neoplasm and the likelihood of benign disease at the time of surgical excision. Discussed the more rare but possible diagnoses of benign and malignant Phyllodes tumor. We are both in agreement to proceed with a right breast surgical excisional biopsy with hernán  localization to entirely remove the lesion and confirm the diagnosis. She will need a hernán reflector placed. We discussed risks and benefits including bleeding, infection, recurrence, need for further procedures based on final pathology. Informed consent signed, all questions answered.     She had a vascular procedure in the recent past and overall recovered well but did have trouble with postoperative nausea and vomiting. She will discuss this with the anesthesia team on the day of her procedure.    She will be due for her next mammogram in 3/2025. We will finalize her plan for this upon review of her surgical pathology.    She reports that her mother was BRCA negative, we may want to pursue a genetics evaluation to ensure that a detailed multigene panel evaluation is performed beyond the BRCA testing that her mother had, will revisit this after surgical pathology reviewed.       History of Present Illness   Chief Complaint   Patient presents with    Breast Complaint     High risk screening with GYN, mammogram normal 3/2024. MRI 10/2024 showed a 9mm mass in the  right lower outer breast at 8 oclock and bilateral breast cysts. US showed a correlate at 8 oclock, 7cm from the nipple measuring 8 x 5 x 7mm and an an indeterminate right axillary lymph node with 5mm cortex. Biopsies performed. Breast lesion is a cellular fibroepithelial neoplasm, no atypia, no malignancy, cannot rule out benign Phyllodes. Axilla benign and concordant. Standard biopsy clips left in both locations.    Menarche - 9  Pregnancies - 2  Age at youngest pregnancy - 30  OCP - yes   Menopause - N/A  HRT - no  Personal - prior abnormal pap/colposcopy, no skin procedures, no colonoscopies, no prior breast or axillary surgeries, this was her first breast biopsy  Family - mother with breast cancer twice at 30 and 54, BRCA negative, had bilateral mastectomies, maternal aunt with breast in her 60's        Review of Systems   Constitutional: Negative.    Skin:         Right breast mass   Hematological: Negative.    All other systems reviewed and are negative.      Historical Information   Past Medical History:   Diagnosis Date    Abnormal Pap smear of cervix     Anxiety     Disease of thyroid gland     Exercise involving walking     Female infertility     IUI pregnancy    History of COVID-19 09/2021    recovered at home    Right knee injury 2019    Varicella     Varicose vein of leg      Past Surgical History:   Procedure Laterality Date    BUNIONECTOMY Left     COLPOSCOPY      GA ENDOVEN ABLTJ INCMPTNT VEIN XTR LASER 1ST VEIN Right 8/18/2023    Procedure: R GSV EVLT and stab phlebectomies x 39;  Surgeon: Mitzi Campbell MD;  [facility]: [facility];  Service: Vascular    US GUIDED BREAST BIOPSY RIGHT COMPLETE Right 11/12/2024    US GUIDED BREAST LYMPH NODE BIOPSY RIGHT Right 11/12/2024    WISDOM TOOTH EXTRACTION       Social History   Social History     Substance and Sexual Activity   Alcohol Use Yes    Comment: social     Social History     Substance and Sexual Activity   Drug Use No     Social History     Tobacco  "Use   Smoking Status Never   Smokeless Tobacco Never     Family History   Problem Relation Age of Onset    Breast cancer Mother 32    BRCA1 Negative Mother     BRCA2 Negative Mother     No Known Problems Father     No Known Problems Sister     No Known Problems Maternal Grandmother     No Known Problems Maternal Grandfather     No Known Problems Paternal Grandmother     No Known Problems Paternal Grandfather     Breast cancer Maternal Aunt 66    No Known Problems Maternal Aunt     No Known Problems Maternal Aunt     No Known Problems Paternal Aunt        Meds/Allergies     Current Outpatient Medications:     acetaminophen (TYLENOL) 325 mg tablet, Take 2 tablets by mouth every 4 (four) hours as needed for mild pain, Disp: 30 tablet, Rfl: 0    busPIRone (BUSPAR) 10 mg tablet, take 1 tablet by mouth every morning, Disp: 90 tablet, Rfl: 0    Cholecalciferol (VITAMIN D-3) 1000 units CAPS, Take 1 capsule by mouth daily  , Disp: , Rfl:     hydrOXYzine HCL (ATARAX) 25 mg tablet, Take 1 tablet (25 mg total) by mouth every 6 (six) hours as needed for itching, Disp: 30 tablet, Rfl: 0    levothyroxine 200 mcg tablet, Take 1 tablet (200 mcg total) by mouth daily in the early morning, Disp: 90 tablet, Rfl: 3    multivitamin (THERAGRAN) TABS, Take 1 tablet by mouth daily, Disp: , Rfl:     magnesium 30 MG tablet, Take 30 mg by mouth 2 (two) times a day, Disp: , Rfl:   No Known Allergies    The following portions of the patient's history were reviewed and updated as appropriate: allergies, current medications, past family history, past medical history, past social history, past surgical history, and problem list.    Objective   Current Vitals:   Blood pressure 118/74, pulse 81, temperature 97.8 °F (36.6 °C), temperature source Temporal, resp. rate 16, height 5' 8\" (1.727 m), weight 98.2 kg (216 lb 6.4 oz), SpO2 98%, not currently breastfeeding.    Physical Exam  Vitals reviewed.   Constitutional:       General: She is not in acute " distress.     Appearance: She is not toxic-appearing.   HENT:      Head: Normocephalic.      Nose: No congestion.      Mouth/Throat:      Mouth: Mucous membranes are moist.   Eyes:      Pupils: Pupils are equal, round, and reactive to light.   Cardiovascular:      Rate and Rhythm: Normal rate.   Pulmonary:      Effort: Pulmonary effort is normal. No respiratory distress.   Abdominal:      Palpations: Abdomen is soft.   Musculoskeletal:         General: Normal range of motion.      Cervical back: Normal range of motion and neck supple.   Lymphadenopathy:      Cervical: No cervical adenopathy.   Skin:     General: Skin is warm and dry.      Capillary Refill: Capillary refill takes less than 2 seconds.   Neurological:      General: No focal deficit present.      Mental Status: She is alert.   Psychiatric:         Mood and Affect: Mood normal.     The patient has no palpable cervical, supraclavicular, or axillary lymphadenopathy bilaterally.  The breasts are symmetric.  There are no dominant lumps, masses, skin changes or nipple retraction bilaterally.     I have spent a total time of 40 minutes in caring for this patient on the day of the visit/encounter including Diagnostic results, Prognosis, Risks and benefits of tx options, Patient and family education, Impressions, Counseling / Coordination of care, Reviewing / ordering tests, medicine, procedures  , and Obtaining or reviewing history  .   Signature:  Jerzy Aguilar MD  Date: 12/2/2024 Time: 1:41 PM

## 2024-12-04 ENCOUNTER — TELEPHONE (OUTPATIENT)
Dept: SURGERY | Facility: CLINIC | Age: 39
End: 2024-12-04

## 2024-12-04 DIAGNOSIS — D24.1 FIBROADENOMA OF BREAST, RIGHT: Primary | ICD-10-CM

## 2024-12-04 NOTE — TELEPHONE ENCOUNTER
Patient returned your call. Attempted a warm transfer but it went to voicemail. Please call the patient back at [phone]. Her lunch is from 11:45 to 12:15 pm, but you can leave a message if she does not answer. Thank you.

## 2024-12-05 NOTE — PROGRESS NOTES
Call placed to patient regarding recommendation for;    __X___ RIGHT ______LEFT      __X___SAVI  placement.    Procedure explained to patient, additional questions answered at this time    __X___Verbalized understanding.      Blood thinners:  _____yes __X___no    Date stopped: ___N/A____    All teaching points discussed during call, pt with no questions at this time, pt adv to arrive at 1230 for 1300 insertion    Pt given name/# for any further questions/needs

## 2024-12-31 ENCOUNTER — OFFICE VISIT (OUTPATIENT)
Dept: URGENT CARE | Facility: CLINIC | Age: 39
End: 2024-12-31
Payer: COMMERCIAL

## 2024-12-31 VITALS
TEMPERATURE: 98.1 F | DIASTOLIC BLOOD PRESSURE: 57 MMHG | BODY MASS INDEX: 32.89 KG/M2 | WEIGHT: 217 LBS | RESPIRATION RATE: 20 BRPM | OXYGEN SATURATION: 97 % | HEIGHT: 68 IN | HEART RATE: 84 BPM | SYSTOLIC BLOOD PRESSURE: 116 MMHG

## 2024-12-31 DIAGNOSIS — J01.00 ACUTE MAXILLARY SINUSITIS, RECURRENCE NOT SPECIFIED: Primary | ICD-10-CM

## 2024-12-31 PROCEDURE — 99213 OFFICE O/P EST LOW 20 MIN: CPT | Performed by: PHYSICIAN ASSISTANT

## 2024-12-31 RX ORDER — AMOXICILLIN 875 MG/1
875 TABLET, COATED ORAL 2 TIMES DAILY
Qty: 14 TABLET | Refills: 0 | Status: SHIPPED | OUTPATIENT
Start: 2024-12-31 | End: 2025-01-07

## 2024-12-31 NOTE — PATIENT INSTRUCTIONS
Discussed symptoms are most likely viral in nature.  Recommend continuing supportive care.  If symptoms do not improve or resolve over the next 3 to 4 days can start antibiotic as prescribed.  All patient's questions answered.

## 2024-12-31 NOTE — PROGRESS NOTES
[facility]        NAME: Sydnie Peters is a 39 y.o. female  : 1985    MRN: [id number]  DATE: 2024  TIME: 4:27 PM    Assessment and Plan   Acute maxillary sinusitis, recurrence not specified [J01.00]  1. Acute maxillary sinusitis, recurrence not specified  amoxicillin (AMOXIL) 875 mg tablet            Patient Instructions     Patient Instructions   Discussed symptoms are most likely viral in nature.  Recommend continuing supportive care.  If symptoms do not improve or resolve over the next 3 to 4 days can start antibiotic as prescribed.  All patient's questions answered.      Follow up with PCP in 3-5 days.  Proceed to  ER if symptoms worsen.    Chief Complaint     Chief Complaint   Patient presents with    Cough     Deep moist cough with productive sputum that is colored    Sinusitis     Sinus pain and pressure, worse on left side vs right. Taking mucinex OTC          History of Present Illness       Patient is a 39-year-old female presenting today with cold-like symptoms x 5 days.  Patient notes of last few days she has had some nasal congestion, runny nose, postnasal drip and a slight cough.  Is worried that symptoms are turning into a sinus infection.  Has been taking over-the-counter cough and cold medication with slight relief.  Denies any known sick contacts.  Denies fever, chest tightness SOB, N/V/D.        Review of Systems   Review of Systems   Constitutional:  Negative for chills, fatigue and fever.   HENT:  Positive for congestion, postnasal drip, sinus pressure and sore throat.    Eyes:  Negative for redness and itching.   Respiratory:  Positive for cough. Negative for chest tightness and shortness of breath.    Cardiovascular:  Negative for chest pain.   Gastrointestinal:  Negative for diarrhea, nausea and vomiting.   Musculoskeletal:  Negative for arthralgias and myalgias.   Neurological:  Negative for light-headedness and headaches.         Current Medications        Current Outpatient Medications:     acetaminophen (TYLENOL) 325 mg tablet, Take 2 tablets by mouth every 4 (four) hours as needed for mild pain, Disp: 30 tablet, Rfl: 0    amoxicillin (AMOXIL) 875 mg tablet, Take 1 tablet (875 mg total) by mouth 2 (two) times a day for 7 days, Disp: 14 tablet, Rfl: 0    busPIRone (BUSPAR) 10 mg tablet, take 1 tablet by mouth every morning, Disp: 90 tablet, Rfl: 0    Cholecalciferol (VITAMIN D-3) 1000 units CAPS, Take 1 capsule by mouth daily  , Disp: , Rfl:     hydrOXYzine HCL (ATARAX) 25 mg tablet, Take 1 tablet (25 mg total) by mouth every 6 (six) hours as needed for itching, Disp: 30 tablet, Rfl: 0    levothyroxine 200 mcg tablet, Take 1 tablet (200 mcg total) by mouth daily in the early morning, Disp: 90 tablet, Rfl: 3    multivitamin (THERAGRAN) TABS, Take 1 tablet by mouth daily, Disp: , Rfl:     magnesium 30 MG tablet, Take 30 mg by mouth 2 (two) times a day, Disp: , Rfl:     Current Allergies     Allergies as of 12/31/2024    (No Known Allergies)            The following portions of the patient's history were reviewed and updated as appropriate: allergies, current medications, past family history, past medical history, past social history, past surgical history and problem list.     Past Medical History:   Diagnosis Date    Abnormal Pap smear of cervix     Anxiety     Disease of thyroid gland     Exercise involving walking     Female infertility     IUI pregnancy    History of COVID-19 09/2021    recovered at home    Right knee injury 2019    Varicella     Varicose vein of leg        Past Surgical History:   Procedure Laterality Date    BUNIONECTOMY Left     COLPOSCOPY      UT ENDOVEN ABLTJ INCMPTNT VEIN XTR LASER 1ST VEIN Right 8/18/2023    Procedure: R GSV EVLT and stab phlebectomies x 39;  Surgeon: Mitzi Campbell MD;  Location: [facility];  Service: Vascular    US GUIDED BREAST BIOPSY RIGHT COMPLETE Right 11/12/2024    US GUIDED BREAST LYMPH NODE BIOPSY RIGHT Right  "11/12/2024    WISDOM TOOTH EXTRACTION         Family History   Problem Relation Age of Onset    Breast cancer Mother 32    BRCA1 Negative Mother     BRCA2 Negative Mother     No Known Problems Father     No Known Problems Sister     No Known Problems Maternal Grandmother     No Known Problems Maternal Grandfather     No Known Problems Paternal Grandmother     No Known Problems Paternal Grandfather     Breast cancer Maternal Aunt 66    No Known Problems Maternal Aunt     No Known Problems Maternal Aunt     No Known Problems Paternal Aunt          Medications have been verified.        Objective   /57 (BP Location: Left arm)   Pulse 84   Temp 98.1 °F (36.7 °C)   Resp 20   Ht 5' 8\" (1.727 m)   Wt 98.4 kg (217 lb)   SpO2 97%   BMI 32.99 kg/m²        Physical Exam     Physical Exam  Vitals and nursing note reviewed.   Constitutional:       General: She is not in acute distress.     Appearance: Normal appearance.   HENT:      Head: Normocephalic and atraumatic.      Right Ear: Tympanic membrane, ear canal and external ear normal.      Left Ear: Tympanic membrane, ear canal and external ear normal.      Nose: Congestion present.      Right Turbinates: Swollen and pale.      Left Turbinates: Swollen and pale.      Right Sinus: Maxillary sinus tenderness present. No frontal sinus tenderness.      Left Sinus: Maxillary sinus tenderness present. No frontal sinus tenderness.      Mouth/Throat:      Mouth: Mucous membranes are moist.      Pharynx: Oropharynx is clear.   Eyes:      Conjunctiva/sclera: Conjunctivae normal.      Pupils: Pupils are equal, round, and reactive to light.   Cardiovascular:      Rate and Rhythm: Normal rate and regular rhythm.      Pulses: Normal pulses.      Heart sounds: Normal heart sounds.   Pulmonary:      Effort: Pulmonary effort is normal.      Breath sounds: Normal breath sounds.   Musculoskeletal:      Cervical back: Normal range of motion. No tenderness.   Lymphadenopathy:      " Cervical: No cervical adenopathy.   Skin:     General: Skin is warm.      Capillary Refill: Capillary refill takes less than 2 seconds.   Neurological:      General: No focal deficit present.      Mental Status: She is alert.   Psychiatric:         Mood and Affect: Mood normal.

## 2025-01-09 DIAGNOSIS — F41.9 ANXIETY: ICD-10-CM

## 2025-01-09 DIAGNOSIS — E03.9 ACQUIRED HYPOTHYROIDISM: ICD-10-CM

## 2025-01-10 ENCOUNTER — TELEPHONE (OUTPATIENT)
Age: 40
End: 2025-01-10

## 2025-01-10 RX ORDER — BUSPIRONE HYDROCHLORIDE 10 MG/1
10 TABLET ORAL EVERY MORNING
Qty: 90 TABLET | Refills: 1 | Status: SHIPPED | OUTPATIENT
Start: 2025-01-10

## 2025-01-10 RX ORDER — LEVOTHYROXINE SODIUM 200 UG/1
200 TABLET ORAL
Qty: 90 TABLET | Refills: 3 | Status: SHIPPED | OUTPATIENT
Start: 2025-01-10 | End: 2025-01-15 | Stop reason: SDUPTHER

## 2025-01-10 NOTE — TELEPHONE ENCOUNTER
Phone call from patient stating she has an appointment on 1/20/25 with Alanna Collins and she see orders for thyroid testing and patient asking if there was any other tests that needed to be completed for this appointment.   Please advise and call patient. Thankyou.

## 2025-01-13 ENCOUNTER — RA CDI HCC (OUTPATIENT)
Dept: OTHER | Facility: HOSPITAL | Age: 40
End: 2025-01-13

## 2025-01-13 DIAGNOSIS — E66.811 CLASS 1 OBESITY DUE TO EXCESS CALORIES WITHOUT SERIOUS COMORBIDITY WITH BODY MASS INDEX (BMI) OF 32.0 TO 32.9 IN ADULT: Primary | ICD-10-CM

## 2025-01-13 DIAGNOSIS — E66.09 CLASS 1 OBESITY DUE TO EXCESS CALORIES WITHOUT SERIOUS COMORBIDITY WITH BODY MASS INDEX (BMI) OF 32.0 TO 32.9 IN ADULT: Primary | ICD-10-CM

## 2025-01-13 PROBLEM — E66.3 OVERWEIGHT (BMI 25.0-29.9): Status: RESOLVED | Noted: 2020-01-13 | Resolved: 2025-01-13

## 2025-01-13 PROBLEM — Z00.00 ANNUAL PHYSICAL EXAM: Status: RESOLVED | Noted: 2022-12-08 | Resolved: 2025-01-13

## 2025-01-14 ENCOUNTER — HOSPITAL ENCOUNTER (OUTPATIENT)
Dept: ULTRASOUND IMAGING | Facility: HOSPITAL | Age: 40
Discharge: HOME | End: 2025-01-14
Attending: SURGERY
Payer: COMMERCIAL

## 2025-01-14 ENCOUNTER — APPOINTMENT (OUTPATIENT)
Dept: LAB | Facility: CLINIC | Age: 40
End: 2025-01-14
Payer: COMMERCIAL

## 2025-01-14 ENCOUNTER — HOSPITAL ENCOUNTER (OUTPATIENT)
Dept: MAMMOGRAPHY | Facility: HOSPITAL | Age: 40
Discharge: HOME | End: 2025-01-14
Payer: COMMERCIAL

## 2025-01-14 VITALS — DIASTOLIC BLOOD PRESSURE: 77 MMHG | SYSTOLIC BLOOD PRESSURE: 115 MMHG | HEART RATE: 76 BPM

## 2025-01-14 DIAGNOSIS — D24.1 FIBROADENOMA OF BREAST, RIGHT: ICD-10-CM

## 2025-01-14 DIAGNOSIS — E03.9 ACQUIRED HYPOTHYROIDISM: ICD-10-CM

## 2025-01-14 DIAGNOSIS — R92.8 ABNORMAL MAMMOGRAM: ICD-10-CM

## 2025-01-14 LAB
T4 FREE SERPL-MCNC: 1.18 NG/DL (ref 0.61–1.12)
TSH SERPL DL<=0.05 MIU/L-ACNC: 0.03 UIU/ML (ref 0.45–4.5)

## 2025-01-14 PROCEDURE — 84443 ASSAY THYROID STIM HORMONE: CPT

## 2025-01-14 PROCEDURE — A4648 IMPLANTABLE TISSUE MARKER: HCPCS

## 2025-01-14 PROCEDURE — 36415 COLL VENOUS BLD VENIPUNCTURE: CPT

## 2025-01-14 PROCEDURE — 19285 PERQ DEV BREAST 1ST US IMAG: CPT

## 2025-01-14 PROCEDURE — 84439 ASSAY OF FREE THYROXINE: CPT

## 2025-01-14 RX ORDER — LIDOCAINE HYDROCHLORIDE 10 MG/ML
5 INJECTION, SOLUTION EPIDURAL; INFILTRATION; INTRACAUDAL; PERINEURAL ONCE
Status: COMPLETED | OUTPATIENT
Start: 2025-01-14 | End: 2025-01-14

## 2025-01-14 RX ADMIN — LIDOCAINE HYDROCHLORIDE 5 ML: 10 INJECTION, SOLUTION EPIDURAL; INFILTRATION; INTRACAUDAL; PERINEURAL at 13:08

## 2025-01-14 NOTE — PROGRESS NOTES
Procedure type:    __x___ultrasound guided _____stereotactic    Breast:    _____Left ___x__Right    Location: 8 o'clock 7 cmfn    Needle: n/a    # of passes: n/a    Clip: hernán     Performed by: Dr. Collier    Pressure held for 5 minutes by: Andie Rivera    Stermae Strips:    _____yes ___x__no    Band aid:    __X___yes_____no    Tolerated procedure:    __X___yes _____no

## 2025-01-15 ENCOUNTER — RESULTS FOLLOW-UP (OUTPATIENT)
Dept: FAMILY MEDICINE CLINIC | Facility: CLINIC | Age: 40
End: 2025-01-15

## 2025-01-15 DIAGNOSIS — E03.9 ACQUIRED HYPOTHYROIDISM: ICD-10-CM

## 2025-01-15 RX ORDER — LEVOTHYROXINE SODIUM 175 UG/1
175 TABLET ORAL DAILY
Qty: 60 TABLET | Refills: 0 | Status: SHIPPED | OUTPATIENT
Start: 2025-01-15

## 2025-01-16 NOTE — PROGRESS NOTES
Post procedure call completed    Bleeding: _____yes __X___no (pt denies)    Pain: _____yes ___X___no (pt denies, used ice, no need for OTC pain meds)    Redness/Swelling: ______yes ___X___no (pt denies)    Band aid removed: ___X__yes _____no     Pt with no questions at this time, adv to call with any questions or concerns, has name/# for contact

## 2025-01-20 ENCOUNTER — OFFICE VISIT (OUTPATIENT)
Dept: FAMILY MEDICINE CLINIC | Facility: CLINIC | Age: 40
End: 2025-01-20
Payer: COMMERCIAL

## 2025-01-20 VITALS
HEART RATE: 65 BPM | OXYGEN SATURATION: 98 % | BODY MASS INDEX: 32.89 KG/M2 | DIASTOLIC BLOOD PRESSURE: 86 MMHG | SYSTOLIC BLOOD PRESSURE: 118 MMHG | WEIGHT: 217 LBS | TEMPERATURE: 98.1 F | HEIGHT: 68 IN

## 2025-01-20 DIAGNOSIS — J45.20 MILD INTERMITTENT ASTHMA WITHOUT COMPLICATION: ICD-10-CM

## 2025-01-20 DIAGNOSIS — Z23 ENCOUNTER FOR IMMUNIZATION: ICD-10-CM

## 2025-01-20 DIAGNOSIS — E78.5 HYPERLIPIDEMIA, UNSPECIFIED HYPERLIPIDEMIA TYPE: ICD-10-CM

## 2025-01-20 DIAGNOSIS — Z12.31 ENCOUNTER FOR SCREENING MAMMOGRAM FOR BREAST CANCER: ICD-10-CM

## 2025-01-20 DIAGNOSIS — Z00.00 ANNUAL PHYSICAL EXAM: Primary | ICD-10-CM

## 2025-01-20 DIAGNOSIS — E03.9 ACQUIRED HYPOTHYROIDISM: ICD-10-CM

## 2025-01-20 DIAGNOSIS — F41.9 ANXIETY: ICD-10-CM

## 2025-01-20 PROCEDURE — 99214 OFFICE O/P EST MOD 30 MIN: CPT

## 2025-01-20 PROCEDURE — 99396 PREV VISIT EST AGE 40-64: CPT

## 2025-01-20 RX ORDER — LORATADINE 10 MG/1
10 TABLET ORAL DAILY
COMMUNITY

## 2025-01-20 NOTE — PROGRESS NOTES
Adult Annual Physical  Name: Sydnie Peters      : 1985      MRN: [id number]  Encounter Provider: NEGAR Narayanan  Encounter Date: 2025   Encounter department: [facility]    Assessment & Plan  Annual physical exam    Previous notes reviewed.  Offers no complaints.  Accompanied by her 2 sons at today's visit.         Acquired hypothyroidism    Most recent labs TSH 0.025/T4 1.18.  Levo lowered to 175 mcg daily.  Repeat labs in 8 weeks.  Counseled.         Anxiety    Stable per patient.  Continue Buspar as prescribed.         Hyperlipidemia, unspecified hyperlipidemia type    Most recent lipid panel reviewed 24.  Chol 164/Trigs 124/HDL 47/LDL 92.  Counseled.         Mild intermittent asthma without complication         Encounter for immunization    Counseled.    Orders:    Pneumococcal Conjugate Vaccine 20-valent (Pcv20)    Zoster Vaccine Recombinant IM    Encounter for screening mammogram for breast cancer    Orders:    Mammo screening bilateral w 3d and cad; Future    Immunizations and preventive care screenings were discussed with patient today. Appropriate education was printed on patient's after visit summary.    Counseling:  Alcohol/drug use: discussed moderation in alcohol intake, the recommendations for healthy alcohol use, and avoidance of illicit drug use.  Dental Health: discussed importance of regular tooth brushing, flossing, and dental visits.  Injury prevention: discussed safety/seat belts, safety helmets, smoke detectors, carbon monoxide detectors, and smoking near bedding or upholstery.  Sexual health: discussed sexually transmitted diseases, partner selection, use of condoms, avoidance of unintended pregnancy, and contraceptive alternatives.  Exercise: the importance of regular exercise/physical activity was discussed. Recommend exercise 3-5 times per week for at least 30 minutes.       Depression Screening and Follow-up Plan: Patient was  screened for depression during today's encounter. They screened negative with a PHQ-2 score of 0.        History of Present Illness     Adult Annual Physical:  Patient presents for annual physical.     Diet and Physical Activity:  - Diet/Nutrition: well balanced diet.  - Exercise: walking.    Depression Screening:  - PHQ-2 Score: 0    General Health:  - Sleep: sleeps well.  - Hearing: normal hearing right ear and normal hearing left ear.  - Vision: no vision problems.  - Dental: regular dental visits.    /GYN Health:  - Follows with GYN: yes.   - History of STDs: no  - Contraception:. [facility]      Advanced Care Planning:  - Has an advanced directive?: no    - Has a durable medical POA?: no    - ACP document given to patient?: yes      Review of Systems   Constitutional:  Negative for chills, fatigue and fever.   HENT:  Negative for congestion, ear pain, facial swelling, hearing loss, rhinorrhea, sinus pressure, sneezing, sore throat and trouble swallowing.    Eyes:  Negative for pain, redness and visual disturbance.   Respiratory:  Negative for cough, chest tightness, shortness of breath and wheezing.    Cardiovascular:  Negative for chest pain and palpitations.   Gastrointestinal:  Negative for abdominal pain, diarrhea, nausea and vomiting.   Genitourinary:  Negative for dysuria, flank pain, hematuria and pelvic pain.   Musculoskeletal:  Negative for arthralgias, back pain and myalgias.   Skin:  Negative for color change and rash.   Neurological:  Negative for dizziness, seizures, syncope, weakness, light-headedness and headaches.   Psychiatric/Behavioral:  Negative for confusion, hallucinations and sleep disturbance. The patient is not nervous/anxious.    All other systems reviewed and are negative.    Medical History Reviewed by provider this encounter:  Tobacco  Allergies  Meds  Problems  Med Hx  Surg Hx  Fam Hx     .  Past Medical History   Past Medical History:   Diagnosis Date    Abnormal Pap  smear of cervix     Anxiety     Disease of thyroid gland     Exercise involving walking     Female infertility     IUI pregnancy    History of COVID-19 09/2021    recovered at home    Right knee injury 2019    Varicella     Varicose vein of leg      Past Surgical History:   Procedure Laterality Date    BREAST BIOPSY Right 01/13/2025    hernán  right breast    BUNIONECTOMY Left     COLPOSCOPY      AL ENDOVEN ABLTJ INCMPTNT VEIN XTR LASER 1ST VEIN Right 08/18/2023    Procedure: R GSV EVLT and stab phlebectomies x 39;  Surgeon: Mitzi Campbell MD;  Location: [facility];  Service: Vascular    US BREAST CLIP NEEDLE LOC RIGHT Right 1/14/2025    US GUIDED BREAST BIOPSY RIGHT COMPLETE Right 11/12/2024    US GUIDED BREAST LYMPH NODE BIOPSY RIGHT Right 11/12/2024    WISDOM TOOTH EXTRACTION       Family History   Problem Relation Age of Onset    Breast cancer Mother 32    BRCA1 Negative Mother     BRCA2 Negative Mother     Cancer Mother     No Known Problems Father     No Known Problems Sister     No Known Problems Maternal Grandmother     No Known Problems Maternal Grandfather     No Known Problems Paternal Grandmother     No Known Problems Paternal Grandfather     Breast cancer Maternal Aunt 66    No Known Problems Maternal Aunt     No Known Problems Maternal Aunt     No Known Problems Paternal Aunt       reports that she has never smoked. She has never used smokeless tobacco. She reports current alcohol use. She reports that she does not use drugs.  Current Outpatient Medications on File Prior to Visit   Medication Sig Dispense Refill    acetaminophen (TYLENOL) 325 mg tablet Take 2 tablets by mouth every 4 (four) hours as needed for mild pain 30 tablet 0    busPIRone (BUSPAR) 10 mg tablet take 1 tablet by mouth every morning 90 tablet 1    Cholecalciferol (VITAMIN D-3) 1000 units CAPS Take 1 capsule by mouth daily        levothyroxine 175 mcg tablet Take 1 tablet (175 mcg total) by mouth daily 60 tablet 0    loratadine  "(CLARITIN) 10 mg tablet Take 10 mg by mouth daily      multivitamin (THERAGRAN) TABS Take 1 tablet by mouth daily      [DISCONTINUED] hydrOXYzine HCL (ATARAX) 25 mg tablet Take 1 tablet (25 mg total) by mouth every 6 (six) hours as needed for itching (Patient not taking: Reported on 1/20/2025) 30 tablet 0    [DISCONTINUED] magnesium 30 MG tablet Take 30 mg by mouth 2 (two) times a day       No current facility-administered medications on file prior to visit.   No Known Allergies     Objective   /86   Pulse 65   Temp 98.1 °F (36.7 °C)   Ht 5' 8\" (1.727 m)   Wt 98.4 kg (217 lb)   SpO2 98%   BMI 32.99 kg/m²     Physical Exam  Vitals and nursing note reviewed.   Constitutional:       General: She is not in acute distress.     Appearance: She is well-developed.   HENT:      Head: Normocephalic and atraumatic.      Right Ear: Hearing and tympanic membrane normal.      Left Ear: Hearing and tympanic membrane normal.      Nose: Nose normal.      Mouth/Throat:      Mouth: Mucous membranes are moist.      Dentition: Normal dentition.      Tongue: No lesions.      Pharynx: Oropharynx is clear. Uvula midline. No oropharyngeal exudate.      Tonsils: No tonsillar exudate.   Eyes:      Extraocular Movements: Extraocular movements intact.      Conjunctiva/sclera: Conjunctivae normal.   Neck:      Vascular: No carotid bruit or JVD.   Cardiovascular:      Rate and Rhythm: Normal rate and regular rhythm.      Heart sounds: S1 normal and S2 normal. No murmur heard.  Pulmonary:      Effort: Pulmonary effort is normal. No tachypnea or respiratory distress.      Breath sounds: Normal breath sounds and air entry. No decreased breath sounds.   Chest:      Chest wall: No deformity or tenderness.   Abdominal:      General: Abdomen is flat. Bowel sounds are normal. There is no distension.      Palpations: Abdomen is soft.      Tenderness: There is no abdominal tenderness. There is no right CVA tenderness, left CVA tenderness or " guarding.   Musculoskeletal:         General: No swelling.      Cervical back: Full passive range of motion without pain and neck supple.      Right lower leg: No edema.      Left lower leg: No edema.   Lymphadenopathy:      Cervical: No cervical adenopathy.   Skin:     General: Skin is warm and dry.      Capillary Refill: Capillary refill takes less than 2 seconds.      Findings: No rash.   Neurological:      Mental Status: She is alert and oriented to person, place, and time.      Cranial Nerves: Cranial nerves 2-12 are intact.      Sensory: Sensation is intact.      Motor: Motor function is intact.      Coordination: Coordination is intact.      Gait: Gait is intact.   Psychiatric:         Mood and Affect: Mood normal.         Behavior: Behavior is cooperative.

## 2025-01-20 NOTE — ASSESSMENT & PLAN NOTE
Most recent labs TSH 0.025/T4 1.18.  Levo lowered to 175 mcg daily.  Repeat labs in 8 weeks.  Counseled.

## 2025-01-23 ENCOUNTER — TELEPHONE (OUTPATIENT)
Age: 40
End: 2025-01-23

## 2025-01-23 NOTE — TELEPHONE ENCOUNTER
Pt called back to see if her surgery was rescheduled. Spoke to Bridgette and they are working on getting dates. Bridgette will call patient when she has the dates available to discuss.

## 2025-01-23 NOTE — TELEPHONE ENCOUNTER
Patient called to reschedule surgery that was cancelled for today. Please call her at [phone] to reschedule with Dr. Aguilar

## 2025-01-23 NOTE — TELEPHONE ENCOUNTER
Spoke to patient to reschedule her surgery, unable to take the date offered, unavailable next week.

## 2025-02-04 ENCOUNTER — TELEPHONE (OUTPATIENT)
Age: 40
End: 2025-02-04

## 2025-02-04 NOTE — TELEPHONE ENCOUNTER
Pt calling in to inquire about picking up more soap for surgery 2/7/25 with Dr. Aguilar.   Checked with office staff and advised pt they may come in during office hours to  the soap. No other questions at this time.

## 2025-02-07 ENCOUNTER — ANESTHESIA (OUTPATIENT)
Dept: PERIOP | Facility: HOSPITAL | Age: 40
End: 2025-02-07
Payer: COMMERCIAL

## 2025-02-07 ENCOUNTER — APPOINTMENT (OUTPATIENT)
Dept: RADIOLOGY | Facility: HOSPITAL | Age: 40
End: 2025-02-07
Payer: COMMERCIAL

## 2025-02-07 ENCOUNTER — HOSPITAL ENCOUNTER (OUTPATIENT)
Facility: HOSPITAL | Age: 40
Setting detail: OUTPATIENT SURGERY
Discharge: HOME | End: 2025-02-07
Attending: SURGERY | Admitting: SURGERY
Payer: COMMERCIAL

## 2025-02-07 ENCOUNTER — ANESTHESIA EVENT (OUTPATIENT)
Dept: PERIOP | Facility: HOSPITAL | Age: 40
End: 2025-02-07
Payer: COMMERCIAL

## 2025-02-07 VITALS
BODY MASS INDEX: 32.89 KG/M2 | HEART RATE: 82 BPM | WEIGHT: 217 LBS | DIASTOLIC BLOOD PRESSURE: 55 MMHG | OXYGEN SATURATION: 99 % | HEIGHT: 68 IN | TEMPERATURE: 98.8 F | SYSTOLIC BLOOD PRESSURE: 118 MMHG | RESPIRATION RATE: 18 BRPM

## 2025-02-07 DIAGNOSIS — Z12.39 BREAST CANCER SCREENING, HIGH RISK PATIENT: ICD-10-CM

## 2025-02-07 DIAGNOSIS — D24.1 FIBROADENOMA OF BREAST, RIGHT: Primary | ICD-10-CM

## 2025-02-07 LAB
EXT PREGNANCY TEST URINE: NEGATIVE
EXT. CONTROL: NORMAL

## 2025-02-07 PROCEDURE — 19125 EXCISION BREAST LESION: CPT | Performed by: PHYSICIAN ASSISTANT

## 2025-02-07 PROCEDURE — NC001 PR NO CHARGE: Performed by: SURGERY

## 2025-02-07 PROCEDURE — 19125 EXCISION BREAST LESION: CPT | Performed by: SURGERY

## 2025-02-07 PROCEDURE — 88307 TISSUE EXAM BY PATHOLOGIST: CPT | Performed by: PATHOLOGY

## 2025-02-07 PROCEDURE — 88342 IMHCHEM/IMCYTCHM 1ST ANTB: CPT | Performed by: PATHOLOGY

## 2025-02-07 PROCEDURE — 81025 URINE PREGNANCY TEST: CPT | Performed by: STUDENT IN AN ORGANIZED HEALTH CARE EDUCATION/TRAINING PROGRAM

## 2025-02-07 PROCEDURE — 88341 IMHCHEM/IMCYTCHM EA ADD ANTB: CPT | Performed by: PATHOLOGY

## 2025-02-07 RX ORDER — DEXAMETHASONE SODIUM PHOSPHATE 10 MG/ML
INJECTION, SOLUTION INTRAMUSCULAR; INTRAVENOUS AS NEEDED
Status: DISCONTINUED | OUTPATIENT
Start: 2025-02-07 | End: 2025-02-07

## 2025-02-07 RX ORDER — OXYCODONE HYDROCHLORIDE 5 MG/1
5 TABLET ORAL EVERY 4 HOURS PRN
Qty: 10 TABLET | Refills: 0 | Status: SHIPPED | OUTPATIENT
Start: 2025-02-07 | End: 2025-02-10

## 2025-02-07 RX ORDER — MIDAZOLAM HYDROCHLORIDE 2 MG/2ML
INJECTION, SOLUTION INTRAMUSCULAR; INTRAVENOUS AS NEEDED
Status: DISCONTINUED | OUTPATIENT
Start: 2025-02-07 | End: 2025-02-07

## 2025-02-07 RX ORDER — LIDOCAINE HYDROCHLORIDE AND EPINEPHRINE 10; 10 MG/ML; UG/ML
INJECTION, SOLUTION INFILTRATION; PERINEURAL AS NEEDED
Status: DISCONTINUED | OUTPATIENT
Start: 2025-02-07 | End: 2025-02-07 | Stop reason: HOSPADM

## 2025-02-07 RX ORDER — ONDANSETRON 2 MG/ML
INJECTION INTRAMUSCULAR; INTRAVENOUS AS NEEDED
Status: DISCONTINUED | OUTPATIENT
Start: 2025-02-07 | End: 2025-02-07

## 2025-02-07 RX ORDER — OXYCODONE HYDROCHLORIDE 5 MG/1
5 TABLET ORAL EVERY 4 HOURS PRN
Refills: 0 | Status: DISCONTINUED | OUTPATIENT
Start: 2025-02-07 | End: 2025-02-07

## 2025-02-07 RX ORDER — FENTANYL CITRATE/PF 50 MCG/ML
25 SYRINGE (ML) INJECTION
Refills: 0 | Status: CANCELLED | OUTPATIENT
Start: 2025-02-07

## 2025-02-07 RX ORDER — SODIUM CHLORIDE, SODIUM LACTATE, POTASSIUM CHLORIDE, CALCIUM CHLORIDE 600; 310; 30; 20 MG/100ML; MG/100ML; MG/100ML; MG/100ML
125 INJECTION, SOLUTION INTRAVENOUS CONTINUOUS
Status: DISCONTINUED | OUTPATIENT
Start: 2025-02-07 | End: 2025-02-07 | Stop reason: HOSPADM

## 2025-02-07 RX ORDER — LIDOCAINE HYDROCHLORIDE 10 MG/ML
0.5 INJECTION, SOLUTION EPIDURAL; INFILTRATION; INTRACAUDAL; PERINEURAL ONCE AS NEEDED
Status: DISCONTINUED | OUTPATIENT
Start: 2025-02-07 | End: 2025-02-07 | Stop reason: HOSPADM

## 2025-02-07 RX ORDER — GLYCOPYRROLATE 0.2 MG/ML
INJECTION INTRAMUSCULAR; INTRAVENOUS AS NEEDED
Status: DISCONTINUED | OUTPATIENT
Start: 2025-02-07 | End: 2025-02-07

## 2025-02-07 RX ORDER — CEFAZOLIN SODIUM 2 G/50ML
2000 SOLUTION INTRAVENOUS ONCE
Status: COMPLETED | OUTPATIENT
Start: 2025-02-07 | End: 2025-02-07

## 2025-02-07 RX ORDER — ONDANSETRON 2 MG/ML
4 INJECTION INTRAMUSCULAR; INTRAVENOUS ONCE AS NEEDED
Status: CANCELLED | OUTPATIENT
Start: 2025-02-07

## 2025-02-07 RX ORDER — OXYCODONE HYDROCHLORIDE 5 MG/1
5 TABLET ORAL ONCE AS NEEDED
Refills: 0 | Status: DISCONTINUED | OUTPATIENT
Start: 2025-02-07 | End: 2025-02-07 | Stop reason: HOSPADM

## 2025-02-07 RX ORDER — SCOPOLAMINE 1 MG/3D
1 PATCH, EXTENDED RELEASE TRANSDERMAL
Status: DISCONTINUED | OUTPATIENT
Start: 2025-02-07 | End: 2025-02-07 | Stop reason: HOSPADM

## 2025-02-07 RX ORDER — BUPIVACAINE HYDROCHLORIDE 5 MG/ML
INJECTION, SOLUTION EPIDURAL; INTRACAUDAL AS NEEDED
Status: DISCONTINUED | OUTPATIENT
Start: 2025-02-07 | End: 2025-02-07 | Stop reason: HOSPADM

## 2025-02-07 RX ORDER — MAGNESIUM HYDROXIDE 1200 MG/15ML
LIQUID ORAL AS NEEDED
Status: DISCONTINUED | OUTPATIENT
Start: 2025-02-07 | End: 2025-02-07 | Stop reason: HOSPADM

## 2025-02-07 RX ORDER — FENTANYL CITRATE 50 UG/ML
INJECTION, SOLUTION INTRAMUSCULAR; INTRAVENOUS AS NEEDED
Status: DISCONTINUED | OUTPATIENT
Start: 2025-02-07 | End: 2025-02-07

## 2025-02-07 RX ORDER — SODIUM CHLORIDE, SODIUM LACTATE, POTASSIUM CHLORIDE, CALCIUM CHLORIDE 600; 310; 30; 20 MG/100ML; MG/100ML; MG/100ML; MG/100ML
INJECTION, SOLUTION INTRAVENOUS CONTINUOUS PRN
Status: DISCONTINUED | OUTPATIENT
Start: 2025-02-07 | End: 2025-02-07

## 2025-02-07 RX ORDER — KETAMINE HCL IN NACL, ISO-OSM 100MG/10ML
SYRINGE (ML) INJECTION AS NEEDED
Status: DISCONTINUED | OUTPATIENT
Start: 2025-02-07 | End: 2025-02-07

## 2025-02-07 RX ORDER — PROPOFOL 10 MG/ML
INJECTION, EMULSION INTRAVENOUS AS NEEDED
Status: DISCONTINUED | OUTPATIENT
Start: 2025-02-07 | End: 2025-02-07

## 2025-02-07 RX ORDER — ACETAMINOPHEN 325 MG/1
650 TABLET ORAL EVERY 6 HOURS PRN
Status: DISCONTINUED | OUTPATIENT
Start: 2025-02-07 | End: 2025-02-07 | Stop reason: HOSPADM

## 2025-02-07 RX ORDER — SODIUM CHLORIDE, SODIUM LACTATE, POTASSIUM CHLORIDE, CALCIUM CHLORIDE 600; 310; 30; 20 MG/100ML; MG/100ML; MG/100ML; MG/100ML
20 INJECTION, SOLUTION INTRAVENOUS CONTINUOUS
Status: CANCELLED | OUTPATIENT
Start: 2025-02-07

## 2025-02-07 RX ADMIN — CEFAZOLIN SODIUM 2000 MG: 2 SOLUTION INTRAVENOUS at 11:12

## 2025-02-07 RX ADMIN — ONDANSETRON 4 MG: 2 INJECTION INTRAMUSCULAR; INTRAVENOUS at 11:03

## 2025-02-07 RX ADMIN — FENTANYL CITRATE 25 MCG: 50 INJECTION, SOLUTION INTRAMUSCULAR; INTRAVENOUS at 11:45

## 2025-02-07 RX ADMIN — SCOPOLAMINE 1 PATCH: 1.5 PATCH, EXTENDED RELEASE TRANSDERMAL at 10:53

## 2025-02-07 RX ADMIN — MIDAZOLAM 2 MG: 1 INJECTION INTRAMUSCULAR; INTRAVENOUS at 11:01

## 2025-02-07 RX ADMIN — ACETAMINOPHEN 650 MG: 325 TABLET ORAL at 13:11

## 2025-02-07 RX ADMIN — DEXAMETHASONE SODIUM PHOSPHATE 10 MG: 10 INJECTION, SOLUTION INTRAMUSCULAR; INTRAVENOUS at 11:10

## 2025-02-07 RX ADMIN — PROPOFOL 200 MG: 10 INJECTION, EMULSION INTRAVENOUS at 11:10

## 2025-02-07 RX ADMIN — FENTANYL CITRATE 50 MCG: 50 INJECTION, SOLUTION INTRAMUSCULAR; INTRAVENOUS at 11:18

## 2025-02-07 RX ADMIN — FENTANYL CITRATE 25 MCG: 50 INJECTION, SOLUTION INTRAMUSCULAR; INTRAVENOUS at 12:01

## 2025-02-07 RX ADMIN — FENTANYL CITRATE 50 MCG: 50 INJECTION, SOLUTION INTRAMUSCULAR; INTRAVENOUS at 11:10

## 2025-02-07 RX ADMIN — ONDANSETRON 4 MG: 2 INJECTION INTRAMUSCULAR; INTRAVENOUS at 12:11

## 2025-02-07 RX ADMIN — Medication 25 MG: at 11:30

## 2025-02-07 RX ADMIN — SODIUM CHLORIDE, SODIUM LACTATE, POTASSIUM CHLORIDE, AND CALCIUM CHLORIDE: .6; .31; .03; .02 INJECTION, SOLUTION INTRAVENOUS at 11:01

## 2025-02-07 RX ADMIN — FENTANYL CITRATE 25 MCG: 50 INJECTION, SOLUTION INTRAMUSCULAR; INTRAVENOUS at 11:41

## 2025-02-07 RX ADMIN — Medication 25 MG: at 11:16

## 2025-02-07 RX ADMIN — FENTANYL CITRATE 25 MCG: 50 INJECTION, SOLUTION INTRAMUSCULAR; INTRAVENOUS at 12:10

## 2025-02-07 RX ADMIN — SODIUM CHLORIDE, SODIUM LACTATE, POTASSIUM CHLORIDE, AND CALCIUM CHLORIDE: .6; .31; .03; .02 INJECTION, SOLUTION INTRAVENOUS at 12:18

## 2025-02-07 RX ADMIN — GLYCOPYRROLATE 0.2 MG: 0.2 INJECTION INTRAMUSCULAR; INTRAVENOUS at 11:06

## 2025-02-07 NOTE — ANESTHESIA POSTPROCEDURE EVALUATION
Post-Op Assessment Note    CV Status:  Stable  Pain Score: 0    Pain management: adequate       Mental Status:  Sleepy and arousable   Hydration Status:  Euvolemic   PONV Controlled:  Controlled   Airway Patency:  Patent     Post Op Vitals Reviewed: Yes    No anethesia notable event occurred.    Staff: CRNA           Last Filed PACU Vitals:  Vitals Value Taken Time   Temp 98    Pulse 78    /56    Resp 12    SpO2 99

## 2025-02-07 NOTE — H&P
Will jen right breast and check hernán. Proceed to OR as planned.        Consult - Surgical Oncology  Sydnie Peters 39 y.o. 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 Of note, an indeterminate right axillary lymph node was identified on workup and biopsied as benign and concordant. Standard clips in place at both the breast lesion and the right axillary node.     Discussed the differential diagnosis of fibroepithelial neoplasm and the likelihood of benign disease at the time of surgical excision. Discussed the more rare but possible diagnoses of benign and malignant Phyllodes tumor. We are both in agreement to proceed with a right breast surgical excisional biopsy with hernán  localization to entirely remove the lesion and confirm the diagnosis. She will need a hernán reflector placed. We discussed risks and benefits including bleeding, infection, recurrence, need for further procedures based on final pathology. Informed consent signed, all questions answered.      She had a vascular procedure in the recent past and overall recovered well but did have trouble with postoperative nausea and vomiting. She will discuss this with the anesthesia team on the day of her procedure.     She will be due for her next mammogram in 3/2025. We will finalize her plan for this upon review of her surgical pathology.     She reports that her mother was BRCA negative, we may want to pursue a genetics evaluation to ensure that a detailed multigene panel evaluation is performed beyond the BRCA testing that her mother had, will revisit this after surgical pathology reviewed.         History of Present Illness      Chief Complaint   Patient presents with    Breast Complaint      High risk  screening with GYN, mammogram normal 3/2024. MRI 10/2024 showed a 9mm mass in the right lower outer breast at 8 oclock and bilateral breast cysts. US showed a correlate at 8 oclock, 7cm from the nipple measuring 8 x 5 x 7mm and an an indeterminate right axillary lymph node with 5mm cortex. Biopsies performed. Breast lesion is a cellular fibroepithelial neoplasm, no atypia, no malignancy, cannot rule out benign Phyllodes. Axilla benign and concordant. Standard biopsy clips left in both locations.     Menarche - 9  Pregnancies - 2  Age at youngest pregnancy - 30  OCP - yes   Menopause - N/A  HRT - no  Personal - prior abnormal pap/colposcopy, no skin procedures, no colonoscopies, no prior breast or axillary surgeries, this was her first breast biopsy  Family - mother with breast cancer twice at 30 and 54, BRCA negative, had bilateral mastectomies, maternal aunt with breast in her 60's           Review of Systems   Constitutional: Negative.    Skin:         Right breast mass   Hematological: Negative.    All other systems reviewed and are negative.        Historical Information  Medical History        Past Medical History:   Diagnosis Date    Abnormal Pap smear of cervix      Anxiety      Disease of thyroid gland      Exercise involving walking      Female infertility       IUI pregnancy    History of COVID-19 09/2021     recovered at home    Right knee injury 2019    Varicella      Varicose vein of leg           Surgical History         Past Surgical History:   Procedure Laterality Date    BUNIONECTOMY Left      COLPOSCOPY        SD ENDOVEN ABLTJ INCMPTNT VEIN XTR LASER 1ST VEIN Right 8/18/2023     Procedure: R GSV EVLT and stab phlebectomies x 39;  Surgeon: Mitzi Campbell MD;  Location: [facility] OR;  Service: Vascular    US GUIDED BREAST BIOPSY RIGHT COMPLETE Right 11/12/2024    US GUIDED BREAST LYMPH NODE BIOPSY RIGHT Right 11/12/2024    WISDOM TOOTH EXTRACTION             Social History  Social History            Substance and Sexual Activity   Alcohol Use Yes     Comment: social      Social History          Substance and Sexual Activity   Drug Use No      Tobacco Use History   Social History          Tobacco Use   Smoking Status Never   Smokeless Tobacco Never         Family History         Family History   Problem Relation Age of Onset    Breast cancer Mother 32    BRCA1 Negative Mother      BRCA2 Negative Mother      No Known Problems Father      No Known Problems Sister      No Known Problems Maternal Grandmother      No Known Problems Maternal Grandfather      No Known Problems Paternal Grandmother      No Known Problems Paternal Grandfather      Breast cancer Maternal Aunt 66    No Known Problems Maternal Aunt      No Known Problems Maternal Aunt      No Known Problems Paternal Aunt              Meds/Allergies    Current Medications      Current Outpatient Medications:     acetaminophen (TYLENOL) 325 mg tablet, Take 2 tablets by mouth every 4 (four) hours as needed for mild pain, Disp: 30 tablet, Rfl: 0    busPIRone (BUSPAR) 10 mg tablet, take 1 tablet by mouth every morning, Disp: 90 tablet, Rfl: 0    Cholecalciferol (VITAMIN D-3) 1000 units CAPS, Take 1 capsule by mouth daily  , Disp: , Rfl:     hydrOXYzine HCL (ATARAX) 25 mg tablet, Take 1 tablet (25 mg total) by mouth every 6 (six) hours as needed for itching, Disp: 30 tablet, Rfl: 0    levothyroxine 200 mcg tablet, Take 1 tablet (200 mcg total) by mouth daily in the early morning, Disp: 90 tablet, Rfl: 3    multivitamin (THERAGRAN) TABS, Take 1 tablet by mouth daily, Disp: , Rfl:     magnesium 30 MG tablet, Take 30 mg by mouth 2 (two) times a day, Disp: , Rfl:      Allergies   No Known Allergies        The following portions of the patient's history were reviewed and updated as appropriate: allergies, current medications, past family history, past medical history, past social history, past surgical history, and problem list.     Objective  Current Vitals:    Vitals:    02/07/25 1017   BP: 104/59   Pulse: 73   Resp: 18   Temp: (!) 97.1 °F (36.2 °C)   SpO2: 94%       Physical Exam  Vitals reviewed.   Constitutional:       General: She is not in acute distress.     Appearance: She is not toxic-appearing.   HENT:      Head: Normocephalic.      Nose: No congestion.      Mouth/Throat:      Mouth: Mucous membranes are moist.   Eyes:      Pupils: Pupils are equal, round, and reactive to light.   Cardiovascular:      Rate and Rhythm: Normal rate.   Pulmonary:      Effort: Pulmonary effort is normal. No respiratory distress.   Abdominal:      Palpations: Abdomen is soft.   Musculoskeletal:         General: Normal range of motion.      Cervical back: Normal range of motion and neck supple.   Lymphadenopathy:      Cervical: No cervical adenopathy.   Skin:     General: Skin is warm and dry.      Capillary Refill: Capillary refill takes less than 2 seconds.   Neurological:      General: No focal deficit present.      Mental Status: She is alert.   Psychiatric:         Mood and Affect: Mood normal.      The patient has no palpable cervical, supraclavicular, or axillary lymphadenopathy bilaterally.  The breasts are symmetric.  There are no dominant lumps, masses, skin changes or nipple retraction bilaterally.

## 2025-02-07 NOTE — ANESTHESIA POSTPROCEDURE EVALUATION
Post-Op Assessment Note            No anethesia notable event occurred.    Staff: Anesthesiologist           Patient did note that the front right of her tongue was sore following anesthesia today. She was noted to have a very small abnormality on the front right tip of her tongue that appear to be likely due to tube compression. She states that she had some soreness but no other symptoms following this.     Last Filed PACU Vitals:  Vitals Value Taken Time   Temp 98.8 °F (37.1 °C) 02/07/25 1232   Pulse 90 02/07/25 1305   /73 02/07/25 1305   Resp 16 02/07/25 1305   SpO2 100 % 02/07/25 1305   Vitals shown include unfiled device data.    Modified Sree:     Vitals Value Taken Time   Activity 2 02/07/25 1300   Respiration 2 02/07/25 1300   Circulation 2 02/07/25 1300   Consciousness 2 02/07/25 1300   Oxygen Saturation 2 02/07/25 1300     Modified Sree Score: 10

## 2025-02-07 NOTE — DISCHARGE INSTR - AVS FIRST PAGE
DISCHARGE INSTRUCTIONS:    FOLLOW UP APPOINTMENT: You have a follow up appointment scheduled with Dr. Aguilar on 2/20 at 10:00am. Please call the office as soon as possible if you cannot make this appointment and need to reschedule.     INCISION SITES:  - It is normal to have some bruising, swelling or mild discoloration around the incision.  If increasing redness or pain develops, call our office immediately.   -Do not apply any creams, lotions, or ointments unless instructed to do so by your surgeon.  - wear surgical bra at all times  If you have surgical adhesive glue:  - The incisions are closed with dissolvable sutures underneath the skin and a surgical adhesive glue overlying the skin.  - Do not pick at the adhesive. It will naturally wear off with time.    WOUND CARE:  -Avoid wearing tight adhering, irritative clothing during your healing process.   -You may gently shower 24 hours after surgery, let water and soap run down and pat dry; do not scrub the incision sites.   -No baths, hot tubs, or swimming x 6 weeks or until otherwise cleared by surgical team.    PAIN CONTROL/MEDICATIONS:  -Recommend taking over the counter pain medication such as Tylenol OR Ibuprofen first to help with pain; read and follow labels on bottles.  -Only use prescribed pain medications as needed and try to taper down use overtime. Do not drive or operate heavy machinery while on prescription pain medications. Do not take with alcohol.  -You may apply ice at the incision site as needed for 20-30 minutes on/off to decrease pain or swelling.     DIET/LIFE HABITS:  -Advance diet as tolerated. Stay hydrated.   -No smoking at least 2 weeks post surgery, this can delay wound healing. Smoking cessation is encouraged and we can provide you with options to facilitate cessation.    ACTIVITY/RESTRICTIONS:  -No strenuous exercise and no heavy lifting, pulling, or pushing >10-15 lbs x 3 weeks or until cleared by surgeon.  -Gradually increase your  activity daily.   -No driving x24 hours after your procedure, then no driving until your pain is well controlled without need for narcotic medications and you can wear a seatbelt comfortably   No driving if taking prescription pain medication.       RETURN TO WORK:  -You may return to work or other activities as soon as your pain is controlled and you feel comfortable. For many people, this is a few days after surgery. If your job requires heavy lifting you will need to be on light duty for 2-3 weeks.     CALL THE OFFICE IF/RETURN TO ED:  -Fevers/chills with uncontrolled temperature > 100.4 F.  -Increasing severe pain uncontrolled with pain medicine.  -Incision site is having thick, yellow drainage, increasing redness, is warm to the touch, or becoming increasingly tender.  -Unexplained bleeding.  -Any changes in overall artie such as: nausea/vomitting, fevers/chills, diarrhea/constipation, inability/difficulty urinating, chest pains, palpations, trouble breathing, coughing, excessive fatigue/weakness, etc.

## 2025-02-07 NOTE — ANESTHESIA PREPROCEDURE EVALUATION
Procedure:  RIGHT BREAST SURGICAL EXCISIONAL BIOPSY WITH LAY  LOCALIZATION (Right: Breast)    Relevant Problems   CARDIO   (+) Hypercholesterolemia   (+) Varicose veins of right lower extremity with pain   (+) Venous insufficiency of right lower extremity      ENDO   (+) Hypothyroidism      NEURO/PSYCH   (+) Anxiety      PULMONARY   (+) Mild intermittent asthma without complication        Physical Exam    Airway    Mallampati score: III  TM Distance: >3 FB  Neck ROM: full     Dental       Cardiovascular  Cardiovascular exam normal    Pulmonary  Pulmonary exam normal     Other Findings  post-pubertal.      Anesthesia Plan  ASA Score- 2     Anesthesia Type- general with ASA Monitors.         Additional Monitors:     Airway Plan: LMA.           Plan Factors-Exercise tolerance (METS): >4 METS.    Chart reviewed. EKG reviewed.  Existing labs reviewed. Patient summary reviewed.          Obstructive sleep apnea risk education given perioperatively.        Induction- intravenous.    Postoperative Plan- Plan for postoperative opioid use. Planned trial extubation    Perioperative Resuscitation Plan - Level 1 - Full Code.       Informed Consent- Anesthetic plan and risks discussed with patient and spouse.  I personally reviewed this patient with the CRNA. Discussed and agreed on the Anesthesia Plan with the CRNA..      NPO Status:  Vitals Value Taken Time   Date of last liquid 02/06/25 02/07/25 1018   Time of last liquid 2030 02/07/25 1018   Date of last solid 02/06/25 02/07/25 1018   Time of last solid 2030 02/07/25 1018

## 2025-02-07 NOTE — OP NOTE
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]    Post-Op Diagnosis Codes:     * Fibroadenoma of breast, right [D24.1]    Procedure(s):  Right - RIGHT BREAST SURGICAL EXCISIONAL BIOPSY WITH HERNÁN  LOCALIZATION    Specimen(s):  ID Type Source Tests Collected by Time Destination   1 : RIGHT BREAST EXCISIONAL BX, INKED PER PROTOCOL WITH HERNÁN Tissue Breast, Right TISSUE EXAM Jerzy Aguilar MD 2025 1152      Estimated Blood Loss:   Minimal    Anesthesia Type:   General  Local    Operative Indications:  Fibroadenoma of breast, right [D24.1]  39F with a biopsy proven cellular fibroepithelial lesion, consented for a right breast surgical excisional biopsy with hernán  localization for diagnostic confirmation.    Operative Findings:  -Palpable lesion, biopsy clip, hernán reflector all within the specimen  -Palpable lesion was located centrally within the specimen despite the clips being skewed to the periphery    Complications:   None    Procedure and Technique:  The patient was seen in holding, hernán reflector checked and marked on the right side, taken to OR, pneumoboots on and activated, perioperative antibiotics given. General anesthesia induced. Right breast prepped and draped in the usual sterile fashion, surgical timeout performed. Curvilinear incision marked overlying the area of highest hernán activity. Local instilled, incision made, small skin flap made on one side, dissection carried down posteriorly as the lesion was deep. Surgical excisional biopsy specimen created containing the hernán reflector and the palpable lesion. Specimen inked per protocol and imaged. Biopsy clip, lesion, hernán reflector all present. Wound bed irrigated, hemostasis achieved, local  instilled, skin closed with interrupted 3-0 vicryl and running 4-0 monocryl. All counts correct. Skin cleansed, sterile dressings applied, fluffs and a compressive surgical bra placed. Patient awoken from anesthesia and taken to PACU in stable condition. I was present for the entire procedure and a physician assistant was required during the procedure for retraction, tissue handling, dissection and suturing.    Patient Disposition:  PACU     This procedure was not performed to treat breast cancer through sentinel node biopsy        SIGNATURE: Jerzy Aguilar MD  DATE: February 7, 2025  TIME: 12:18 PM

## 2025-02-12 ENCOUNTER — RESULTS FOLLOW-UP (OUTPATIENT)
Dept: SURGERY | Facility: CLINIC | Age: 40
End: 2025-02-12

## 2025-02-12 DIAGNOSIS — D05.01 BREAST NEOPLASM, TIS (LCIS), RIGHT: ICD-10-CM

## 2025-02-12 DIAGNOSIS — N60.91 ATYPICAL LOBULAR HYPERPLASIA OF RIGHT BREAST: ICD-10-CM

## 2025-02-12 DIAGNOSIS — Z12.39 BREAST CANCER SCREENING, HIGH RISK PATIENT: ICD-10-CM

## 2025-02-12 DIAGNOSIS — Z84.89 FAMILY HISTORY OF NEOPLASM OF BREAST: Primary | ICD-10-CM

## 2025-02-12 PROCEDURE — 88341 IMHCHEM/IMCYTCHM EA ADD ANTB: CPT | Performed by: PATHOLOGY

## 2025-02-12 PROCEDURE — 88307 TISSUE EXAM BY PATHOLOGIST: CPT | Performed by: PATHOLOGY

## 2025-02-12 PROCEDURE — 88342 IMHCHEM/IMCYTCHM 1ST ANTB: CPT | Performed by: PATHOLOGY

## 2025-02-12 NOTE — RESULT ENCOUNTER NOTE
"Pathology reviewed, all results are benign which is good news. There is some lobular neoplasia present, we will discuss this in detail at our visit. The best way to summarize it is that there are some atypical cells present. These atypical cells are an incidental finding, not expected, not the reason for the \"mass.\" The mass was a benign fibroadenoma, but now we consider it to be benign fibroadenoma with atypia so I am glad we removed it.     LCIS or lobular carcinoma in situ is NOT BREAST CANCER - but it is a precursor to cancer and a high risk atypical finding.    Please check on her recovery and incision and confirm postop.    I am recommending formal consultation with genetics team for genetic testing based on family history of cancer and now personal history of atypia.     We will want her to continue follow up with me and continue the alternating MRI/mammogram pathway she is on. We will go over everything in the office at her visit and options for medication management as well for risk reduction. "

## 2025-02-20 ENCOUNTER — OFFICE VISIT (OUTPATIENT)
Dept: SURGERY | Facility: CLINIC | Age: 40
End: 2025-02-20

## 2025-02-20 VITALS
SYSTOLIC BLOOD PRESSURE: 104 MMHG | HEIGHT: 68 IN | OXYGEN SATURATION: 99 % | BODY MASS INDEX: 32.74 KG/M2 | HEART RATE: 76 BPM | WEIGHT: 216 LBS | TEMPERATURE: 97.8 F | DIASTOLIC BLOOD PRESSURE: 62 MMHG

## 2025-02-20 DIAGNOSIS — N60.91 ATYPICAL LOBULAR HYPERPLASIA (ALH) OF RIGHT BREAST: ICD-10-CM

## 2025-02-20 DIAGNOSIS — Z12.39 BREAST CANCER SCREENING, HIGH RISK PATIENT: ICD-10-CM

## 2025-02-20 DIAGNOSIS — D24.1 FIBROADENOMA OF BREAST, RIGHT: ICD-10-CM

## 2025-02-20 DIAGNOSIS — Z84.89 FAMILY HISTORY OF NEOPLASM OF BREAST: ICD-10-CM

## 2025-02-20 DIAGNOSIS — D05.01 NEOPLASM OF RIGHT BREAST, PRIMARY TUMOR STAGING CATEGORY TIS: LOBULAR CARCINOMA IN SITU (LCIS): Primary | ICD-10-CM

## 2025-02-20 PROCEDURE — 99024 POSTOP FOLLOW-UP VISIT: CPT | Performed by: SURGERY

## 2025-02-22 PROBLEM — N60.91 ATYPICAL LOBULAR HYPERPLASIA (ALH) OF RIGHT BREAST: Status: ACTIVE | Noted: 2025-02-22

## 2025-02-22 PROBLEM — D05.01 NEOPLASM OF RIGHT BREAST, PRIMARY TUMOR STAGING CATEGORY TIS: LOBULAR CARCINOMA IN SITU (LCIS): Status: ACTIVE | Noted: 2025-02-22

## 2025-02-22 NOTE — PROGRESS NOTES
Post-Op Note - Surgical Oncology   Sydnie Peters 39 y.o. 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 Of note, an indeterminate right axillary lymph node was identified on workup and biopsied as benign and concordant. She is now 2 weeks status post right breast surgical excisional biopsy with hernán  localization confirming benign fibroadenoma, no Phyllodes, incidental LCIS and ALH breast atypia identified. No malignancy.     We discussed the diagnosis of breast atypia and the goal of ongoing high risk screening in her case. We also discussed consideration for medical oncology consultation regarding chemoprevention/medical risk reduction. She would like to hold on this for now but understands this is an option to decrease her breast cancer risk if she is thought to be an appropriate candidate upon evaluation.     She reports that her mother was BRCA negative. I am recommending a formal genetics evaluation to ensure that a detailed multigene panel evaluation is performed beyond the BRCA testing that her mother had. She is in agreement and will schedule this. I will reach out to her once we have the genetics results.      She will be due for her next mammogram in 3/2025 but with her recent surgery I recommend that she schedule her screening mammogram in June 2025 and her bilateral MRI in December 2025. She would like to continue her high risk screening and breast exams with her GYN team and she will contact me in the future as needed for additional surgical oncology support.       Subjective      Chief Complaint   Patient presents with    Breast onc po      Patient is here for her po visit after having a Rt breast surgical exc bx with hernán on 02/17/25.Pt states the  "incision is healed and denies any drainage, redness/irritation or any bulging. Pt has mild pain and was treating her pain with tylenol and IBU but she states the pain is tolerable and she hasn't take pain meds in over a week. Pt developed a rash on right flank after procedure that she treated with lotion for dry skin and hydrocortisone. Rash is still mildly present and reports itchy under rt arm    breast onc po 2     Patient hasn't notice any new lumps, denies nipple discharge, breast swelling, or fevers/chills. Pt denies any issues on left breast      Recovering well, minimal pain, no signs of infection. Did have right flank and abdominal wall rash, likely from the dye in the chlorhexidine prep which we added as a sensitivity in her chart. Path showed no Phyllodes but incidental LCIS and ALH.       Review of Systems   Constitutional:  Negative for fever.   Skin:  Positive for rash and wound. Negative for color change.   Hematological: Negative.    Psychiatric/Behavioral:  The patient is nervous/anxious.    All other systems reviewed and are negative.      The following portions of the patient's history were reviewed and updated as appropriate: allergies, current medications, past family history, past medical history, past social history, past surgical history, and problem list.    Objective      Blood pressure 104/62, pulse 76, temperature 97.8 °F (36.6 °C), temperature source Temporal, height 5' 8\" (1.727 m), weight 98 kg (216 lb), SpO2 99%, not currently breastfeeding.   Physical Exam  Vitals reviewed.   Constitutional:       General: She is not in acute distress.     Appearance: She is not toxic-appearing.   HENT:      Head: Normocephalic.      Nose: No congestion.      Mouth/Throat:      Mouth: Mucous membranes are moist.   Eyes:      Pupils: Pupils are equal, round, and reactive to light.   Cardiovascular:      Rate and Rhythm: Normal rate.   Pulmonary:      Effort: Pulmonary effort is normal. No respiratory " distress.   Abdominal:      Palpations: Abdomen is soft.   Musculoskeletal:         General: Normal range of motion.      Cervical back: Normal range of motion.   Skin:     General: Skin is warm.      Capillary Refill: Capillary refill takes less than 2 seconds.      Comments: Right breast incision clean, dry, intact, no infection or wound breakdown    Right flank and abdominal wall resolving rash, appears consistent with contact dermatitis   Neurological:      General: No focal deficit present.      Mental Status: She is alert. Mental status is at baseline.   Psychiatric:         Mood and Affect: Mood normal.         Signature:  Jerzy Aguilar MD  Date: 2/22/2025 Time: 10:04 AM

## 2025-03-11 DIAGNOSIS — E03.9 ACQUIRED HYPOTHYROIDISM: ICD-10-CM

## 2025-03-12 RX ORDER — LEVOTHYROXINE SODIUM 175 UG/1
175 TABLET ORAL DAILY
Qty: 60 TABLET | Refills: 0 | OUTPATIENT
Start: 2025-03-12

## 2025-03-12 NOTE — TELEPHONE ENCOUNTER
Please have patient repeat thyroid labs as ordered and then I can refill this at the correct dose. Thank you!

## 2025-03-14 ENCOUNTER — APPOINTMENT (OUTPATIENT)
Dept: LAB | Facility: CLINIC | Age: 40
End: 2025-03-14
Payer: COMMERCIAL

## 2025-03-14 DIAGNOSIS — E03.9 ACQUIRED HYPOTHYROIDISM: ICD-10-CM

## 2025-03-14 LAB
T4 FREE SERPL-MCNC: 1.27 NG/DL (ref 0.61–1.12)
TSH SERPL DL<=0.05 MIU/L-ACNC: 0.02 UIU/ML (ref 0.45–4.5)

## 2025-03-14 PROCEDURE — 84443 ASSAY THYROID STIM HORMONE: CPT

## 2025-03-14 PROCEDURE — 36415 COLL VENOUS BLD VENIPUNCTURE: CPT

## 2025-03-14 PROCEDURE — 84439 ASSAY OF FREE THYROXINE: CPT

## 2025-03-17 ENCOUNTER — RESULTS FOLLOW-UP (OUTPATIENT)
Dept: FAMILY MEDICINE CLINIC | Facility: CLINIC | Age: 40
End: 2025-03-17

## 2025-03-17 DIAGNOSIS — E03.9 ACQUIRED HYPOTHYROIDISM: ICD-10-CM

## 2025-03-17 RX ORDER — LEVOTHYROXINE SODIUM 125 UG/1
125 TABLET ORAL DAILY
Qty: 60 TABLET | Refills: 0 | Status: SHIPPED | OUTPATIENT
Start: 2025-03-17

## 2025-04-10 ENCOUNTER — DOCUMENTATION (OUTPATIENT)
Dept: HEMATOLOGY ONCOLOGY | Facility: CLINIC | Age: 40
End: 2025-04-10

## 2025-05-07 ENCOUNTER — APPOINTMENT (OUTPATIENT)
Dept: LAB | Facility: CLINIC | Age: 40
End: 2025-05-07
Payer: COMMERCIAL

## 2025-05-07 ENCOUNTER — RESULTS FOLLOW-UP (OUTPATIENT)
Dept: FAMILY MEDICINE CLINIC | Facility: CLINIC | Age: 40
End: 2025-05-07

## 2025-05-07 DIAGNOSIS — E03.9 ACQUIRED HYPOTHYROIDISM: ICD-10-CM

## 2025-05-07 LAB — TSH SERPL DL<=0.05 MIU/L-ACNC: 1.64 UIU/ML (ref 0.45–4.5)

## 2025-05-07 PROCEDURE — 36415 COLL VENOUS BLD VENIPUNCTURE: CPT

## 2025-05-07 PROCEDURE — 84443 ASSAY THYROID STIM HORMONE: CPT

## 2025-05-08 RX ORDER — LEVOTHYROXINE SODIUM 125 UG/1
125 TABLET ORAL DAILY
Qty: 60 TABLET | Refills: 5 | Status: SHIPPED | OUTPATIENT
Start: 2025-05-08

## 2025-06-16 DIAGNOSIS — E03.9 ACQUIRED HYPOTHYROIDISM: ICD-10-CM

## 2025-06-16 DIAGNOSIS — F41.9 ANXIETY: ICD-10-CM

## 2025-06-16 NOTE — TELEPHONE ENCOUNTER
Reason for call:   [x] Refill   [] Prior Auth  [] Other:     Office:   [x] PCP/Provider - MAIKEL DE GUZMAN   [] Specialty/Provider -     Medication: levothyroxine 125 mcg tablet   125 mcg daily  90    busPIRone (BUSPAR) 10 mg tablet   10 mg, Every morning   90    Pharmacy: [facility]     Local Pharmacy   Does the patient have enough for 3 days?   [x] Yes   [] No - Send as HP to POD    Mail Away Pharmacy   Does the patient have enough for 10 days?   [] Yes   [x] No - Send as HP to POD

## 2025-06-17 RX ORDER — BUSPIRONE HYDROCHLORIDE 10 MG/1
10 TABLET ORAL EVERY MORNING
Qty: 90 TABLET | Refills: 0 | Status: SHIPPED | OUTPATIENT
Start: 2025-06-17

## 2025-06-17 RX ORDER — LEVOTHYROXINE SODIUM 125 UG/1
125 TABLET ORAL DAILY
Qty: 90 TABLET | Refills: 0 | Status: SHIPPED | OUTPATIENT
Start: 2025-06-17

## 2025-06-18 ENCOUNTER — HOSPITAL ENCOUNTER (OUTPATIENT)
Dept: MAMMOGRAPHY | Facility: HOSPITAL | Age: 40
Discharge: HOME | End: 2025-06-18
Payer: COMMERCIAL

## 2025-06-18 DIAGNOSIS — Z12.31 ENCOUNTER FOR SCREENING MAMMOGRAM FOR BREAST CANCER: ICD-10-CM

## 2025-06-18 PROCEDURE — 77067 SCR MAMMO BI INCL CAD: CPT

## 2025-06-18 PROCEDURE — 77063 BREAST TOMOSYNTHESIS BI: CPT

## 2025-07-02 NOTE — PROGRESS NOTES
Pre-Test Genetic Counseling Consult Note    Patient Name: Sydnie Peters   /Age: 1985/40 y.o.  Referring Provider: Jerzy Aguilar MD     Date of Service: 7/3/2025  Genetic Counselor: Jacquie Watson MS, [facility] [place]pretation Services: None  Location: In-person consult at [place]  Length of Visit: 30 minutes were spent on this date of service performing the following activities: preparing for visit, reviewing records, providing counseling and education, documenting    Sydnie was referred to the [facility] due to her personal history of ALH/LCIS and family history of breast cancer. she presents today to discuss the possibility of a hereditary cancer syndrome, options for genetic testing, and implications for her and her family.     Cancer History and Treatment:     Personal History of ALH and LCIS in right breast     Screening Hx:     Breast:  Breast Imaging: Screening mammogram (25) and MRI scheduled, ordered by OB/GYN  Breast Biopsy: See below   Breast Density: Heterogeneously dense    RISK ASSESSMENT:   5 Year Tyrer-Cuzick: 2.47%  10 Year Tyrer-Cuzick: 6.08%  Lifetime Tyrer-Cuzick: 39.2%    25  Final Diagnosis   A. Breast, Right, RIGHT BREAST EXCISIONAL BX, INKED PER PROTOCOL WITH LAY:  - Atypical lobular hyperplasia (ALH) (slides A2 and A21) and focally lobular carcinoma in situ (LCIS) (slide A2).  Immunostains with antibodies for E-cadherin, p120 catenin, AE1/AE3, smooth muscle myosin heavy chain and p63 were performed on block A2.  Immunostain pattern supports the diagnosis of lobular neoplasia. 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 Breast, Right, US BX RT BREAST [id number] PASSES 14G MARQUEE:  - Cellular fibroepithelial neoplasm.   - Negative for malignancy, in-situ carcinoma and atypical hyperplasia.    - See comment.     Comment: Sections show variable stromal cellularity with jose-epithelial condensation and lobulated circumscribed border. No mitoses are identified. The differential diagnosis includes cellular fibroadenoma and benign phyllodes tumor.     Immunohistochemistry for calponin is performed on tissue block A1 and A2  to help in the assessment of this case.     B. Axillary lymph node, US BX RT AXILLARY LN 3 PASSES 16G MARQUEE:  - Benign lymphoid and fibroadipose tissue.  - Negative for carcinoma.      Colon:  Colonoscopy: No prior colonoscopy     Gynecologic:  Ovaries and Uterus intact     Skin:  No current screening    Reproductive History  Age at menarche: 9  Age at first live birth: 30  Menopause: Premenopausal  Hormone replacement: N/A    Medical and Surgical History  Pertinent surgical history: Past Surgical History[1]   Pertinent medical history:Past Medical History[2]      Other History:  Height:   Ht Readings from Last 1 Encounters:   02/20/25 5' 8\" (1.727 m)     Weight:   Wt Readings from Last 1 Encounters:   02/20/25 98 kg (216 lb)     Relevant Family History         Please refer to the scanned pedigree in the Media Tab for a complete family history     *All history is reported as provided by the patient; records are not available for review, except where indicated.     Assessment:  We discussed sporadic, familial and hereditary cancer.  We also discussed the many factors that influence our risk for cancer such as age, environmental exposures, lifestyle choices and family history.      We reviewed the indications suggestive of a hereditary predisposition " to cancer.    Sydnie's mother, who was diagnosed with bilateral breast cancer, had genetic testing around 2015 and was negative.  We did not have a copy of this genetic test result for review during our appointment however we did discuss the possibility of her mother undergoing update testing.  Sydnie verbalized her understanding of this information.      Of note, While testing an affected family member is most informative, it is also appropriate to test unaffected family members who meet testing criteria (NCCN Guidelines for Genetic/Familial High-Risk Assessment: Breast, Ovarian, and Pancreatic).        Genetic testing is indicated for Sydnie based on the following criteria: Meets NCCN V3.2025 Testing Criteria for High-Penetrance Breast Cancer Susceptibility Genes: Family history of a mother (first-degree relative) with bilateral breast cancer (diagnosed at ages 32 and 56) along with a maternal aunt with breast cancer (diagnosed at age 65).     The risks, benefits, and limitations of genetic testing were reviewed with the patient, as well as genetic discrimination laws, and possible test results (positive, negative, variants of uncertain significance) and their clinical implications. If positive for a mutation, options for managing cancer risk including increased surveillance, chemoprevention, and in some cases prophylactic surgery were discussed. Sydnie was informed that if a hereditary cancer syndrome was identified in her, first degree relatives (parents, siblings, and children) have a chance of also inheriting the condition. Genetic testing would allow for predictive genetic testing in other relatives, who may also be at risk depending on their degree of relation.     Billing:  Most individuals pay <$100 for hereditary cancer genetic testing. If insurance covers the cost of the testing, individuals may still pay out of pocket secondary to co-pays, co-insurance, or deductibles. If the cost of the testing  exceeds $100, the lab will reach out to the patient via phone or e-mail. The patient will then have the option to proceed with the testing, cancel the testing, or elect the self-pay option of $250. Sydnie verbalized understanding.     Plan: Patient decided not to proceed with testing at this time.  Sydnie would like to discuss this information with her  and family prior to proceeding.  She can reach out to our office at [phone] option 3 with questions and/or if she decides to proceed with testing.  The test discussed today was the [facility] panels.  We discussed the availability of smaller more focused panel versus a larger multi-cancer panel.             [1]   Past Surgical History:  Procedure Laterality Date    BREAST BIOPSY Right 01/13/2025    hernán  right breast    BREAST CYST EXCISION Right     BUNIONECTOMY Left     COLPOSCOPY      MI BX BREAST W/DEVICE 1ST LESION MAGNETIC RES GUID Right 02/07/2025    Procedure: RIGHT BREAST SURGICAL EXCISIONAL BIOPSY WITH HERNÁN  LOCALIZATION;  Surgeon: Jerzy Aguilar MD;  Location: [facility];  Service: General    MI ENDOVEN ABLTJ INCMPTNT VEIN XTR LASER 1ST VEIN Right 08/18/2023    Procedure: R GSV EVLT and stab phlebectomies x 39;  Surgeon: Mitzi Campbell MD;  Location: [facility];  Service: Vascular    US BREAST CLIP NEEDLE LOC RIGHT Right 01/14/2025    US GUIDED BREAST BIOPSY RIGHT COMPLETE Right 11/12/2024    US GUIDED BREAST LYMPH NODE BIOPSY RIGHT Right 11/12/2024    WISDOM TOOTH EXTRACTION     [2]   Past Medical History:  Diagnosis Date    Abnormal Pap smear of cervix     Anxiety     Disease of thyroid gland     Exercise involving walking     Female infertility     IUI pregnancy    History of COVID-19 09/2021    recovered at home    PONV (postoperative nausea and vomiting)     Right knee injury 2019    Varicella     Varicose vein of leg

## 2025-07-03 ENCOUNTER — CONSULT (OUTPATIENT)
Dept: GENETICS | Facility: CLINIC | Age: 40
End: 2025-07-03
Payer: COMMERCIAL

## 2025-07-03 DIAGNOSIS — D05.01 BREAST NEOPLASM, TIS (LCIS), RIGHT: ICD-10-CM

## 2025-07-03 DIAGNOSIS — Z84.89 FAMILY HISTORY OF NEOPLASM OF BREAST: Primary | ICD-10-CM

## 2025-07-03 DIAGNOSIS — Z12.39 BREAST CANCER SCREENING, HIGH RISK PATIENT: ICD-10-CM

## 2025-07-03 DIAGNOSIS — N60.91 ATYPICAL LOBULAR HYPERPLASIA OF RIGHT BREAST: ICD-10-CM

## 2025-07-03 PROCEDURE — 96041 GENETIC COUNSELING SVC EA 30: CPT | Performed by: GENETIC COUNSELOR, MS

## 2025-08-20 ENCOUNTER — VBI (OUTPATIENT)
Dept: ADMINISTRATIVE | Facility: OTHER | Age: 40
End: 2025-08-20

## (undated) DEVICE — MICRO HVTSA, 0.5G AND HVTSA SOURCEMARK PRODUCT CODE M1206 AND M1206-01: Brand: EXOFIN MICRO HVTSA, 0.5G

## (undated) DEVICE — DRAPE INTESTINAL ISOLATION BAG

## (undated) DEVICE — SHEATH, GUIDE, SAVI SCOUT®: Brand: SAVI SCOUT®

## (undated) DEVICE — INTENDED FOR TISSUE SEPARATION, AND OTHER PROCEDURES THAT REQUIRE A SHARP SURGICAL BLADE TO PUNCTURE OR CUT.: Brand: BARD-PARKER ® CARBON RIB-BACK BLADES

## (undated) DEVICE — ABDOMINAL PAD: Brand: DERMACEA

## (undated) DEVICE — CHLORAPREP HI-LITE 26ML ORANGE

## (undated) DEVICE — PENCIL ELECTROSURG E-Z CLEAN -0035H

## (undated) DEVICE — NEPTUNE E-SEP SMOKE EVACUATION PENCIL, COATED, 70MM BLADE, PUSH BUTTON SWITCH: Brand: NEPTUNE E-SEP

## (undated) DEVICE — GARMENT,MEDLINE,DVT,INT,CALF,FOAM,MED: Brand: MEDLINE

## (undated) DEVICE — SUT MONOCRYL 4-0 PS-2 27 IN Y426H

## (undated) DEVICE — ANTIBACTERIAL UNDYED BRAIDED (POLYGLACTIN 910), SYNTHETIC ABSORBABLE SUTURE: Brand: COATED VICRYL

## (undated) DEVICE — DRAPE SHEET X-LG

## (undated) DEVICE — FIBER PROC KIT GOLD TIP 21G 45CM NEVERTOUCH

## (undated) DEVICE — HANDPIECE, SINGLE-USE, SAVI SCOUT®: Brand: SAVI SCOUT®

## (undated) DEVICE — GLOVE INDICATOR PI UNDERGLOVE SZ 6.5 BLUE

## (undated) DEVICE — NEEDLE 25G X 1 1/2

## (undated) DEVICE — GLOVE SRG BIOGEL ECLIPSE 6

## (undated) DEVICE — PROVE COVER: Brand: UNBRANDED

## (undated) DEVICE — 4-PORT MANIFOLD: Brand: NEPTUNE 2

## (undated) DEVICE — SCD SEQUENTIAL COMPRESSION COMFORT SLEEVE MEDIUM KNEE LENGTH: Brand: KENDALL SCD

## (undated) DEVICE — PROBE GAMMA TRUNODE

## (undated) DEVICE — TONGUE DEPRESSOR STERILE

## (undated) DEVICE — KERLIX BANDAGE ROLL: Brand: KERLIX

## (undated) DEVICE — GLOVE INDICATOR PI UNDERGLOVE SZ 7 BLUE

## (undated) DEVICE — 2000CC GUARDIAN II: Brand: GUARDIAN

## (undated) DEVICE — GLOVE SRG BIOGEL 6.5

## (undated) DEVICE — ACE WRAP 4 IN STERILE

## (undated) DEVICE — STERILE MUSCLE FLAP PACK: Brand: CARDINAL HEALTH

## (undated) DEVICE — GLOVE INDICATOR PI UNDERGLOVE SZ 7.5 BLUE

## (undated) DEVICE — GLOVE SRG BIOGEL 7

## (undated) DEVICE — SUT VICRYL 2-0 REEL 54 IN J286G

## (undated) DEVICE — TIBURON SPLIT SHEET: Brand: CONVERTORS

## (undated) DEVICE — GAUZE SPONGES,16 PLY: Brand: CURITY

## (undated) DEVICE — NEEDLE SPINAL 20G X 3.5 LF

## (undated) DEVICE — PREP PAD BNS: Brand: CONVERTORS

## (undated) DEVICE — DRAPE SHEET THREE QUARTER

## (undated) DEVICE — LIGACLIP MCA MULTIPLE CLIP APPLIERS, 20 MEDIUM CLIPS: Brand: LIGACLIP

## (undated) DEVICE — IRR SET 4M PG F/TUMESCENT

## (undated) DEVICE — SYRINGE 10ML LL

## (undated) DEVICE — INTENDED FOR TISSUE SEPARATION, AND OTHER PROCEDURES THAT REQUIRE A SHARP SURGICAL BLADE TO PUNCTURE OR CUT.: Brand: BARD-PARKER SAFETY BLADES SIZE 15, STERILE

## (undated) DEVICE — ELECTRODE BLADE MOD E-Z CLEAN  2.75IN 7CM -0012AM

## (undated) DEVICE — BETHLEHEM UNIVERSAL MINOR GEN: Brand: CARDINAL HEALTH

## (undated) DEVICE — GLOVE SRG BIOGEL 7.5

## (undated) DEVICE — MARKER MARGIN 6 COLOR STANDARD

## (undated) DEVICE — BRA SURGICAL SZ LGE (36-39)

## (undated) DEVICE — DISPOSABLE OR TOWEL: Brand: CARDINAL HEALTH

## (undated) DEVICE — INTENDED FOR TISSUE SEPARATION, AND OTHER PROCEDURES THAT REQUIRE A SHARP SURGICAL BLADE TO PUNCTURE OR CUT.: Brand: BARD-PARKER SAFETY BLADES SIZE 11, STERILE

## (undated) DEVICE — COBAN 4 IN STERILE

## (undated) DEVICE — ULTRASOUND GEL STERILE FOIL PK

## (undated) DEVICE — TELFA NON-ADHERENT ABSORBENT DRESSING: Brand: TELFA

## (undated) DEVICE — STERILE RUBBER BANDS

## (undated) DEVICE — ACE WRAP 6 IN STERILE

## (undated) DEVICE — ADHESIVE SKIN HIGH VISCOSITY EXOFIN 1ML

## (undated) DEVICE — ASTOUND STANDARD SURGICAL GOWN, XXL: Brand: CONVERTORS

## (undated) DEVICE — DRAPE TOWEL: Brand: CONVERTORS